# Patient Record
Sex: MALE | Race: WHITE | Employment: OTHER | URBAN - METROPOLITAN AREA
[De-identification: names, ages, dates, MRNs, and addresses within clinical notes are randomized per-mention and may not be internally consistent; named-entity substitution may affect disease eponyms.]

---

## 2017-01-12 DIAGNOSIS — G40.209 COMPLEX PARTIAL SEIZURES WITH CONSCIOUSNESS IMPAIRED (HCC): ICD-10-CM

## 2017-01-13 RX ORDER — LEVETIRACETAM 750 MG/1
TABLET ORAL
Qty: 360 TAB | Refills: 3 | Status: SHIPPED | OUTPATIENT
Start: 2017-01-13 | End: 2017-04-10 | Stop reason: SDUPTHER

## 2017-04-10 ENCOUNTER — OFFICE VISIT (OUTPATIENT)
Dept: NEUROLOGY | Age: 82
End: 2017-04-10

## 2017-04-10 VITALS
BODY MASS INDEX: 20.31 KG/M2 | DIASTOLIC BLOOD PRESSURE: 66 MMHG | WEIGHT: 134 LBS | OXYGEN SATURATION: 99 % | RESPIRATION RATE: 20 BRPM | HEIGHT: 68 IN | HEART RATE: 60 BPM | SYSTOLIC BLOOD PRESSURE: 140 MMHG

## 2017-04-10 DIAGNOSIS — G30.1 ALZHEIMER'S TYPE DEMENTIA WITH LATE ONSET WITHOUT BEHAVIORAL DISTURBANCE (HCC): ICD-10-CM

## 2017-04-10 DIAGNOSIS — G40.209 COMPLEX PARTIAL SEIZURES WITH CONSCIOUSNESS IMPAIRED (HCC): Primary | ICD-10-CM

## 2017-04-10 DIAGNOSIS — F02.80 ALZHEIMER'S TYPE DEMENTIA WITH LATE ONSET WITHOUT BEHAVIORAL DISTURBANCE (HCC): ICD-10-CM

## 2017-04-10 RX ORDER — MEMANTINE HYDROCHLORIDE 7 MG/1
CAPSULE, EXTENDED RELEASE ORAL
Qty: 21 CAP | Refills: 0 | Status: SHIPPED | OUTPATIENT
Start: 2017-04-10 | End: 2017-06-12 | Stop reason: DRUGHIGH

## 2017-04-10 RX ORDER — LEVETIRACETAM 750 MG/1
TABLET ORAL
Qty: 360 TAB | Refills: 3 | Status: SHIPPED | OUTPATIENT
Start: 2017-04-10 | End: 2017-12-22 | Stop reason: SDUPTHER

## 2017-04-10 RX ORDER — MEMANTINE HYDROCHLORIDE 21 MG/1
21 CAPSULE, EXTENDED RELEASE ORAL DAILY
Qty: 30 CAP | Refills: 5 | Status: SHIPPED | OUTPATIENT
Start: 2017-04-10 | End: 2017-12-22 | Stop reason: SDUPTHER

## 2017-04-10 NOTE — MR AVS SNAPSHOT
Visit Information Date & Time Provider Department Dept. Phone Encounter #  
 4/10/2017 11:00 AM David Escudero NP Neurology [facility] [phone] Follow-up Instructions Return in about 6 months (around 10/10/2017). Upcoming Health Maintenance Date Due DTaP/Tdap/Td series (1 - Tdap) 2/27/1948 ZOSTER VACCINE AGE 60> 2/27/1987 GLAUCOMA SCREENING Q2Y 2/27/1992 MEDICARE YEARLY EXAM 2/27/1992 INFLUENZA AGE 9 TO ADULT 8/1/2016 Pneumococcal 65+ Low/Medium Risk (2 of 2 - PPSV23) 10/2/2016 Allergies as of 4/10/2017  Review Complete On: 4/10/2017 By: David Escudero NP No Known Allergies Current Immunizations  Never Reviewed Name Date Influenza Vaccine Split 10/2/2011 12:29 PM  
 Pneumococcal Vaccine (Unspecified Type) 10/2/2011 12:27 PM  
  
 Not reviewed this visit You Were Diagnosed With   
  
 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  
 140/66 60 20 5' 8\" (1.727 m) 134 lb (60.8 kg) 99% BMI Smoking Status 20.37 kg/m2 Former Smoker Vitals History BMI and BSA Data Body Mass Index Body Surface Area  
 20.37 kg/m 2 1.71 m 2 Preferred Pharmacy Pharmacy Name Phone [id number] [address] [phone] Your Updated Medication List  
  
   
This list is accurate as of: 4/10/17 11:23 AM.  Always use your most recent med list.  
  
  
  
  
 aspirin 81 mg chewable tablet Take 81 mg by mouth nightly. Calcium-Cholecalciferol (D3) 600 mg(1,500mg) -400 unit Chew Take  by mouth. carvedilol 3.125 mg tablet Commonly known as:  Lexi Desai  
 Take 1 Tab by mouth two (2) times daily (with meals). CRESTOR 5 mg tablet Generic drug:  rosuvastatin Take 5 mg by mouth daily. levETIRAcetam 750 mg tablet Commonly known as:  KEPPRA  
take 2 tablets by mouth twice a day * memantine ER 7 mg capsule Commonly known as:  NAMENDA XR Take one po daily for one week then increase to 2 po daily for one week, then increase to 21mg thereafter * memantine ER 21 mg capsule Commonly known as:  NAMENDA XR Take 1 Cap by mouth daily. Take one po daily for one week then increase to 2 po daily for one week, then increase to 21mg thereafter NORVASC 5 mg tablet Generic drug:  amLODIPine Take 5 mg by mouth daily. VITAMIN B-12 2,500 mcg sublingual tablet Generic drug:  cyanocobalamin Take 2,500 mcg by mouth daily. VITAMIN C PO Take  by mouth. * Notice: This list has 2 medication(s) that are the same as other medications prescribed for you. Read the directions carefully, and ask your doctor or other care provider to review them with you. Prescriptions Sent to Pharmacy Refills  
 memantine ER (NAMENDA XR) 7 mg capsule 0 Sig: Take one po daily for one week then increase to 2 po daily for one week, then increase to 21mg thereafter Class: Normal  
 [facility]acy: [id number] [address] [place] [address] Ph #: [phone]  
 memantine ER (NAMENDA XR) 21 mg capsule 5 Sig: Take 1 Cap by mouth daily. Take one po daily for one week then increase to 2 po daily for one week, then increase to 21mg thereafter Class: Normal  
 Pharmacy: [address] [place] [address] Ph #: [phone] Route: Oral  
 levETIRAcetam (KEPPRA) 750 mg tablet 3 Sig: take 2 tablets by mouth twice a day Class: Normal  
 Pharmacy: [address] [place] [address] Ph #: [phone] Follow-up Instructions Return in about 6 months (around 10/10/2017). Patient Instructions A Healthy Lifestyle: Care Instructions Your Care Instructions A healthy lifestyle can help you feel good, stay at a healthy weight, and have plenty of energy for both work and play. A healthy lifestyle is something you can share with your whole family. A healthy lifestyle also can lower your risk for serious health problems, such as high blood pressure, heart disease, and diabetes. You can follow a few steps listed below to improve your health and the health of your family. Follow-up care is a key part of your treatment and safety. Be sure to make and go to all appointments, and call your doctor if you are having problems. Its also a good idea to know your test results and keep a list of the medicines you take. How can you care for yourself at home? · Do not eat too much sugar, fat, or fast foods. You can still have dessert and treats now and then. The goal is moderation. · Start small to improve your eating habits. Pay attention to portion sizes, drink less juice and soda pop, and eat more fruits and vegetables. ¨ Eat a healthy amount of food. A 3-ounce serving of meat, for example, is about the size of a deck of cards. Fill the rest of your plate with vegetables and whole grains. ¨ Limit the amount of soda and sports drinks you have every day. Drink more water when you are thirsty. ¨ Eat at least 5 servings of fruits and vegetables every day. It may seem like a lot, but it is not hard to reach this goal. A serving or helping is 1 piece of fruit, 1 cup of vegetables, or 2 cups of leafy, raw vegetables. Have an apple or some carrot sticks as an afternoon snack instead of a candy bar. Try to have fruits and/or vegetables at every meal. 
· Make exercise part of your daily routine. You may want to start with simple activities, such as walking, bicycling, or slow swimming.  Try to be active 30 to 60 minutes every day. You do not need to do all 30 to 60 minutes all at once. For example, you can exercise 3 times a day for 10 or 20 minutes. Moderate exercise is safe for most people, but it is always a good idea to talk to your doctor before starting an exercise program. 
· Keep moving. Mayo Bran the lawn, work in the garden, or [employer]. Take the stairs instead of the elevator at work. · If you smoke, quit. People who smoke have an increased risk for heart attack, stroke, cancer, and other lung illnesses. Quitting is hard, but there are ways to boost your chance of quitting tobacco for good. ¨ Use nicotine gum, patches, or lozenges. ¨ Ask your doctor about stop-smoking programs and medicines. ¨ Keep trying. In addition to reducing your risk of diseases in the future, you will notice some benefits soon after you stop using tobacco. If you have shortness of breath or asthma symptoms, they will likely get better within a few weeks after you quit. · Limit how much alcohol you drink. Moderate amounts of alcohol (up to 2 drinks a day for men, 1 drink a day for women) are okay. But drinking too much can lead to liver problems, high blood pressure, and other health problems. Family health If you have a family, there are many things you can do together to improve your health. · Eat meals together as a family as often as possible. · Eat healthy foods. This includes fruits, vegetables, lean meats and dairy, and whole grains. · Include your family in your fitness plan. Most people think of activities such as jogging or tennis as the way to fitness, but there are many ways you and your family can be more active. Anything that makes you breathe hard and gets your heart pumping is exercise. Here are some tips: 
¨ Walk to do errands or to take your child to school or the bus. ¨ Go for a family bike ride after dinner instead of watching TV. Where can you learn more? Go to http://svetlana-brooke.info/. Enter W391 in the search box to learn more about \"A Healthy Lifestyle: Care Instructions. \" Current as of: July 26, 2016 Content Version: 11.2 © 4032-8042 [employer], [employer]. Care instructions adapted under license by [employer] (which disclaims liability or warranty for this information). If you have questions about a medical condition or this instruction, always ask your healthcare professional. [address] any warranty or liability for your use of this information. Introducing [facility]! [facility] introduces [employer] patient portal. Now you can access parts of your medical record, email your doctor's office, and request medication refills online. 1. In your internet browser, go to [email or web address] 2. Click on the First Time User? Click Here link in the Sign In box. You will see the New Member Sign Up page. 3. Enter your [employer] Access Code exactly as it appears below. You will not need to use this code after youve completed the sign-up process. If you do not sign up before the expiration date, you must request a new code. · [employer] Access Code: [email or web address] Expires: 7/9/2017 11:23 AM 
 
4. Enter the last four digits of your Social Security Number (xxxx) and Date of Birth (mm/dd/yyyy) as indicated and click Submit. You will be taken to the next sign-up page. 5. Create a [employer] ID. This will be your [employer] login ID and cannot be changed, so think of one that is secure and easy to remember. 6. Create a [employer] password. You can change your password at any time. 7. Enter your Password Reset Question and Answer. This can be used at a later time if you forget your password. 8. Enter your e-mail address. You will receive e-mail notification when new information is available in [address] 9. Click Sign Up. You can now view and download portions of your medical record. 10. Click the Download Summary menu link to download a portable copy of your medical information. If you have questions, please visit the Frequently Asked Questions section of the [employer] website. Remember, [employer] is NOT to be used for urgent needs. For medical emergencies, dial 911. Now available from your iPhone and Android! Please provide this summary of care documentation to your next provider. Your primary care clinician is listed as [address] [place] [address]. If you have any questions after today's visit, please call [phone].

## 2017-04-10 NOTE — PATIENT INSTRUCTIONS

## 2017-04-10 NOTE — PROGRESS NOTES
Date:  04/10/17     Name:  Dean Llanes.  :  1927  MRN:  [id number]     PCP:  Brandy Reyes NP    Chief Complaint   Patient presents with    Seizure     and memory loss      HISTORY OF PRESENT ILLNESS: Follow up epilepsy and late onset dementia. Pt had a mild seizure in January, a staring spell. At this time they decided to stop his Aricept as they believed this was the cause of his seizure. Pt and family have not noticed any difference since stopping the Aricept, state that everything is about the same. He states that he is doing very well and that nothing has changed with his health. He states he tries to stay active, does word searches, plays on the computer and goes outside. Repeating or asking the same thing over and over? No.  Forgetting appointments, family occasions, holidays? No, daughter writes them on the calendar. Needs help managing finances? No.  Needs help shopping? No.  Needs help taking medications correctly? No.  Getting lost in familiar places? No.  Need help with ADLs? No.    Unsafe behaviors:   Aggression- No  Wandering- No  Kitchen- No  Driving (obeying signs, etc)- does not drive    Answers provided by: pt and daughter    Last MMSE:      Current Outpatient Prescriptions   Medication Sig    levETIRAcetam (KEPPRA) 750 mg tablet take 2 tablets by mouth twice a day    cyanocobalamin (VITAMIN B-12) 2,500 mcg sublingual tablet Take 2,500 mcg by mouth daily.  ASCORBATE CALCIUM (VITAMIN C PO) Take  by mouth.  Calcium-Cholecalciferol, D3, 600 mg(1,500mg) -400 unit chew Take  by mouth.  carvedilol (COREG) 3.125 mg tablet Take 1 Tab by mouth two (2) times daily (with meals).  aspirin 81 mg chewable tablet Take 81 mg by mouth nightly.  rosuvastatin (CRESTOR) 5 mg tablet Take 5 mg by mouth daily.  amlodipine (NORVASC) 5 mg tablet Take 5 mg by mouth daily. No current facility-administered medications for this visit. No Known Allergies  Past Medical History:   Diagnosis Date    CAD (coronary artery disease)     Hypercholesteremia     Hypertension     Seizures ([address]     last seizure in 2011 per pt     Past Surgical History:   Procedure Laterality Date    CARDIAC SURG PROCEDURE UNLIST      cardiac stents    HX APPENDECTOMY  1945    HX CHOLECYSTECTOMY  2007    VA EGD BALLOON DILATION ESOPHAGUS <30 MM DIAM  11/21/2012          Social History     Social History    Marital status:      Spouse name: N/A    Number of children: N/A    Years of education: N/A     Occupational History    Not on file. Social History Main Topics    Smoking status: Former Smoker    Smokeless tobacco: Never Used    Alcohol use No    Drug use: No    Sexual activity: Not on file     Other Topics Concern    Not on file     Social History Narrative     Family History   Problem Relation Age of Onset    Alcohol abuse Mother        PHYSICAL EXAMINATION:    Visit Vitals    /66    Pulse 60    Resp 20    Ht 5' 8\" (1.727 m)    Wt 60.8 kg (134 lb)    SpO2 99%    BMI 20.37 kg/m2     General: Well defined, nourished, and groomed individual in no acute distress. Neck: Supple, nontender, no bruits, no pain with resistance to active range of motion. Heart: Regular rate and rhythm, no murmurs, rub, or gallop. Normal S1S2. Lungs: Clear to auscultation bilaterally with equal chest expansion, no cough, no wheeze   Musculoskeletal: Extremities revealed no edema and had full range of motion of joints. Psych: Good mood and bright affect   NEUROLOGICAL EXAMINATION:   Mental Status: Alert and oriented to person, place, and time   Cranial Nerves:   II, III, IV, VI: Visual acuity grossly intact. Visual fields are normal.   Pupils are equal, round, and reactive to light and accommodation. Extra-ocular movements are full and fluid. Fundoscopic exam was benign, no ptosis or nystagmus. V-XII: Hearing is grossly intact.  Facial features are symmetric, with normal sensation and strength. The palate rises symmetrically and the tongue protrudes midline. Sternocleidomastoids 5/5. Motor Examination: Normal tone, bulk, and strength, 5/5 muscle strength throughout. Coordination: No resting or intention tremor   Gait and Station: Steady while walking. No muscle wasting or fasiculations noted. Reflexes: DTRs 2+ throughout.      ASSESSMENT AND PLAN    ICD-10-CM ICD-9-CM    1. Complex partial seizures with consciousness impaired (HCC) G40.209 345.40 levETIRAcetam (KEPPRA) 750 mg tablet   2. 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 Discussed medication alternatives to Aricept including Namenda. Discussed that the goal of memory management therapy is to maintain cognition and delay progression. We will prescribe Namenda titration today. Pt and daughter educated on potential side effects. Pt will continue his Keppra twice daily. He will follow up in six months or sooner if needed. If they have any issues with the Namenda, they were encouraged to call. Pt and daughter informed that they can call with issues or concerns at any time.

## 2017-04-12 ENCOUNTER — TELEPHONE (OUTPATIENT)
Dept: NEUROLOGY | Age: 82
End: 2017-04-12

## 2017-04-12 NOTE — TELEPHONE ENCOUNTER
----- Message from Sraahi La sent at 4/12/2017 10:43 AM EDT -----  Regarding: NP., Renato/Telephone  Pt called concerning the medication which he was given was expensive and they wanted to see if he could take the medication Donetezil.  Pt number [phone]

## 2017-04-13 NOTE — TELEPHONE ENCOUNTER
He can but this is the one they thought might have caused his breakthrough seizure which is why he isn't taking this now. He would start off at 5mg a day, so if they have the 10mg dose they will need to cut that in half for one month then go to the whole pill per NP.     returned call to pt's daughter and she was not aware that it was the same medication. She was asking if you had any other suggestions at this time?

## 2017-04-13 NOTE — TELEPHONE ENCOUNTER
returned call to pt's daughter and she was not aware that it was the same medication. She was asking if you had any other suggestions at this time? Please advise     We can try Razadyne ER 8mg daily. Let her know that this is different but in the same class of medication. Per NP. Left Message - on home number listed letting her know of the previous message sent from NP. She can call us back and let us know if she wants him to start this medication or not.

## 2017-06-07 ENCOUNTER — TELEPHONE (OUTPATIENT)
Dept: NEUROLOGY | Age: 82
End: 2017-06-07

## 2017-06-07 NOTE — TELEPHONE ENCOUNTER
Pt's daughter would like to have call back from you please regarding to pt's new med she does not know the name of med. She said \"the one that NP wants to try on him\".  Thank you

## 2017-06-12 PROBLEM — G30.1 ALZHEIMER'S TYPE DEMENTIA WITH LATE ONSET WITHOUT BEHAVIORAL DISTURBANCE (HCC): Status: ACTIVE | Noted: 2017-06-12

## 2017-06-12 PROBLEM — F02.80 ALZHEIMER'S TYPE DEMENTIA WITH LATE ONSET WITHOUT BEHAVIORAL DISTURBANCE (HCC): Status: ACTIVE | Noted: 2017-06-12

## 2017-09-14 DIAGNOSIS — F02.80 ALZHEIMER'S TYPE DEMENTIA WITH LATE ONSET WITHOUT BEHAVIORAL DISTURBANCE (HCC): ICD-10-CM

## 2017-09-14 DIAGNOSIS — G30.1 ALZHEIMER'S TYPE DEMENTIA WITH LATE ONSET WITHOUT BEHAVIORAL DISTURBANCE (HCC): ICD-10-CM

## 2017-09-16 DIAGNOSIS — G30.1 ALZHEIMER'S TYPE DEMENTIA WITH LATE ONSET WITHOUT BEHAVIORAL DISTURBANCE (HCC): ICD-10-CM

## 2017-09-16 DIAGNOSIS — F02.80 ALZHEIMER'S TYPE DEMENTIA WITH LATE ONSET WITHOUT BEHAVIORAL DISTURBANCE (HCC): ICD-10-CM

## 2017-09-18 RX ORDER — GALANTAMINE HYDROBROMIDE 8 MG/1
CAPSULE, EXTENDED RELEASE ORAL
Qty: 30 CAP | Refills: 2 | Status: SHIPPED | OUTPATIENT
Start: 2017-09-18 | End: 2017-12-14 | Stop reason: SDUPTHER

## 2017-09-18 RX ORDER — GALANTAMINE HYDROBROMIDE 8 MG/1
CAPSULE, EXTENDED RELEASE ORAL
Qty: 30 CAP | Refills: 2 | Status: SHIPPED | OUTPATIENT
Start: 2017-09-18 | End: 2017-12-22 | Stop reason: SDUPTHER

## 2017-12-14 DIAGNOSIS — G30.1 ALZHEIMER'S TYPE DEMENTIA WITH LATE ONSET WITHOUT BEHAVIORAL DISTURBANCE (HCC): ICD-10-CM

## 2017-12-14 DIAGNOSIS — F02.80 ALZHEIMER'S TYPE DEMENTIA WITH LATE ONSET WITHOUT BEHAVIORAL DISTURBANCE (HCC): ICD-10-CM

## 2017-12-14 RX ORDER — GALANTAMINE HYDROBROMIDE 8 MG/1
CAPSULE, EXTENDED RELEASE ORAL
Qty: 30 CAP | Refills: 2 | Status: SHIPPED | OUTPATIENT
Start: 2017-12-14 | End: 2017-12-22

## 2017-12-22 ENCOUNTER — OFFICE VISIT (OUTPATIENT)
Dept: NEUROLOGY | Age: 82
End: 2017-12-22

## 2017-12-22 VITALS
HEIGHT: 68 IN | BODY MASS INDEX: 19.1 KG/M2 | SYSTOLIC BLOOD PRESSURE: 130 MMHG | WEIGHT: 126 LBS | RESPIRATION RATE: 20 BRPM | DIASTOLIC BLOOD PRESSURE: 64 MMHG

## 2017-12-22 DIAGNOSIS — G30.1 ALZHEIMER'S TYPE DEMENTIA WITH LATE ONSET WITHOUT BEHAVIORAL DISTURBANCE (HCC): ICD-10-CM

## 2017-12-22 DIAGNOSIS — F02.80 ALZHEIMER'S TYPE DEMENTIA WITH LATE ONSET WITHOUT BEHAVIORAL DISTURBANCE (HCC): ICD-10-CM

## 2017-12-22 DIAGNOSIS — G40.209 COMPLEX PARTIAL SEIZURES WITH CONSCIOUSNESS IMPAIRED (HCC): ICD-10-CM

## 2017-12-22 RX ORDER — MEMANTINE HYDROCHLORIDE 21 MG/1
21 CAPSULE, EXTENDED RELEASE ORAL DAILY
Qty: 30 CAP | Refills: 5 | Status: SHIPPED | OUTPATIENT
Start: 2017-12-22 | End: 2018-06-18 | Stop reason: SDUPTHER

## 2017-12-22 RX ORDER — LEVETIRACETAM 750 MG/1
TABLET ORAL
Qty: 360 TAB | Refills: 5 | Status: SHIPPED | OUTPATIENT
Start: 2017-12-22 | End: 2018-06-18 | Stop reason: SDUPTHER

## 2017-12-22 RX ORDER — GALANTAMINE HYDROBROMIDE 8 MG/1
CAPSULE, EXTENDED RELEASE ORAL
Qty: 30 CAP | Refills: 5 | Status: SHIPPED | OUTPATIENT
Start: 2017-12-22 | End: 2018-06-18 | Stop reason: SDUPTHER

## 2017-12-22 RX ORDER — OXYCODONE HYDROCHLORIDE 5 MG/1
TABLET ORAL
Refills: 0 | COMMUNITY
Start: 2017-12-11 | End: 2018-09-29 | Stop reason: CLARIF

## 2017-12-22 NOTE — PATIENT INSTRUCTIONS
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
## 2017-12-22 NOTE — MR AVS SNAPSHOT
Visit Information Date & Time Provider Department Dept. Phone Encounter #  
 12/22/2017 11:30 AM JUAN LUIS Perezhe [facility]legodel[facility] [phone] [id number] Follow-up Instructions Return in about 6 months (around 6/22/2018). Upcoming Health Maintenance Date Due DTaP/Tdap/Td series (1 - Tdap) 2/27/1948 ZOSTER VACCINE AGE 60> 12/27/1986 GLAUCOMA SCREENING Q2Y 2/27/1992 MEDICARE YEARLY EXAM 2/27/1992 Pneumococcal 65+ Low/Medium Risk (2 of 2 - PPSV23) 10/2/2016 Influenza Age 5 to Adult 8/1/2017 Allergies as of 12/22/2017  Review Complete On: 12/22/2017 By: Santos Alvarado NP No Known Allergies Current Immunizations  Never Reviewed Name Date Influenza Vaccine Split 10/2/2011 12:29 PM  
 ZZZ-RETIRED (DO NOT USE) Pneumococcal Vaccine (Unspecified Type) 10/2/2011 12:27 PM  
  
 Not reviewed this visit You Were Diagnosed With   
  
 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  
 19.16 kg/m 2 1.66 m 2 Preferred Pharmacy Pharmacy Name Phone [id number] [address] [place] [address] [phone] Your Updated Medication List  
  
   
This list is accurate as of: 12/22/17 12:25 PM.  Always use your most recent med list.  
  
  
  
  
 aspirin 81 mg chewable tablet Take 81 mg by mouth nightly. Calcium-Cholecalciferol (D3) 600 mg(1,500mg) -400 unit Chew Take  by mouth. carvedilol 3.125 mg tablet Commonly known as:  Svetlana Carnes  
 Take 1 Tab by mouth two (2) times daily (with meals). CRESTOR 5 mg tablet Generic drug:  rosuvastatin Take 5 mg by mouth daily. galantamine 8 mg SR capsule Commonly known as:  RAZADYNE  
take 1 capsule by mouth daily before BREAKFAST  
  
 levETIRAcetam 750 mg tablet Commonly known as:  KEPPRA  
take 2 tablets by mouth twice a day  
  
 memantine ER 21 mg capsule Commonly known as:  NAMENDA XR Take 1 Cap by mouth daily. NORVASC 5 mg tablet Generic drug:  amLODIPine Take 5 mg by mouth daily. oxyCODONE IR 5 mg immediate release tablet Commonly known as:  ROXICODONE  
TAKE ONE-HALF TO 1 TABLET EVERY 6 HOURS FOR 5 DAYS AS NEEDED FOR PAIN. VITAMIN B-12 2,500 mcg sublingual tablet Generic drug:  cyanocobalamin Take 2,500 mcg by mouth daily. VITAMIN C PO Take  by mouth. Prescriptions Sent to Pharmacy Refills  
 galantamine (RAZADYNE) 8 mg SR capsule 5 Sig: take 1 capsule by mouth daily before BREAKFAST Class: Normal  
 Pharmacy: [address] Ph #: [phone]  
 memantine ER (NAMENDA XR) 21 mg capsule 5 Sig: Take 1 Cap by mouth daily. Class: Normal  
 Pharmacy: [address] [place] [address] Ph #: [phone] Route: Oral  
 levETIRAcetam (KEPPRA) 750 mg tablet 5 Sig: take 2 tablets by mouth twice a day Class: Normal  
 Pharmacy: [address] [place] [address] Ph #: [phone] Follow-up Instructions Return in about 6 months (around 6/22/2018). Patient Instructions A Healthy Lifestyle: Care Instructions Your Care Instructions A healthy lifestyle can help you feel good, stay at a healthy weight, and have plenty of energy for both work and play. A healthy lifestyle is something you can share with your whole family. A healthy lifestyle also can lower your risk for serious health problems, such as high blood pressure, heart disease, and diabetes. You can follow a few steps listed below to improve your health and the health of your family. Follow-up care is a key part of your treatment and safety. Be sure to make and go to all appointments, and call your doctor if you are having problems. It's also a good idea to know your test results and keep a list of the medicines you take. How can you care for yourself at home? · Do not eat too much sugar, fat, or fast foods. You can still have dessert and treats now and then. The goal is moderation. · Start small to improve your eating habits. Pay attention to portion sizes, drink less juice and soda pop, and eat more fruits and vegetables. ¨ Eat a healthy amount of food. A 3-ounce serving of meat, for example, is about the size of a deck of cards. Fill the rest of your plate with vegetables and whole grains. ¨ Limit the amount of soda and sports drinks you have every day. Drink more water when you are thirsty. ¨ Eat at least 5 servings of fruits and vegetables every day. It may seem like a lot, but it is not hard to reach this goal. A serving or helping is 1 piece of fruit, 1 cup of vegetables, or 2 cups of leafy, raw vegetables. Have an apple or some carrot sticks as an afternoon snack instead of a candy bar. Try to have fruits and/or vegetables at every meal. 
· Make exercise part of your daily routine. You may want to start with simple activities, such as walking, bicycling, or slow swimming. Try to be active 30 to 60 minutes every day. You do not need to do all 30 to 60 minutes all at once. For example, you can exercise 3 times a day for 10 or 20 minutes. Moderate exercise is safe for most people, but it is always a good idea to talk to your doctor before starting an exercise program. 
· Keep moving. Roanne Pages the lawn, work in the garden, or [employer].  Take the stairs instead of the elevator at work. · If you smoke, quit. People who smoke have an increased risk for heart attack, stroke, cancer, and other lung illnesses. Quitting is hard, but there are ways to boost your chance of quitting tobacco for good. ¨ Use nicotine gum, patches, or lozenges. ¨ Ask your doctor about stop-smoking programs and medicines. ¨ Keep trying. In addition to reducing your risk of diseases in the future, you will notice some benefits soon after you stop using tobacco. If you have shortness of breath or asthma symptoms, they will likely get better within a few weeks after you quit. · Limit how much alcohol you drink. Moderate amounts of alcohol (up to 2 drinks a day for men, 1 drink a day for women) are okay. But drinking too much can lead to liver problems, high blood pressure, and other health problems. Family health If you have a family, there are many things you can do together to improve your health. · Eat meals together as a family as often as possible. · Eat healthy foods. This includes fruits, vegetables, lean meats and dairy, and whole grains. · Include your family in your fitness plan. Most people think of activities such as jogging or tennis as the way to fitness, but there are many ways you and your family can be more active. Anything that makes you breathe hard and gets your heart pumping is exercise. Here are some tips: 
¨ Walk to do errands or to take your child to school or the bus. ¨ Go for a family bike ride after dinner instead of watching TV. Where can you learn more? Go to http://svetlana-brooke.info/. Enter Y685 in the search box to learn more about \"A Healthy Lifestyle: Care Instructions. \" Current as of: May 12, 2017 Content Version: 11.4 © 5118-3425 Healthwise, [employer].  Care instructions adapted under license by [employer] (which disclaims liability or warranty for this information). If you have questions about a medical condition or this instruction, always ask your healthcare professional. [address] any warranty or liability for your use of this information. Introducing [facility]! [facility] introduces Medbox patient portal. Now you can access parts of your medical record, email your doctor's office, and request medication refills online. 1. In your internet browser, go to [email or web address] 2. Click on the First Time User? Click Here link in the Sign In box. You will see the New Member Sign Up page. 3. Enter your Medbox Access Code exactly as it appears below. You will not need to use this code after youve completed the sign-up process. If you do not sign up before the expiration date, you must request a new code. · Medbox Access Code: [email or web address] Expires: 3/22/2018 12:25 PM 
 
4. Enter the last four digits of your Social Security Number (xxxx) and Date of Birth (mm/dd/yyyy) as indicated and click Submit. You will be taken to the next sign-up page. 5. Create a Medbox ID. This will be your Medbox login ID and cannot be changed, so think of one that is secure and easy to remember. 6. Create a Medbox password. You can change your password at any time. 7. Enter your Password Reset Question and Answer. This can be used at a later time if you forget your password. 8. Enter your e-mail address. You will receive e-mail notification when new information is available in [address] 9. Click Sign Up. You can now view and download portions of your medical record. 10. Click the Download Summary menu link to download a portable copy of your medical information. If you have questions, please visit the Frequently Asked Questions section of the Medbox website. Remember, Medbox is NOT to be used for urgent needs. For medical emergencies, dial 911. Now available from your iPhone and Android! Please provide this summary of care documentation to your next provider. Your primary care clinician is listed as Girish Rodriguez. If you have any questions after today's visit, please call [phone].
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
## 2018-01-01 ENCOUNTER — OFFICE VISIT (OUTPATIENT)
Dept: NEUROLOGY | Age: 83
End: 2018-01-01

## 2018-01-01 VITALS
WEIGHT: 121 LBS | RESPIRATION RATE: 16 BRPM | SYSTOLIC BLOOD PRESSURE: 130 MMHG | OXYGEN SATURATION: 97 % | DIASTOLIC BLOOD PRESSURE: 60 MMHG | HEART RATE: 62 BPM | HEIGHT: 68 IN | BODY MASS INDEX: 18.34 KG/M2

## 2018-01-01 DIAGNOSIS — G30.1 ALZHEIMER'S TYPE DEMENTIA WITH LATE ONSET WITHOUT BEHAVIORAL DISTURBANCE (HCC): ICD-10-CM

## 2018-01-01 DIAGNOSIS — F02.80 ALZHEIMER'S TYPE DEMENTIA WITH LATE ONSET WITHOUT BEHAVIORAL DISTURBANCE (HCC): ICD-10-CM

## 2018-01-01 DIAGNOSIS — G40.209 COMPLEX PARTIAL SEIZURES WITH CONSCIOUSNESS IMPAIRED (HCC): ICD-10-CM

## 2018-01-01 RX ORDER — LEVETIRACETAM 750 MG/1
TABLET ORAL
Qty: 360 TAB | Refills: 5 | Status: SHIPPED | OUTPATIENT
Start: 2018-01-01 | End: 2019-01-01 | Stop reason: SDUPTHER

## 2018-01-01 RX ORDER — GALANTAMINE HYDROBROMIDE 8 MG/1
CAPSULE, EXTENDED RELEASE ORAL
Qty: 30 CAP | Refills: 5 | Status: SHIPPED | OUTPATIENT
Start: 2018-01-01 | End: 2018-01-01 | Stop reason: SDUPTHER

## 2018-01-01 RX ORDER — GALANTAMINE HYDROBROMIDE 8 MG/1
CAPSULE, EXTENDED RELEASE ORAL
Qty: 90 CAP | Refills: 1 | Status: SHIPPED | OUTPATIENT
Start: 2018-01-01 | End: 2019-01-01 | Stop reason: SDUPTHER

## 2018-06-18 ENCOUNTER — OFFICE VISIT (OUTPATIENT)
Dept: NEUROLOGY | Age: 83
End: 2018-06-18

## 2018-06-18 VITALS
HEART RATE: 56 BPM | RESPIRATION RATE: 20 BRPM | HEIGHT: 68 IN | DIASTOLIC BLOOD PRESSURE: 62 MMHG | OXYGEN SATURATION: 96 % | WEIGHT: 128 LBS | SYSTOLIC BLOOD PRESSURE: 132 MMHG | BODY MASS INDEX: 19.4 KG/M2

## 2018-06-18 DIAGNOSIS — F02.80 ALZHEIMER'S TYPE DEMENTIA WITH LATE ONSET WITHOUT BEHAVIORAL DISTURBANCE (HCC): ICD-10-CM

## 2018-06-18 DIAGNOSIS — G40.209 COMPLEX PARTIAL SEIZURES WITH CONSCIOUSNESS IMPAIRED (HCC): ICD-10-CM

## 2018-06-18 DIAGNOSIS — G30.1 ALZHEIMER'S TYPE DEMENTIA WITH LATE ONSET WITHOUT BEHAVIORAL DISTURBANCE (HCC): ICD-10-CM

## 2018-06-18 RX ORDER — GALANTAMINE HYDROBROMIDE 8 MG/1
CAPSULE, EXTENDED RELEASE ORAL
Qty: 30 CAP | Refills: 5 | Status: SHIPPED | OUTPATIENT
Start: 2018-06-18 | End: 2018-08-15 | Stop reason: SDUPTHER

## 2018-06-18 RX ORDER — MEMANTINE HYDROCHLORIDE 21 MG/1
21 CAPSULE, EXTENDED RELEASE ORAL DAILY
Qty: 30 CAP | Refills: 5 | Status: SHIPPED | OUTPATIENT
Start: 2018-06-18 | End: 2018-06-25 | Stop reason: SINTOL

## 2018-06-18 RX ORDER — LEVETIRACETAM 750 MG/1
TABLET ORAL
Qty: 360 TAB | Refills: 5 | Status: SHIPPED | OUTPATIENT
Start: 2018-06-18 | End: 2018-01-01 | Stop reason: SDUPTHER

## 2018-06-18 NOTE — PATIENT INSTRUCTIONS

## 2018-06-18 NOTE — PROGRESS NOTES
No seizures since he was last here   No changes with his memory since he was here last per his daughter

## 2018-06-18 NOTE — MR AVS SNAPSHOT
[facility]  [facility] [address] [facility] [place] [id number] [phone] Patient: Ronny Shirley Sr. MRN: [id number] Visit Information Date & Time Provider Department Dept. Phone Encounter #  
 2018 11:30 AM Froylan Fox NP [facility] [phone] [id number] Upcoming Health Maintenance Date Due DTaP/Tdap/Td series (1 - Tdap) 1948 ZOSTER VACCINE AGE 60> 1986 GLAUCOMA SCREENING Q2Y 1992 Pneumococcal 65+ Low/Medium Risk (2 of 2 - PPSV23) 10/2/2016 Influenza Age 5 to Adult 2018 Allergies as of 2018  Review Complete On: 2018 By: Froylan Fox NP No Known Allergies Current Immunizations  Never Reviewed Name Date Influenza Vaccine Split 10/2/2011 12:29 PM  
 ZZZ-RETIRED (DO NOT USE) Pneumococcal Vaccine (Unspecified Type) 10/2/2011 12:27 PM  
  
 Not reviewed this visit You Were Diagnosed With   
  
 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  
 132/62 (!) 56 20 5' 8\" (1.727 m) 128 lb (58.1 kg) 96% BMI Smoking Status 19.46 kg/m2 Former Smoker Vitals History BMI and BSA Data Body Mass Index Body Surface Area  
 19.46 kg/m 2 1.67 m 2 Preferred Pharmacy Pharmacy Name Phone [id number] [address] [place] [address], [address] [place] [address] [phone] Your Updated Medication List  
  
   
This list is accurate as of 18 12:32 PM.  Always use your most recent med list.  
  
  
  
  
 aspirin 81 mg chewable tablet Take 81 mg by mouth nightly. Calcium-Cholecalciferol (D3) 600 mg(1,500mg) -400 unit Chew Take  by mouth. carvedilol 3.125 mg tablet Commonly known as:  Lyssa Finder Take 1 Tab by mouth two (2) times daily (with meals). CRESTOR 5 mg tablet Generic drug:  rosuvastatin Take 5 mg by mouth daily. galantamine 8 mg SR capsule Commonly known as:  RAZADYNE  
take 1 capsule by mouth daily before BREAKFAST  
  
 levETIRAcetam 750 mg tablet Commonly known as:  KEPPRA  
take 2 tablets by mouth twice a day  
  
 memantine ER 21 mg capsule Commonly known as:  NAMENDA XR Take 1 Cap by mouth daily. NORVASC 5 mg tablet Generic drug:  amLODIPine Take 5 mg by mouth daily. oxyCODONE IR 5 mg immediate release tablet Commonly known as:  ROXICODONE  
TAKE ONE-HALF TO 1 TABLET EVERY 6 HOURS FOR 5 DAYS AS NEEDED FOR PAIN. VITAMIN B-12 2,500 mcg sublingual tablet Generic drug:  cyanocobalamin Take 2,500 mcg by mouth daily. VITAMIN C PO Take  by mouth. Prescriptions Sent to Pharmacy Refills  
 galantamine (RAZADYNE) 8 mg SR capsule 5 Sig: take 1 capsule by mouth daily before BREAKFAST Class: Normal  
 Pharmacy: [address] Ph #: [phone]  
 memantine ER (NAMENDA XR) 21 mg capsule 5 Sig: Take 1 Cap by mouth daily. Class: Normal  
 Pharmacy: [address] [place] [address] Ph #: [phone] Route: Oral  
 levETIRAcetam (KEPPRA) 750 mg tablet 5 Sig: take 2 tablets by mouth twice a day Class: Normal  
 Pharmacy: [address] [place] [address] Ph #: [phone] Patient Instructions A Healthy Lifestyle: Care Instructions Your Care Instructions A healthy lifestyle can help you feel good, stay at a healthy weight, and have plenty of energy for both work and play.  A healthy lifestyle is something you can share with your whole family. A healthy lifestyle also can lower your risk for serious health problems, such as high blood pressure, heart disease, and diabetes. You can follow a few steps listed below to improve your health and the health of your family. Follow-up care is a key part of your treatment and safety. Be sure to make and go to all appointments, and call your doctor if you are having problems. It's also a good idea to know your test results and keep a list of the medicines you take. How can you care for yourself at home? · Do not eat too much sugar, fat, or fast foods. You can still have dessert and treats now and then. The goal is moderation. · Start small to improve your eating habits. Pay attention to portion sizes, drink less juice and soda pop, and eat more fruits and vegetables. ¨ Eat a healthy amount of food. A 3-ounce serving of meat, for example, is about the size of a deck of cards. Fill the rest of your plate with vegetables and whole grains. ¨ Limit the amount of soda and sports drinks you have every day. Drink more water when you are thirsty. ¨ Eat at least 5 servings of fruits and vegetables every day. It may seem like a lot, but it is not hard to reach this goal. A serving or helping is 1 piece of fruit, 1 cup of vegetables, or 2 cups of leafy, raw vegetables. Have an apple or some carrot sticks as an afternoon snack instead of a candy bar. Try to have fruits and/or vegetables at every meal. 
· Make exercise part of your daily routine. You may want to start with simple activities, such as walking, bicycling, or slow swimming. Try to be active 30 to 60 minutes every day. You do not need to do all 30 to 60 minutes all at once. For example, you can exercise 3 times a day for 10 or 20 minutes.  Moderate exercise is safe for most people, but it is always a good idea to talk to your doctor before starting an exercise program. 
 · Keep moving. Antonino Aguirre the lawn, work in the garden, or ScanÃ¢â‚¬Â¢Jour. Take the stairs instead of the elevator at work. · If you smoke, quit. People who smoke have an increased risk for heart attack, stroke, cancer, and other lung illnesses. Quitting is hard, but there are ways to boost your chance of quitting tobacco for good. ¨ Use nicotine gum, patches, or lozenges. ¨ Ask your doctor about stop-smoking programs and medicines. ¨ Keep trying. In addition to reducing your risk of diseases in the future, you will notice some benefits soon after you stop using tobacco. If you have shortness of breath or asthma symptoms, they will likely get better within a few weeks after you quit. · Limit how much alcohol you drink. Moderate amounts of alcohol (up to 2 drinks a day for men, 1 drink a day for women) are okay. But drinking too much can lead to liver problems, high blood pressure, and other health problems. Family health If you have a family, there are many things you can do together to improve your health. · Eat meals together as a family as often as possible. · Eat healthy foods. This includes fruits, vegetables, lean meats and dairy, and whole grains. · Include your family in your fitness plan. Most people think of activities such as jogging or tennis as the way to fitness, but there are many ways you and your family can be more active. Anything that makes you breathe hard and gets your heart pumping is exercise. Here are some tips: 
¨ Walk to do errands or to take your child to school or the bus. ¨ Go for a family bike ride after dinner instead of watching TV. Where can you learn more? Go to http://svetlana-brooke.info/. Enter X340 in the search box to learn more about \"A Healthy Lifestyle: Care Instructions. \" Current as of: May 12, 2017 Content Version: 11.4 © 0802-0753 Healthwise[employer] Incorporated.  Care instructions adapted under license by [address] (which disclaims liability or warranty for this information). If you have questions about a medical condition or this instruction, always ask your healthcare professional. [address] any warranty or liability for your use of this information. Introducing [facility]! [facility] introduces [employer] patient portal. Now you can access parts of your medical record, email your doctor's office, and request medication refills online. 1. In your internet browser, go to [email or web address] 2. Click on the First Time User? Click Here link in the Sign In box. You will see the New Member Sign Up page. 3. Enter your [employer] Access Code exactly as it appears below. You will not need to use this code after youve completed the sign-up process. If you do not sign up before the expiration date, you must request a new code. · [employer] Access Code: [email or web address] Expires: 9/16/2018 12:32 PM 
 
4. Enter the last four digits of your Social Security Number (xxxx) and Date of Birth (mm/dd/yyyy) as indicated and click Submit. You will be taken to the next sign-up page. 5. Create a [employer] ID. This will be your [employer] login ID and cannot be changed, so think of one that is secure and easy to remember. 6. Create a [employer] password. You can change your password at any time. 7. Enter your Password Reset Question and Answer. This can be used at a later time if you forget your password. 8. Enter your e-mail address. You will receive e-mail notification when new information is available in [address] 9. Click Sign Up. You can now view and download portions of your medical record. 10. Click the Download Summary menu link to download a portable copy of your medical information. If you have questions, please visit the Frequently Asked Questions section of the [employer] website.  Remember, [employer] is NOT to be used for urgent needs. For medical emergencies, dial 911. Now available from your iPhone and Android! Please provide this summary of care documentation to your next provider. Your primary care clinician is listed as Chris Renee. If you have any questions after today's visit, please call [phone].

## 2018-06-18 NOTE — PROGRESS NOTES
Date:  18     Name:  Juma Colmenares.  :  1927  MRN:  [id number]     PCP:  Dariusz Merrill NP    Chief Complaint   Patient presents with    Epilepsy     HISTORY OF PRESENT ILLNESS: Follow up for evaluation of epilepsy and dementia. He continues with Razadyne ER for memory management without difficulty. His daughter indicates that he continues to be active physically and mentally. He is independent with all ADLs. He does not drive. No dangerous behaviors, confusion, or wandering. He is not getting lost in familiar places. He continues with Keppra for seizure management without sign or symptom of toxicity or side effect. No seizures. No new issues or problems. Except as noted above, denies  fever, chills, cough. No CP or SOB. No dysuria, loss of bowel or bladder control. No Weight loss. Appetite good. Sleeping well. No sweats. No edema. No bruising or bleeding. No nausea or vomit. No diarrhea. No frequency, urgency, No depressive sxs. No anxiety. Denies sore throat, nasal congestion, nasal discharge, epistaxis, tinnitus, hearing loss, back pain, muscle pain, or joint pain. Current Outpatient Prescriptions   Medication Sig    oxyCODONE IR (ROXICODONE) 5 mg immediate release tablet TAKE ONE-HALF TO 1 TABLET EVERY 6 HOURS FOR 5 DAYS AS NEEDED FOR PAIN.  galantamine (RAZADYNE) 8 mg SR capsule take 1 capsule by mouth daily before BREAKFAST    memantine ER (NAMENDA XR) 21 mg capsule Take 1 Cap by mouth daily.  levETIRAcetam (KEPPRA) 750 mg tablet take 2 tablets by mouth twice a day    cyanocobalamin (VITAMIN B-12) 2,500 mcg sublingual tablet Take 2,500 mcg by mouth daily.  ASCORBATE CALCIUM (VITAMIN C PO) Take  by mouth.  Calcium-Cholecalciferol, D3, 600 mg(1,500mg) -400 unit chew Take  by mouth.  carvedilol (COREG) 3.125 mg tablet Take 1 Tab by mouth two (2) times daily (with meals).  aspirin 81 mg chewable tablet Take 81 mg by mouth nightly.     rosuvastatin (CRESTOR) 5 mg tablet Take 5 mg by mouth daily.  amlodipine (NORVASC) 5 mg tablet Take 5 mg by mouth daily. No current facility-administered medications for this visit. No Known Allergies  Past Medical History:   Diagnosis Date    CAD (coronary artery disease)     Hypercholesteremia     Hypertension     Seizures ([address]     last seizure in 2011 per pt     Past Surgical History:   Procedure Laterality Date    CARDIAC SURG PROCEDURE UNLIST      cardiac stents    HX APPENDECTOMY  1945    HX CHOLECYSTECTOMY  2007    NH EGD BALLOON DILATION ESOPHAGUS <30 MM DIAM  11/21/2012          Social History     Social History    Marital status:      Spouse name: N/A    Number of children: N/A    Years of education: N/A     Occupational History    Not on file. Social History Main Topics    Smoking status: Former Smoker    Smokeless tobacco: Never Used    Alcohol use No    Drug use: No    Sexual activity: Not on file     Other Topics Concern    Not on file     Social History Narrative     Family History   Problem Relation Age of Onset    Alcohol abuse Mother        PHYSICAL EXAMINATION:    Visit Vitals    /62    Pulse (!) 56    Resp 20    Ht 5' 8\" (1.727 m)    Wt 58.1 kg (128 lb)    SpO2 96%    BMI 19.46 kg/m2     General: Well defined, nourished, and groomed individual in no acute distress. Neck: Supple, nontender, no bruits, no pain with resistance to active range of motion. Heart: Regular rate and rhythm, no murmurs, rub, or gallop. Normal S1S2. Lungs: Clear to auscultation bilaterally with equal chest expansion, no cough, no wheeze   Musculoskeletal: Extremities revealed no edema and had full range of motion of joints. Psych: Good mood and bright affect   NEUROLOGICAL EXAMINATION:   Mental Status: Alert and oriented to person, place, and time   Cranial Nerves:   II, III, IV, VI: Visual acuity grossly intact.  Visual fields are normal.   Pupils are equal, round, and reactive to light and accommodation. Extra-ocular movements are full and fluid. Fundoscopic exam was benign, no ptosis or nystagmus. V-XII: Hearing is grossly intact. Facial features are symmetric, with normal sensation and strength. The palate rises symmetrically and the tongue protrudes midline. Sternocleidomastoids 5/5. Motor Examination: Normal tone, bulk, and strength, 5/5 muscle strength throughout. Coordination: No resting or intention tremor   Gait and Station: Steady while walking. No muscle wasting or fasiculations noted. Reflexes: DTRs 2+ throughout. ASSESSMENT AND PLAN    ICD-10-CM ICD-9-CM    1. 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 Complex partial seizures with consciousness impaired (HCC) G40.209 345.40 levETIRAcetam (KEPPRA) 750 mg tablet     Late onset AD without behavioral disturbance continues to be stable on present memory management regimen. He should continue to be as active both physically and mentally as possible. Epilepsy is stable since being on Keppra without sign of toxicity or side effect. No changes to his current plan of care. Follow up in six months or sooner if needed. Nevaeh Goff Gene

## 2018-06-22 ENCOUNTER — TELEPHONE (OUTPATIENT)
Dept: NEUROLOGY | Age: 83
End: 2018-06-22

## 2018-06-22 NOTE — TELEPHONE ENCOUNTER
----- Message from Melo Guaman sent at 6/22/2018  1:38 PM EDT -----  Regarding: CHANELLE Gross/ Telephone  Contact: [phone]  Ms. Honey Arias, patient daughter, requesting to speak with CHANELLE Jimenez Brightnnamdi in regards to a prescription.

## 2018-06-25 NOTE — TELEPHONE ENCOUNTER
Pt's daughter called and wanted to let us know that he is not taking the Namenda. She stated she was not sure why it was sent in the other day. This medication gave him a seizure when he tried taking it last time. She was just letting us knwo that he was not on this anymore. HE was taking the galantamine now .

## 2018-08-15 DIAGNOSIS — F02.80 ALZHEIMER'S TYPE DEMENTIA WITH LATE ONSET WITHOUT BEHAVIORAL DISTURBANCE (HCC): ICD-10-CM

## 2018-08-15 DIAGNOSIS — G30.1 ALZHEIMER'S TYPE DEMENTIA WITH LATE ONSET WITHOUT BEHAVIORAL DISTURBANCE (HCC): ICD-10-CM

## 2018-08-16 RX ORDER — GALANTAMINE HYDROBROMIDE 8 MG/1
CAPSULE, EXTENDED RELEASE ORAL
Qty: 90 CAP | Refills: 1 | Status: SHIPPED | OUTPATIENT
Start: 2018-08-16 | End: 2018-01-01 | Stop reason: SDUPTHER

## 2018-09-29 ENCOUNTER — HOSPITAL ENCOUNTER (INPATIENT)
Age: 83
LOS: 2 days | Discharge: HOME | DRG: 101 | End: 2018-10-01
Attending: EMERGENCY MEDICINE | Admitting: INTERNAL MEDICINE
Payer: MEDICARE

## 2018-09-29 ENCOUNTER — APPOINTMENT (OUTPATIENT)
Dept: CT IMAGING | Age: 83
DRG: 101 | End: 2018-09-29
Attending: EMERGENCY MEDICINE
Payer: MEDICARE

## 2018-09-29 DIAGNOSIS — N30.00 ACUTE CYSTITIS WITHOUT HEMATURIA: Primary | ICD-10-CM

## 2018-09-29 DIAGNOSIS — F02.80 ALZHEIMER'S TYPE DEMENTIA WITH LATE ONSET WITHOUT BEHAVIORAL DISTURBANCE (HCC): ICD-10-CM

## 2018-09-29 DIAGNOSIS — G30.1 ALZHEIMER'S TYPE DEMENTIA WITH LATE ONSET WITHOUT BEHAVIORAL DISTURBANCE (HCC): ICD-10-CM

## 2018-09-29 DIAGNOSIS — R56.9 SEIZURE (HCC): ICD-10-CM

## 2018-09-29 DIAGNOSIS — G93.40 ACUTE ENCEPHALOPATHY: ICD-10-CM

## 2018-09-29 PROBLEM — I48.91 ATRIAL FIBRILLATION WITH RVR (HCC): Status: ACTIVE | Noted: 2018-09-29

## 2018-09-29 LAB
ALBUMIN SERPL-MCNC: 4 G/DL (ref 3.5–5)
ALBUMIN/GLOB SERPL: 1 {RATIO} (ref 1.1–2.2)
ALP SERPL-CCNC: 98 U/L (ref 45–117)
ALT SERPL-CCNC: 20 U/L (ref 12–78)
ANION GAP SERPL CALC-SCNC: 5 MMOL/L (ref 5–15)
APPEARANCE UR: CLEAR
AST SERPL-CCNC: 14 U/L (ref 15–37)
ATRIAL RATE: 85 BPM
BACTERIA URNS QL MICRO: ABNORMAL /HPF
BASOPHILS # BLD: 0 K/UL (ref 0–0.1)
BASOPHILS NFR BLD: 0 % (ref 0–1)
BILIRUB SERPL-MCNC: 0.4 MG/DL (ref 0.2–1)
BILIRUB UR QL: NEGATIVE
BUN SERPL-MCNC: 17 MG/DL (ref 6–20)
BUN/CREAT SERPL: 16 (ref 12–20)
CALCIUM SERPL-MCNC: 9.3 MG/DL (ref 8.5–10.1)
CALCULATED P AXIS, ECG09: 40 DEGREES
CALCULATED R AXIS, ECG10: -17 DEGREES
CALCULATED T AXIS, ECG11: 43 DEGREES
CHLORIDE SERPL-SCNC: 101 MMOL/L (ref 97–108)
CO2 SERPL-SCNC: 30 MMOL/L (ref 21–32)
COLOR UR: ABNORMAL
COMMENT, HOLDF: NORMAL
CREAT SERPL-MCNC: 1.04 MG/DL (ref 0.7–1.3)
DIAGNOSIS, 93000: NORMAL
DIFFERENTIAL METHOD BLD: ABNORMAL
EOSINOPHIL # BLD: 0 K/UL (ref 0–0.4)
EOSINOPHIL NFR BLD: 0 % (ref 0–7)
EPITH CASTS URNS QL MICRO: ABNORMAL /LPF
ERYTHROCYTE [DISTWIDTH] IN BLOOD BY AUTOMATED COUNT: 12.5 % (ref 11.5–14.5)
GLOBULIN SER CALC-MCNC: 4.2 G/DL (ref 2–4)
GLUCOSE BLD STRIP.AUTO-MCNC: 149 MG/DL (ref 65–100)
GLUCOSE SERPL-MCNC: 118 MG/DL (ref 65–100)
GLUCOSE UR STRIP.AUTO-MCNC: NEGATIVE MG/DL
HCT VFR BLD AUTO: 37.4 % (ref 36.6–50.3)
HGB BLD-MCNC: 12.7 G/DL (ref 12.1–17)
HGB UR QL STRIP: NEGATIVE
IMM GRANULOCYTES # BLD: 0 K/UL (ref 0–0.04)
IMM GRANULOCYTES NFR BLD AUTO: 0 % (ref 0–0.5)
KETONES UR QL STRIP.AUTO: NEGATIVE MG/DL
LEUKOCYTE ESTERASE UR QL STRIP.AUTO: ABNORMAL
LYMPHOCYTES # BLD: 0.9 K/UL (ref 0.8–3.5)
LYMPHOCYTES NFR BLD: 10 % (ref 12–49)
MCH RBC QN AUTO: 33.5 PG (ref 26–34)
MCHC RBC AUTO-ENTMCNC: 34 G/DL (ref 30–36.5)
MCV RBC AUTO: 98.7 FL (ref 80–99)
MONOCYTES # BLD: 0.5 K/UL (ref 0–1)
MONOCYTES NFR BLD: 5 % (ref 5–13)
NEUTS SEG # BLD: 7.8 K/UL (ref 1.8–8)
NEUTS SEG NFR BLD: 85 % (ref 32–75)
NITRITE UR QL STRIP.AUTO: NEGATIVE
NRBC # BLD: 0 K/UL (ref 0–0.01)
NRBC BLD-RTO: 0 PER 100 WBC
P-R INTERVAL, ECG05: 248 MS
PH UR STRIP: 8 [PH] (ref 5–8)
PLATELET # BLD AUTO: 189 K/UL (ref 150–400)
PMV BLD AUTO: 10.6 FL (ref 8.9–12.9)
POTASSIUM SERPL-SCNC: 4.4 MMOL/L (ref 3.5–5.1)
PROT SERPL-MCNC: 8.2 G/DL (ref 6.4–8.2)
PROT UR STRIP-MCNC: 30 MG/DL
Q-T INTERVAL, ECG07: 358 MS
QRS DURATION, ECG06: 84 MS
QTC CALCULATION (BEZET), ECG08: 426 MS
RBC # BLD AUTO: 3.79 M/UL (ref 4.1–5.7)
RBC #/AREA URNS HPF: ABNORMAL /HPF (ref 0–5)
SAMPLES BEING HELD,HOLD: NORMAL
SERVICE CMNT-IMP: ABNORMAL
SODIUM SERPL-SCNC: 136 MMOL/L (ref 136–145)
SP GR UR REFRACTOMETRY: 1.01 (ref 1–1.03)
UROBILINOGEN UR QL STRIP.AUTO: 0.2 EU/DL (ref 0.2–1)
VENTRICULAR RATE, ECG03: 85 BPM
WBC # BLD AUTO: 9.3 K/UL (ref 4.1–11.1)
WBC URNS QL MICRO: ABNORMAL /HPF (ref 0–4)

## 2018-09-29 PROCEDURE — 96365 THER/PROPH/DIAG IV INF INIT: CPT

## 2018-09-29 PROCEDURE — 81001 URINALYSIS AUTO W/SCOPE: CPT | Performed by: EMERGENCY MEDICINE

## 2018-09-29 PROCEDURE — 36415 COLL VENOUS BLD VENIPUNCTURE: CPT | Performed by: EMERGENCY MEDICINE

## 2018-09-29 PROCEDURE — 85025 COMPLETE CBC W/AUTO DIFF WBC: CPT | Performed by: EMERGENCY MEDICINE

## 2018-09-29 PROCEDURE — 80053 COMPREHEN METABOLIC PANEL: CPT | Performed by: EMERGENCY MEDICINE

## 2018-09-29 PROCEDURE — 99285 EMERGENCY DEPT VISIT HI MDM: CPT

## 2018-09-29 PROCEDURE — 74011250637 HC RX REV CODE- 250/637: Performed by: INTERNAL MEDICINE

## 2018-09-29 PROCEDURE — 74011250636 HC RX REV CODE- 250/636: Performed by: EMERGENCY MEDICINE

## 2018-09-29 PROCEDURE — 74011000258 HC RX REV CODE- 258: Performed by: EMERGENCY MEDICINE

## 2018-09-29 PROCEDURE — 87086 URINE CULTURE/COLONY COUNT: CPT | Performed by: INTERNAL MEDICINE

## 2018-09-29 PROCEDURE — 87186 SC STD MICRODIL/AGAR DIL: CPT | Performed by: INTERNAL MEDICINE

## 2018-09-29 PROCEDURE — 93005 ELECTROCARDIOGRAM TRACING: CPT

## 2018-09-29 PROCEDURE — 87077 CULTURE AEROBIC IDENTIFY: CPT | Performed by: INTERNAL MEDICINE

## 2018-09-29 PROCEDURE — 74011250636 HC RX REV CODE- 250/636: Performed by: INTERNAL MEDICINE

## 2018-09-29 PROCEDURE — 82962 GLUCOSE BLOOD TEST: CPT

## 2018-09-29 PROCEDURE — 94760 N-INVAS EAR/PLS OXIMETRY 1: CPT

## 2018-09-29 PROCEDURE — 77010033678 HC OXYGEN DAILY

## 2018-09-29 PROCEDURE — 96375 TX/PRO/DX INJ NEW DRUG ADDON: CPT

## 2018-09-29 PROCEDURE — 70450 CT HEAD/BRAIN W/O DYE: CPT

## 2018-09-29 PROCEDURE — 65660000000 HC RM CCU STEPDOWN

## 2018-09-29 RX ORDER — ENOXAPARIN SODIUM 100 MG/ML
40 INJECTION SUBCUTANEOUS EVERY 24 HOURS
Status: DISCONTINUED | OUTPATIENT
Start: 2018-09-29 | End: 2018-09-29

## 2018-09-29 RX ORDER — ATORVASTATIN CALCIUM 10 MG/1
10 TABLET, FILM COATED ORAL
Status: DISCONTINUED | OUTPATIENT
Start: 2018-09-29 | End: 2018-10-01 | Stop reason: HOSPADM

## 2018-09-29 RX ORDER — LORAZEPAM 2 MG/ML
0.5 INJECTION INTRAMUSCULAR
Status: COMPLETED | OUTPATIENT
Start: 2018-09-29 | End: 2018-09-29

## 2018-09-29 RX ORDER — ONDANSETRON 2 MG/ML
4 INJECTION INTRAMUSCULAR; INTRAVENOUS
Status: DISCONTINUED | OUTPATIENT
Start: 2018-09-29 | End: 2018-10-01 | Stop reason: HOSPADM

## 2018-09-29 RX ORDER — HEPARIN SODIUM 5000 [USP'U]/ML
5000 INJECTION, SOLUTION INTRAVENOUS; SUBCUTANEOUS EVERY 12 HOURS
Status: DISCONTINUED | OUTPATIENT
Start: 2018-09-29 | End: 2018-10-01 | Stop reason: HOSPADM

## 2018-09-29 RX ORDER — DILTIAZEM HYDROCHLORIDE 30 MG/1
30 TABLET, FILM COATED ORAL
Status: DISCONTINUED | OUTPATIENT
Start: 2018-09-29 | End: 2018-09-30

## 2018-09-29 RX ORDER — SODIUM CHLORIDE 0.9 % (FLUSH) 0.9 %
5-10 SYRINGE (ML) INJECTION AS NEEDED
Status: DISCONTINUED | OUTPATIENT
Start: 2018-09-29 | End: 2018-10-01 | Stop reason: HOSPADM

## 2018-09-29 RX ORDER — GALANTAMINE 4 MG/1
8 TABLET, FILM COATED ORAL 2 TIMES DAILY WITH MEALS
Status: DISCONTINUED | OUTPATIENT
Start: 2018-09-29 | End: 2018-10-01 | Stop reason: HOSPADM

## 2018-09-29 RX ORDER — CEPHALEXIN 250 MG/1
500 CAPSULE ORAL
Status: DISCONTINUED | OUTPATIENT
Start: 2018-09-29 | End: 2018-09-29

## 2018-09-29 RX ORDER — NALOXONE HYDROCHLORIDE 0.4 MG/ML
0.4 INJECTION, SOLUTION INTRAMUSCULAR; INTRAVENOUS; SUBCUTANEOUS AS NEEDED
Status: DISCONTINUED | OUTPATIENT
Start: 2018-09-29 | End: 2018-10-01 | Stop reason: HOSPADM

## 2018-09-29 RX ORDER — DILTIAZEM HYDROCHLORIDE 30 MG/1
30 TABLET, FILM COATED ORAL
Status: DISCONTINUED | OUTPATIENT
Start: 2018-09-29 | End: 2018-09-29

## 2018-09-29 RX ORDER — SODIUM CHLORIDE 9 MG/ML
75 INJECTION, SOLUTION INTRAVENOUS CONTINUOUS
Status: DISCONTINUED | OUTPATIENT
Start: 2018-09-29 | End: 2018-10-01

## 2018-09-29 RX ORDER — FACIAL-BODY WIPES
10 EACH TOPICAL DAILY PRN
Status: DISCONTINUED | OUTPATIENT
Start: 2018-09-29 | End: 2018-10-01 | Stop reason: HOSPADM

## 2018-09-29 RX ORDER — ACETAMINOPHEN 325 MG/1
650 TABLET ORAL
Status: DISCONTINUED | OUTPATIENT
Start: 2018-09-29 | End: 2018-10-01 | Stop reason: HOSPADM

## 2018-09-29 RX ORDER — LEVETIRACETAM 500 MG/1
1500 TABLET ORAL 2 TIMES DAILY
Status: DISCONTINUED | OUTPATIENT
Start: 2018-09-29 | End: 2018-10-01 | Stop reason: HOSPADM

## 2018-09-29 RX ORDER — CARVEDILOL 3.12 MG/1
3.12 TABLET ORAL 2 TIMES DAILY WITH MEALS
Status: DISCONTINUED | OUTPATIENT
Start: 2018-09-29 | End: 2018-10-01 | Stop reason: HOSPADM

## 2018-09-29 RX ORDER — AMLODIPINE BESYLATE 5 MG/1
5 TABLET ORAL DAILY
Status: DISCONTINUED | OUTPATIENT
Start: 2018-09-29 | End: 2018-09-29

## 2018-09-29 RX ORDER — GUAIFENESIN 100 MG/5ML
81 LIQUID (ML) ORAL
Status: DISCONTINUED | OUTPATIENT
Start: 2018-09-29 | End: 2018-10-01 | Stop reason: HOSPADM

## 2018-09-29 RX ORDER — SODIUM CHLORIDE 0.9 % (FLUSH) 0.9 %
5-10 SYRINGE (ML) INJECTION EVERY 8 HOURS
Status: DISCONTINUED | OUTPATIENT
Start: 2018-09-29 | End: 2018-10-01 | Stop reason: HOSPADM

## 2018-09-29 RX ADMIN — HEPARIN SODIUM 5000 UNITS: 5000 INJECTION INTRAVENOUS; SUBCUTANEOUS at 21:13

## 2018-09-29 RX ADMIN — CEFTRIAXONE 1 G: 1 INJECTION, POWDER, FOR SOLUTION INTRAMUSCULAR; INTRAVENOUS at 09:08

## 2018-09-29 RX ADMIN — HEPARIN SODIUM 5000 UNITS: 5000 INJECTION INTRAVENOUS; SUBCUTANEOUS at 12:51

## 2018-09-29 RX ADMIN — LEVETIRACETAM 500 MG: 100 INJECTION, SOLUTION, CONCENTRATE INTRAVENOUS at 07:40

## 2018-09-29 RX ADMIN — LORAZEPAM 0.5 MG: 2 INJECTION INTRAMUSCULAR; INTRAVENOUS at 07:49

## 2018-09-29 RX ADMIN — SODIUM CHLORIDE 75 ML/HR: 900 INJECTION, SOLUTION INTRAVENOUS at 12:41

## 2018-09-29 RX ADMIN — LEVETIRACETAM 1500 MG: 500 TABLET, FILM COATED ORAL at 12:51

## 2018-09-29 RX ADMIN — DILTIAZEM HYDROCHLORIDE 30 MG: 30 TABLET, FILM COATED ORAL at 21:13

## 2018-09-29 RX ADMIN — DILTIAZEM HYDROCHLORIDE 30 MG: 30 TABLET, FILM COATED ORAL at 11:10

## 2018-09-29 RX ADMIN — ATORVASTATIN CALCIUM 10 MG: 10 TABLET, FILM COATED ORAL at 21:13

## 2018-09-29 RX ADMIN — LEVETIRACETAM 1500 MG: 500 TABLET, FILM COATED ORAL at 17:25

## 2018-09-29 RX ADMIN — SODIUM CHLORIDE 500 ML: 900 INJECTION, SOLUTION INTRAVENOUS at 07:36

## 2018-09-29 RX ADMIN — DILTIAZEM HYDROCHLORIDE 30 MG: 30 TABLET, FILM COATED ORAL at 17:25

## 2018-09-29 RX ADMIN — Medication 10 ML: at 14:48

## 2018-09-29 RX ADMIN — GALANTAMINE 8 MG: 4 TABLET, FILM COATED ORAL at 17:25

## 2018-09-29 RX ADMIN — ASPIRIN 81 MG 81 MG: 81 TABLET ORAL at 21:13

## 2018-09-29 RX ADMIN — CARVEDILOL 3.12 MG: 3.12 TABLET, FILM COATED ORAL at 17:25

## 2018-09-29 NOTE — PROGRESS NOTES
Bedside shift change report given to Mandie Tamayo RN (oncoming nurse) by Shae Allen RN (offgoing nurse). Report included the following information SBAR, Intake/Output, MAR, Accordion and Recent Results. Zone Phone:   [phone] Significant changes during shift:  Admitted Patient Information Sushil Rizo Sr. 
80 y.o. 
9/29/2018  6:33 AM by Michail Goltz, MD. Sushil Rizo Sr. was admitted from Home 
 
Problem List 
 
Patient Active Problem List  
 Diagnosis Date Noted  Acute encephalopathy 09/29/2018  Atrial fibrillation with RVR ([facility] [address] 09/29/2018  Alzheimer's type dementia with late onset without behavioral disturbance 06/12/2017  Complex partial seizures with consciousness impaired ([facility] [address] 12/21/2015  Elevated troponin 07/29/2014  CAD (coronary artery disease) 07/28/2014  
 HTN (hypertension) 07/28/2014  Hyperlipidemia 07/28/2014 Past Medical History:  
Diagnosis Date  CAD (coronary artery disease)  Hypercholesteremia  Hypertension  Seizures ([facility] [address]   
 last seizure in 2011 per pt Core Measures: CVA: No No 
 
Activity Status: OOB to Chair No 
Ambulated this shift No  
Bed Rest No 
 
DVT prophylaxis: DVT prophylaxis Med- Yes DVT prophylaxis SCD or AMBER- No  
 
Patient Safety: 
 
Falls Score Total Score: 3 Safety Level_______ Bed Alarm On? Yes Sitter? No 
 
Plan for upcoming shift: Monitor, Echo Discharge Plan: No TBD Active Consults: 
IP CONSULT TO NEUROLOGY 
IP CONSULT TO CARDIOLOGY

## 2018-09-29 NOTE — DISCHARGE INSTRUCTIONS

## 2018-09-29 NOTE — PROGRESS NOTES
Patient is A&Ox 4 and passed dysphagia screening. Spoke with Dr. Ervin Guevara about changing diet. Agreed to Cardiac Regular.

## 2018-09-29 NOTE — CONSULTS
NEUROLOGY NOTE Chief Complaint Patient presents with  Seizure Reason for Consult I have been asked by Daniel Kingsley MD to see the patient in neurological consultation to render advice and opinion regarding seizure. HPI Elan Mcbride is a 80 y.o. male who presents to the hospital because of altered mental status. He does have hx of seizures which started around 12 yrs ago. Last sz was a number of yrs ago and is maintained on keppra 1500 mg po bid. He does have early stages of dementia as well and is on galantamine. He was seen by her daughter in the morning and he was in the bed and was not responding. He was just stareing which got better with time. He usually has staring spells with hand and mouth automatism. He was also found to have UTI. ROS A ten system review of constitutional, cardiovascular, respiratory, musculoskeletal, endocrine, skin, SHEENT, genitourinary, psychiatric and neurologic systems was obtained and is unremarkable except as stated in HPI  
 
PMH Past Medical History:  
Diagnosis Date  CAD (coronary artery disease)  Hypercholesteremia  Hypertension  Seizures ([address]   
 last seizure in 2011 per pt  
 
 
FH Family History Problem Relation Age of Onset  Alcohol abuse Mother 31 [facility] Social History Social History  Marital status:  Spouse name: N/A  
 Number of children: N/A  
 Years of education: N/A Social History Main Topics  Smoking status: Former Smoker  Smokeless tobacco: Never Used  Alcohol use No  
 Drug use: No  
 Sexual activity: Not on file Other Topics Concern  Not on file Social History Narrative ALLERGIES No Known Allergies PHYSICAL EXAMINATION:  
Patient Vitals for the past 24 hrs: 
 Temp Pulse Resp BP SpO2  
09/29/18 1015 - (!) 116 16 138/59 96 %  
09/29/18 1000 - (!) 107 16 145/70 96 %  
09/29/18 0945 - (!) 132 21 152/74 96 %  
09/29/18 0936 - (!) 120 20 - 97 % 09/29/18 0935 - - - 130/79 -  
09/29/18 0913 - (!) 113 16 150/64 95 % 09/29/18 0705 - 85 18 148/61 96 %  
09/29/18 0645 - 88 - 161/63 95 % 09/29/18 0640 97.9 °F (36.6 °C) 89 15 160/51 95 % General:  
General appearance: Pt is in no acute distress Distal pulses are preserved Fundoscopic exam: attempted Neurological Examination:  
Mental Status:  AAO x2. Speech is fluent. Follows commands, has abnormal fund of knowledge, attention, short term recall, comprehension and insight. Cranial Nerves: Visual fields are full. PERRL, Extraocular movements are full. Facial sensation intact. Facial movement intact. Hearing intact to conversation. Palate elevates symmetrically. Shoulder shrug symmetric. Tongue midline. Motor: Strength is 5/5 in UE and 4/5 in LE. Normal tone. No atrophy. Sensation: Normal to light touch Reflexes: DTRs 2+ in UE and knees. Absent ankles. Plantar responses downgoing. Coordination/Cerebellar: Unable to assess Gait: deferred Skin: No significant bruising or lacerations. LAB DATA REVIEWED:   
Recent Results (from the past 24 hour(s)) GLUCOSE, POC Collection Time: 09/29/18  6:39 AM  
Result Value Ref Range Glucose (POC) 149 (H) 65 - 100 mg/dL Performed by Milly Orellana   
CBC WITH AUTOMATED DIFF Collection Time: 09/29/18  7:09 AM  
Result Value Ref Range WBC 9.3 4.1 - 11.1 K/uL  
 RBC 3.79 (L) 4.10 - 5.70 M/uL  
 HGB 12.7 12.1 - 17.0 g/dL HCT 37.4 36.6 - 50.3 % MCV 98.7 80.0 - 99.0 FL  
 MCH 33.5 26.0 - 34.0 PG  
 MCHC 34.0 30.0 - 36.5 g/dL  
 RDW 12.5 11.5 - 14.5 % PLATELET 528 238 - 422 K/uL MPV 10.6 8.9 - 12.9 FL  
 NRBC 0.0 0  WBC ABSOLUTE NRBC 0.00 0.00 - 0.01 K/uL NEUTROPHILS 85 (H) 32 - 75 % LYMPHOCYTES 10 (L) 12 - 49 % MONOCYTES 5 5 - 13 % EOSINOPHILS 0 0 - 7 % BASOPHILS 0 0 - 1 % IMMATURE GRANULOCYTES 0 0.0 - 0.5 % ABS. NEUTROPHILS 7.8 1.8 - 8.0 K/UL  
 ABS. LYMPHOCYTES 0.9 0.8 - 3.5 K/UL ABS. MONOCYTES 0.5 0.0 - 1.0 K/UL  
 ABS. EOSINOPHILS 0.0 0.0 - 0.4 K/UL  
 ABS. BASOPHILS 0.0 0.0 - 0.1 K/UL  
 ABS. IMM. GRANS. 0.0 0.00 - 0.04 K/UL  
 DF AUTOMATED METABOLIC PANEL, COMPREHENSIVE Collection Time: 09/29/18  7:09 AM  
Result Value Ref Range Sodium 136 136 - 145 mmol/L Potassium 4.4 3.5 - 5.1 mmol/L Chloride 101 97 - 108 mmol/L  
 CO2 30 21 - 32 mmol/L Anion gap 5 5 - 15 mmol/L Glucose 118 (H) 65 - 100 mg/dL BUN 17 6 - 20 MG/DL Creatinine 1.04 0.70 - 1.30 MG/DL  
 BUN/Creatinine ratio 16 12 - 20 GFR est AA >60 >60 ml/min/1.73m2 GFR est non-AA >60 >60 ml/min/1.73m2 Calcium 9.3 8.5 - 10.1 MG/DL Bilirubin, total 0.4 0.2 - 1.0 MG/DL  
 ALT (SGPT) 20 12 - 78 U/L  
 AST (SGOT) 14 (L) 15 - 37 U/L Alk. phosphatase 98 45 - 117 U/L Protein, total 8.2 6.4 - 8.2 g/dL Albumin 4.0 3.5 - 5.0 g/dL Globulin 4.2 (H) 2.0 - 4.0 g/dL A-G Ratio 1.0 (L) 1.1 - 2.2 SAMPLES BEING HELD Collection Time: 09/29/18  7:09 AM  
Result Value Ref Range SAMPLES BEING HELD 1 RED 1 LT BLUE 1 GREY   
 COMMENT Add-on orders for these samples will be processed based on acceptable specimen integrity and analyte stability, which may vary by analyte. URINALYSIS W/MICROSCOPIC Collection Time: 09/29/18  8:14 AM  
Result Value Ref Range Color YELLOW/STRAW Appearance CLEAR CLEAR Specific gravity 1.014 1.003 - 1.030    
 pH (UA) 8.0 5.0 - 8.0 Protein 30 (A) NEG mg/dL Glucose NEGATIVE  NEG mg/dL Ketone NEGATIVE  NEG mg/dL Bilirubin NEGATIVE  NEG Blood NEGATIVE  NEG Urobilinogen 0.2 0.2 - 1.0 EU/dL Nitrites NEGATIVE  NEG Leukocyte Esterase TRACE (A) NEG    
 WBC 10-20 0 - 4 /hpf  
 RBC 5-10 0 - 5 /hpf Epithelial cells FEW FEW /lpf Bacteria 1+ (A) NEG /hpf Imaging review: 
CT Head Normal 
 
2014 EEG This is a normal electroencephalogram with the patient awake and asleep, showing no clear focal abnormalities or epileptiform activity. A normal EEG doesn't not rule out seizures. Clinical  
correlation recommended. HOME MEDS Prior to Admission Medications Prescriptions Last Dose Informant Patient Reported? Taking? ASCORBATE CALCIUM (VITAMIN C PO)   Yes No  
Sig: Take  by mouth. Calcium-Cholecalciferol, D3, 600 mg(1,500mg) -400 unit chew   Yes No  
Sig: Take  by mouth. amlodipine (NORVASC) 5 mg tablet   Yes No  
Sig: Take 5 mg by mouth daily. aspirin 81 mg chewable tablet   Yes No  
Sig: Take 81 mg by mouth nightly. carvedilol (COREG) 3.125 mg tablet   No No  
Sig: Take 1 Tab by mouth two (2) times daily (with meals). cyanocobalamin (VITAMIN B-12) 2,500 mcg sublingual tablet   Yes No  
Sig: Take 2,500 mcg by mouth daily. galantamine (RAZADYNE) 8 mg SR capsule   No No  
Sig: take 1 capsule by mouth daily before BREAKFAST  
levETIRAcetam (KEPPRA) 750 mg tablet   No No  
Sig: take 2 tablets by mouth twice a day  
rosuvastatin (CRESTOR) 5 mg tablet   Yes No  
Sig: Take 5 mg by mouth daily. Facility-Administered Medications: None CURRENT MEDS Current Facility-Administered Medications Medication Dose Route Frequency  sodium chloride (NS) flush 5-10 mL  5-10 mL IntraVENous Q8H  
 0.9% sodium chloride infusion  75 mL/hr IntraVENous CONTINUOUS  
 [START ON 9/30/2018] cefTRIAXone (ROCEPHIN) 1 g in 0.9% sodium chloride (MBP/ADV) 50 mL  1 g IntraVENous ONCE  
 galantamine (RAZADYNE) tablet 8 mg  8 mg Oral BID WITH MEALS  
 aspirin chewable tablet 81 mg  81 mg Oral QHS  carvedilol (COREG) tablet 3.125 mg  3.125 mg Oral BID WITH MEALS  
 atorvastatin (LIPITOR) tablet 10 mg  10 mg Oral QHS  heparin (porcine) injection 5,000 Units  5,000 Units SubCUTAneous Q12H  levETIRAcetam (KEPPRA) tablet 1,500 mg  1,500 mg Oral BID  dilTIAZem (CARDIZEM) IR tablet 30 mg  30 mg Oral AC&HS IMPRESSION: 
 Eddie Mendes is a 80 y.o. male who presents with breakthrough seizure in the setting of UTI. Seizures have been well controlled on keppra 1500 mg po bid and this most probably was triggered secondary to UTI. Pt does also have dementia. He does also have new onset A fib. RECOMMENDATIONS: 
1. Cont Keppra 1500 mg po bid 2. Seizure precautions 3. EEG 4. Galantamine home dosage for dementia 5. Cardiology consult for new onset A fib 6. Treatment of UTI as per primary team. 
 
Thank you very much for this consultation.   
 
Jony Gutierrez MD 
Neurologist

## 2018-09-29 NOTE — H&P
Hospitalist Admission Note NAME:  Milo Perez Sr.  
:   1927 MRN:   [id number] Date of admit: 2018 PCP: Jyoti Mclaughlin NP Assessment/Plan:  
 
 
Acute encephalopathy POA Typical seizure this morning, first seizure in many years per daughter.   
    Typically postictal following his seizures, still not back to baseline after observation in the emergency room. Other concern is possible UTI causing encephalopathy. Continue Keppra for seizures. Consult neurology. Treat under underlying urinary tract infection We will follow mental status hopefully should improve with treatment of the UTI and time from the seizure. PT and OT consults Seizure POA Typically gets mild shaking and staring followed by weakness and confusion that gradually improves. First seizure many years per daughter. Compliant with the Keppra. Continue Keppra 1500 mg p.o. twice daily, received IV dose in  mg Ask neurology to evaluate Head CT scan in the emergency room was unremarkable Possible UTI as trigger Daughter also notes he was anxious about a visit to an  regarding an accident, she wonders whether this was a stress trigger Seizure precautions UTI POA in setting of encephalopathy and seizure Asked emergency room to send urine culture IV ceftriaxone until culture back, will adjust based on culture results No SIRS criteria New onset atrial fibrillation in the ED with RVR POA Initial EKG with normal sinus rhythm with first-degree AV block While in the emergency room developed atrial fibrillation with RVR heart rates generally less than 120 Hold Norvasc to allow addition of Po cardizem P.o. Cardizem for now Continue Coreg Telemetry Discussed the case with Dr. Ami Garsia, he will see in consultation(prior saw Dr Joshua Chavis) Check TSH Coronary artery disease status post stent POA Hypercholesterolemia POA Hypertension POA Continue aspirin and Crestor Continue Coreg we will transition from Norvasc to Cardizem for rate control No ischemic changes on EKG Cardiology to see patient Systolic murmur POA suspect aortic sclerosis(seen on echo in 2014) Will defer to cardiology if repeat echo needed Baseline dementia continue home meds Body mass index is 19.46 kg/(m^2). Given the patient's current clinical presentation, I have a high level of concern for decompensation if discharged from the emergency department. My assessment of this patient's clinical condition and my plan of care is as noted above. DVT prophylaxis with lovenox Code status: DNR/DNI, d/w mPOA Tati at bedside NOK: Daughter Justin Davis History Chief complaint:  brought in by family after seizure this morning,  developed atrial fibrillation with RVR in the emergency room History of present illness: 
55-year-old white male who currently lives with his daughter Justin Davis, who is mPOA.   
Known seizure disorder on keppra x years, has not a seizure they are aware of in many years Doing well recently until this morning.   
Daughter found the patient having a seizure.   
       Arms were shaking mildly and he was staring off into space and not responsive, typical for his prior seizures Afterwards he was very lethargic and not at his baseline mental status.   
The family brought him to the emergency room. No recent headaches or fevers or cough or chest pain or shortness of breath or nausea vomiting or diarrhea In the ED, he was very lethargic initially He then gradually began to wake up and talk, but was still very confused and not at baseline even when I saw .   
No further seizures in the emergency room. Pt denied pain, headache or SOB No SIRS citeria   
UA showed possible evidence of UTI.   
Head CT scan was unremarkable.   
Received IV keppra 500 mg in ED Plan was for discharge home if MS improved, but it did not Developed atrial fibrillation with RVR while in the emergency room.   
Because of the prolonged confusion and concern for encephalopathy either from postictal state or UTI, and the new onset atrial fibrillation, we were called to admit the patient. Past Medical History:  
Diagnosis Date  CAD (coronary artery disease)  Hypercholesteremia  Hypertension  Seizures ([address]   
 last seizure in 2011 per pt Past Surgical History:  
Procedure Laterality Date  CARDIAC SURG PROCEDURE UNLIST    
 cardiac stents [address]  HX CHOLECYSTECTOMY  2007  MI EGD BALLOON DILATION ESOPHAGUS <30 MM DIAM  11/21/2012 Social History Substance Use Topics  Smoking status: Former Smoker  Smokeless tobacco: Never Used  Alcohol use No  
 Lives with daughter who is mPOA 
DNR per daughter Family History Problem Relation Age of Onset  Alcohol abuse Mother No Known Allergies Prior to Admission medications Medication Sig Start Date End Date Taking? Authorizing Provider  
galantamine (RAZADYNE) 8 mg SR capsule take 1 capsule by mouth daily before BREAKFAST 8/16/18   Shimon Rincon NP  
levETIRAcetam (KEPPRA) 750 mg tablet take 2 tablets by mouth twice a day 6/18/18   Shane Vargas NP  
cyanocobalamin (VITAMIN B-12) 2,500 mcg sublingual tablet Take 2,500 mcg by mouth daily. Historical Provider ASCORBATE CALCIUM (VITAMIN C PO) Take  by mouth. Historical Provider Calcium-Cholecalciferol, D3, 600 mg(1,500mg) -400 unit chew Take  by mouth. Historical Provider  
carvedilol (COREG) 3.125 mg tablet Take 1 Tab by mouth two (2) times daily (with meals). 7/30/14   Anjana Thrasher MD  
aspirin 81 mg chewable tablet Take 81 mg by mouth nightly. Historical Provider  
rosuvastatin (CRESTOR) 5 mg tablet Take 5 mg by mouth daily. Historical Provider  
amlodipine (NORVASC) 5 mg tablet Take 5 mg by mouth daily. Carlie Arriaga MD  
 
 
Review of symptoms: POSITIVE= Bold  Negative = not bold General:  fever, chills, sweats Eyes:    blurred vision, eye pain, double vision ENT:    Coryza, sore throat, trouble swallowing Respiratory:   cough, sputum, SOB Cardiology:   chest pain, orthopnea, PND, edema Gastrointestinal:  abdominal pain , N/V, diarrhea, constipation, melena or BRBPR Genitourinary:  Urgency, dysuria, hematuria Muskuloskeletal :  Joint redness, swelling or acute joint pain, myalgias Hematology:  easy bruising, nose or gum bleeding Dermatological: rash, ulceration Endocrine:   Polyuria or polydipsia, heat or hold intolerance Neurological:  Headache, focal motor or sensory changes Speech difficulties, memory loss Seizure with prolonged confusion Psychological: depression, agitation Objective: VITALS:   
Patient Vitals for the past 24 hrs: 
 Temp Pulse Resp BP SpO2  
18 0913 - (!) 113 16 150/64 95 % 18 0705 - 85 18 148/61 96 %  
18 0645 - 88 - 161/63 95 % 18 0640 97.9 °F (36.6 °C) 89 15 160/51 95 % Temp (24hrs), Av.9 °F (36.6 °C), Min:97.9 °F (36.6 °C), Max:97.9 °F (36.6 °C) O2 Device: Room air Wt Readings from Last 12 Encounters:  
18 58.1 kg (128 lb)  
18 58.1 kg (128 lb) 17 57.2 kg (126 lb) 04/10/17 60.8 kg (134 lb) 16 61.2 kg (135 lb) 12/21/15 63.5 kg (140 lb 1.6 oz)  
06/24/15 66.8 kg (147 lb 3.2 oz)  
14 67.6 kg (149 lb) 14 66.2 kg (146 lb)  
14 65.7 kg (144 lb 13.5 oz) 14 69.4 kg (153 lb) 14 68.3 kg (150 lb 8 oz) PHYSICAL EXAM:  
General:    Sleepy but arousable cooperative in no distress HEENT: Normocephalic, atraumatic    PERRL, Sclera no icterus Nasal mucosa without masses or discharge  Hearing intact to voice Oropharynx without erythema or exudate   Pink MM Neck:  No meningismus, trachea midline, no carotid bruits Thyroid not enlarged, no nodules or tenderness Lungs:   Clear to auscultation bilaterally. No wheezing or rales No accessory muscle use or retractions. Heart:   Irregular rate and rhythm,  grade murmur or gallop. No LE edema Abdomen:   Soft, non-tender. Not distended. Bowel sounds normal.  
  No masses, No Hepatosplenomegaly, No Rebound or guarding Lymph nodes: No cervical or inguinal MARCOS Musculoskeletal:  No Joint swelling, erythema, warmth. No Cyanosis or clubbing Skin:      No rashes Not Jaundiced   No nodules or thickening Capillary refill normal 
Neurologic: Sleepy but arousable, oriented X 1, follows commands, family says still not at baseline Cranial nerves 2 to 12 intact Moves all limbs symmetrically LAB DATA REVIEWED:   
Recent Results (from the past 12 hour(s)) GLUCOSE, POC Collection Time: 09/29/18  6:39 AM  
Result Value Ref Range Glucose (POC) 149 (H) 65 - 100 mg/dL Performed by Tammy Castro   
CBC WITH AUTOMATED DIFF Collection Time: 09/29/18  7:09 AM  
Result Value Ref Range WBC 9.3 4.1 - 11.1 K/uL  
 RBC 3.79 (L) 4.10 - 5.70 M/uL  
 HGB 12.7 12.1 - 17.0 g/dL HCT 37.4 36.6 - 50.3 % MCV 98.7 80.0 - 99.0 FL  
 MCH 33.5 26.0 - 34.0 PG  
 MCHC 34.0 30.0 - 36.5 g/dL  
 RDW 12.5 11.5 - 14.5 % PLATELET 561 994 - 709 K/uL MPV 10.6 8.9 - 12.9 FL  
 NRBC 0.0 0  WBC ABSOLUTE NRBC 0.00 0.00 - 0.01 K/uL NEUTROPHILS 85 (H) 32 - 75 % LYMPHOCYTES 10 (L) 12 - 49 % MONOCYTES 5 5 - 13 % EOSINOPHILS 0 0 - 7 % BASOPHILS 0 0 - 1 % IMMATURE GRANULOCYTES 0 0.0 - 0.5 % ABS. NEUTROPHILS 7.8 1.8 - 8.0 K/UL  
 ABS. LYMPHOCYTES 0.9 0.8 - 3.5 K/UL  
 ABS. MONOCYTES 0.5 0.0 - 1.0 K/UL  
 ABS. EOSINOPHILS 0.0 0.0 - 0.4 K/UL  
 ABS. BASOPHILS 0.0 0.0 - 0.1 K/UL  
 ABS. IMM. GRANS. 0.0 0.00 - 0.04 K/UL  
 DF AUTOMATED METABOLIC PANEL, COMPREHENSIVE Collection Time: 09/29/18  7:09 AM  
Result Value Ref Range Sodium 136 136 - 145 mmol/L  Potassium 4.4 3.5 - 5.1 mmol/L  
 Chloride 101 97 - 108 mmol/L  
 CO2 30 21 - 32 mmol/L Anion gap 5 5 - 15 mmol/L Glucose 118 (H) 65 - 100 mg/dL BUN 17 6 - 20 MG/DL Creatinine 1.04 0.70 - 1.30 MG/DL  
 BUN/Creatinine ratio 16 12 - 20 GFR est AA >60 >60 ml/min/1.73m2 GFR est non-AA >60 >60 ml/min/1.73m2 Calcium 9.3 8.5 - 10.1 MG/DL Bilirubin, total 0.4 0.2 - 1.0 MG/DL  
 ALT (SGPT) 20 12 - 78 U/L  
 AST (SGOT) 14 (L) 15 - 37 U/L Alk. phosphatase 98 45 - 117 U/L Protein, total 8.2 6.4 - 8.2 g/dL Albumin 4.0 3.5 - 5.0 g/dL Globulin 4.2 (H) 2.0 - 4.0 g/dL A-G Ratio 1.0 (L) 1.1 - 2.2 SAMPLES BEING HELD Collection Time: 09/29/18  7:09 AM  
Result Value Ref Range SAMPLES BEING HELD 1 RED 1 LT BLUE 1 GREY   
 COMMENT Add-on orders for these samples will be processed based on acceptable specimen integrity and analyte stability, which may vary by analyte. URINALYSIS W/MICROSCOPIC Collection Time: 09/29/18  8:14 AM  
Result Value Ref Range Color YELLOW/STRAW Appearance CLEAR CLEAR Specific gravity 1.014 1.003 - 1.030    
 pH (UA) 8.0 5.0 - 8.0 Protein 30 (A) NEG mg/dL Glucose NEGATIVE  NEG mg/dL Ketone NEGATIVE  NEG mg/dL Bilirubin NEGATIVE  NEG Blood NEGATIVE  NEG Urobilinogen 0.2 0.2 - 1.0 EU/dL Nitrites NEGATIVE  NEG Leukocyte Esterase TRACE (A) NEG    
 WBC 10-20 0 - 4 /hpf  
 RBC 5-10 0 - 5 /hpf Epithelial cells FEW FEW /lpf Bacteria 1+ (A) NEG /hpf Initial EKG read by myself NSR with 1st degree AVB, rate 85, LAD, no LVH Other intervals normal 
Septal Q-waves Repeat EKG read by myself in ED atrial fib with RVR rate 119, no ischemic changes CT scan head FINDINGS: 
The ventricles are normal in size and position. Basilar cisterns are patent. No 
midline shift. There is no evidence of acute infarct, hemorrhage, or extraaxial 
fluid collection. The paranasal sinuses, mastoid air cells, and middle ears are clear.  The orbital 
 contents are within normal limits. There are no significant osseous or 
extracranial soft tissue lesions. IMPRESSION: 
1. No evidence of acute intracranial abnormality. I saw the patient personally, took a history and did a complete physical exam at the bedside. I performed complex decision making in coming up with a diagnostic and treatment plan for the patient. I reviewed the patient's past medical records, current laboratory and radiology results, and actual Xray films/EKG. I have also discussed this case with the involved ED physician. Care Plan discussed with: 
  Patient, daughter, ED Doc, Dr Hedy Ricks Risk of deterioration:  High Total Time Coordinating Admission: 65    minutes   Total Critical Care Time:    
 
 
Paula Weldon MD

## 2018-09-29 NOTE — CONSULTS
[address] [address]  [phone] [address] [facility] Date of  Admission: 9/29/2018  6:33 AM  
 
Admission type:Emergency Consult for: 
Consult by: Subjective:  
 
Suzanne Vazquez . is a 80 y.o. male admitted for Acute encephalopathy. Patient had grand mal SZ and came to ER postictal.  Developed AF RVR in ER. Rate now ~ 100 after po dilt. Normal echo and neg myoview in 2014. NSR on old ekg's back to 2011 Patient Active Problem List  
 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  
 HTN (hypertension) 07/28/2014  Hyperlipidemia 07/28/2014 Sera Heart NP Past Medical History:  
Diagnosis Date  CAD (coronary artery disease)  Hypercholesteremia  Hypertension  Seizures ([address]   
 last seizure in 2011 per pt Social History Social History  Marital status:  Spouse name: N/A  
 Number of children: N/A  
 Years of education: N/A Social History Main Topics  Smoking status: Former Smoker  Smokeless tobacco: Never Used  Alcohol use No  
 Drug use: No  
 Sexual activity: Not on file Other Topics Concern  Not on file Social History Narrative No Known Allergies Family History Problem Relation Age of Onset  Alcohol abuse Mother Current Facility-Administered Medications Medication Dose Route Frequency  sodium chloride (NS) flush 5-10 mL  5-10 mL IntraVENous Q8H  
 sodium chloride (NS) flush 5-10 mL  5-10 mL IntraVENous PRN  
 acetaminophen (TYLENOL) tablet 650 mg  650 mg Oral Q6H PRN  
  naloxone (NARCAN) injection 0.4 mg  0.4 mg IntraVENous PRN  
 ondansetron (ZOFRAN) injection 4 mg  4 mg IntraVENous Q4H PRN  
 bisacodyl (DULCOLAX) suppository 10 mg  10 mg Rectal DAILY PRN  
 0.9% sodium chloride infusion  75 mL/hr IntraVENous CONTINUOUS  
 [START ON 9/30/2018] cefTRIAXone (ROCEPHIN) 1 g in 0.9% sodium chloride (MBP/ADV) 50 mL  1 g IntraVENous ONCE  
 galantamine (RAZADYNE) tablet 8 mg  8 mg Oral BID WITH MEALS  
 aspirin chewable tablet 81 mg  81 mg Oral QHS  carvedilol (COREG) tablet 3.125 mg  3.125 mg Oral BID WITH MEALS  
 atorvastatin (LIPITOR) tablet 10 mg  10 mg Oral QHS  heparin (porcine) injection 5,000 Units  5,000 Units SubCUTAneous Q12H  levETIRAcetam (KEPPRA) tablet 1,500 mg  1,500 mg Oral BID  dilTIAZem (CARDIZEM) IR tablet 30 mg  30 mg Oral AC&HS Review of Symptoms:  
NA; postictal 
 
  
 Objective:  
  
Visit Vitals  /73 (BP 1 Location: Left arm, BP Patient Position: At rest)  Pulse (!) 52  Temp 98.6 °F (37 °C)  Resp 20  
 Ht 5' 8\" (1.727 m)  Wt 128 lb (58.1 kg)  SpO2 95%  BMI 19.46 kg/m2 Physical:  
General:  
Heart: RRR, no m/S3/JVD, no carotid bruits Lungs: clear Abdomen: Soft, +BS, NTND Extremities: LE giorgi +DP/PT, no edema Neurologic: obtunded. Data Review:  
Recent Labs  
   09/29/18 
 6694 WBC  9.3 HGB  12.7 HCT  37.4 PLT  189 Recent Labs  
   09/29/18 
 4200 NA  136  
K  4.4  
CL  101 CO2  30 GLU  118* BUN  17 CREA  1.04  
CA  9.3 ALB  4.0 TBILI  0.4 SGOT  14* ALT  20 No results for input(s): TROIQ, CPK, CKMB in the last 72 hours. Intake/Output Summary (Last 24 hours) at 09/29/18 1421 Last data filed at 09/29/18 1322 Gross per 24 hour Intake                0 ml Output              125 ml Net             -125 ml  
  
 
Cardiographics Telemetry:  
ECG:  
Echocardiogram:  
CXRAY: 
 
 
 Assessment:  
  
 Active Problems: Acute encephalopathy (9/29/2018) Atrial fibrillation with RVR ([address] (9/29/2018) Plan:  
 
Agree with po dilt to control rate. Grand mal SZ strong relative contraindication to [facility]. Will repeat echo.  
 
Gi Nava MD

## 2018-09-29 NOTE — ED PROVIDER NOTES
EMERGENCY DEPARTMENT HISTORY AND PHYSICAL EXAM 
 
 
Date: 9/29/2018 Patient Name: Rod Finley Sr. 
 
History of Presenting Illness Chief Complaint Patient presents with  Seizure History Provided By: Patient's Wife, Patient's Daughter and EMS 
 
HPI: Rod Finley Sr., 80 y.o. male with PMHx significant for seizures CAD, hypercholesteremia, and HTN, presents by EMS to the ED for evaluation after a seizure which occurred earlier this morning. Per the pt's wife, she witnessed the pt seizing in his home for approximately 5 minutes earlier this morning. She notes that the pt has a Hx of seizures for which he currently takes Keppra. HPI and ROS limited secondary to the pt's mental status. PCP: Kade Vasques NP Current Outpatient Prescriptions Medication Sig Dispense Refill  galantamine (RAZADYNE) 8 mg SR capsule take 1 capsule by mouth daily before BREAKFAST 90 Cap 1  
 levETIRAcetam (KEPPRA) 750 mg tablet take 2 tablets by mouth twice a day 360 Tab 5  cyanocobalamin (VITAMIN B-12) 2,500 mcg sublingual tablet Take 2,500 mcg by mouth daily.  ASCORBATE CALCIUM (VITAMIN C PO) Take  by mouth.  Calcium-Cholecalciferol, D3, 600 mg(1,500mg) -400 unit chew Take  by mouth.  carvedilol (COREG) 3.125 mg tablet Take 1 Tab by mouth two (2) times daily (with meals). 60 Tab 0  
 aspirin 81 mg chewable tablet Take 81 mg by mouth nightly.  rosuvastatin (CRESTOR) 5 mg tablet Take 5 mg by mouth daily.  amlodipine (NORVASC) 5 mg tablet Take 5 mg by mouth daily. Past History Past Medical History: 
Past Medical History:  
Diagnosis Date  CAD (coronary artery disease)  Hypercholesteremia  Hypertension  Seizures ([address]   
 last seizure in 2011 per pt Past Surgical History: 
Past Surgical History:  
Procedure Laterality Date  CARDIAC SURG PROCEDURE UNLIST    
 cardiac stents [address]  HX CHOLECYSTECTOMY  2007  OH EGD BALLOON DILATION ESOPHAGUS <30 MM DIAM  11/21/2012 Family History: 
Family History Problem Relation Age of Onset  Alcohol abuse Mother Social History: 
Social History Substance Use Topics  Smoking status: Former Smoker  Smokeless tobacco: Never Used  Alcohol use No  
 
 
Allergies: 
No Known Allergies Review of Systems Review of Systems Unable to perform ROS: Mental status change Physical Exam  
Physical Exam  
Constitutional: He appears well-developed and well-nourished. No distress. HENT:  
Head: Normocephalic and atraumatic. Mouth/Throat: Oropharynx is clear and moist. No oropharyngeal exudate. Eyes: Conjunctivae and EOM are normal. Pupils are equal, round, and reactive to light. Right eye exhibits no discharge. Left eye exhibits no discharge. Neck: Normal range of motion. Neck supple. Cardiovascular: Regular rhythm and intact distal pulses. Tachycardia present. Exam reveals no gallop and no friction rub. Murmur heard. Systolic (Ejection) murmur is present with a grade of 3/6 Pulmonary/Chest: Effort normal and breath sounds normal. No respiratory distress. He has no wheezes. He has no rales. He exhibits no tenderness. Abdominal: Soft. Bowel sounds are normal. He exhibits no distension and no mass. There is no tenderness. There is no rebound and no guarding. Musculoskeletal: Normal range of motion. He exhibits no edema. Lymphadenopathy:  
  He has no cervical adenopathy. Neurological: He is alert. No cranial nerve deficit. Coordination normal.  
A & O x 1 Skin: Skin is warm and dry. No rash noted. No erythema. Psychiatric: He has a normal mood and affect. Nursing note and vitals reviewed. Diagnostic Study Results Labs - Recent Results (from the past 12 hour(s)) GLUCOSE, POC Collection Time: 09/29/18  6:39 AM  
Result Value Ref Range Glucose (POC) 149 (H) 65 - 100 mg/dL  Performed by Luz Flannery   
 CBC WITH AUTOMATED DIFF Collection Time: 09/29/18  7:09 AM  
Result Value Ref Range WBC 9.3 4.1 - 11.1 K/uL  
 RBC 3.79 (L) 4.10 - 5.70 M/uL  
 HGB 12.7 12.1 - 17.0 g/dL HCT 37.4 36.6 - 50.3 % MCV 98.7 80.0 - 99.0 FL  
 MCH 33.5 26.0 - 34.0 PG  
 MCHC 34.0 30.0 - 36.5 g/dL  
 RDW 12.5 11.5 - 14.5 % PLATELET 608 264 - 830 K/uL MPV 10.6 8.9 - 12.9 FL  
 NRBC 0.0 0  WBC ABSOLUTE NRBC 0.00 0.00 - 0.01 K/uL NEUTROPHILS 85 (H) 32 - 75 % LYMPHOCYTES 10 (L) 12 - 49 % MONOCYTES 5 5 - 13 % EOSINOPHILS 0 0 - 7 % BASOPHILS 0 0 - 1 % IMMATURE GRANULOCYTES 0 0.0 - 0.5 % ABS. NEUTROPHILS 7.8 1.8 - 8.0 K/UL  
 ABS. LYMPHOCYTES 0.9 0.8 - 3.5 K/UL  
 ABS. MONOCYTES 0.5 0.0 - 1.0 K/UL  
 ABS. EOSINOPHILS 0.0 0.0 - 0.4 K/UL  
 ABS. BASOPHILS 0.0 0.0 - 0.1 K/UL  
 ABS. IMM. GRANS. 0.0 0.00 - 0.04 K/UL  
 DF AUTOMATED METABOLIC PANEL, COMPREHENSIVE Collection Time: 09/29/18  7:09 AM  
Result Value Ref Range Sodium 136 136 - 145 mmol/L Potassium 4.4 3.5 - 5.1 mmol/L Chloride 101 97 - 108 mmol/L  
 CO2 30 21 - 32 mmol/L Anion gap 5 5 - 15 mmol/L Glucose 118 (H) 65 - 100 mg/dL BUN 17 6 - 20 MG/DL Creatinine 1.04 0.70 - 1.30 MG/DL  
 BUN/Creatinine ratio 16 12 - 20 GFR est AA >60 >60 ml/min/1.73m2 GFR est non-AA >60 >60 ml/min/1.73m2 Calcium 9.3 8.5 - 10.1 MG/DL Bilirubin, total 0.4 0.2 - 1.0 MG/DL  
 ALT (SGPT) 20 12 - 78 U/L  
 AST (SGOT) 14 (L) 15 - 37 U/L Alk. phosphatase 98 45 - 117 U/L Protein, total 8.2 6.4 - 8.2 g/dL Albumin 4.0 3.5 - 5.0 g/dL Globulin 4.2 (H) 2.0 - 4.0 g/dL A-G Ratio 1.0 (L) 1.1 - 2.2 SAMPLES BEING HELD Collection Time: 09/29/18  7:09 AM  
Result Value Ref Range SAMPLES BEING HELD 1 RED 1 LT BLUE 1 GREY   
 COMMENT Add-on orders for these samples will be processed based on acceptable specimen integrity and analyte stability, which may vary by analyte.   
URINALYSIS W/MICROSCOPIC  
 Collection Time: 09/29/18  8:14 AM  
Result Value Ref Range Color YELLOW/STRAW Appearance CLEAR CLEAR Specific gravity 1.014 1.003 - 1.030    
 pH (UA) 8.0 5.0 - 8.0 Protein 30 (A) NEG mg/dL Glucose NEGATIVE  NEG mg/dL Ketone NEGATIVE  NEG mg/dL Bilirubin NEGATIVE  NEG Blood NEGATIVE  NEG Urobilinogen 0.2 0.2 - 1.0 EU/dL Nitrites NEGATIVE  NEG Leukocyte Esterase TRACE (A) NEG    
 WBC 10-20 0 - 4 /hpf  
 RBC 5-10 0 - 5 /hpf Epithelial cells FEW FEW /lpf Bacteria 1+ (A) NEG /hpf Medical Decision Making I am the first provider for this patient. I reviewed the vital signs, available nursing notes, past medical history, past surgical history, family history and social history. Vital Signs-Reviewed the patient's vital signs. Patient Vitals for the past 12 hrs: 
 Temp Pulse Resp BP SpO2  
09/29/18 0913 - (!) 113 16 150/64 95 % 09/29/18 0705 - 85 18 148/61 96 %  
09/29/18 0645 - 88 - 161/63 95 % 09/29/18 0640 97.9 °F (36.6 °C) 89 15 160/51 95 % Pulse Oximetry Analysis - 95% on RA Cardiac Monitor:  
Rate: 101 bpm 
Rhythm: Sinus tachycardia EKG interpretation: (Preliminary) 6:48 Rhythm: sinus rhythm with sinus arrhythmia with 1st degree AV block; and irregular. Rate (approx.): 85 bpm; Axis: normal; VA interval: normal; QRS interval: normal ; ST/T wave: normal. 
Written by TRAN Ivanibmelnida, as dictated by Gap IncCharis Benjamin MD. Records Reviewed: Nursing Notes, Old Medical Records, Previous electrocardiograms, Ambulance Run Sheet and Previous Laboratory Studies Provider Notes (Medical Decision Making): DDx: UTI, dehydration, electrolyte abnormality, threshold lowering medication side effect, subtherapeutic AED 
 
ED Course:  
Initial assessment performed.  The patients presenting problems have been discussed, and they are in agreement with the care plan formulated and outlined with them. I have encouraged them to ask questions as they arise throughout their visit. 7:36 AM 
I have just reevaluated the patient. I have reviewed His vital signs and determined there is currently no worsening in their condition or physical exam.  Results have been reviewed with them and their questions have been answered. We will continue to review further results as they come available. 8:27 AM 
I have just reevaluated the patient. I have reviewed His vital signs and determined there is currently no worsening in their condition or physical exam.  Results have been reviewed with them and their questions have been answered. We will continue to review further results as they come available. PROGRESS NOTE: 
8:57 AM 
Pt is having some transient elevations in his heart rate, and he is also becoming increasingly altered. Will admit to Hospitalist. 
Written by Alfredo Real, TRAN Scribe, as dictated by Liz Wallis. Kameron Hamilton MD.  
 
CONSULT NOTE:  
10:11 AM 
Liz Wallis. Kameron Hamilton MD spoke with Minoo Saunders MD  
Specialty: Hospitalist 
Discussed pt's hx, disposition, and available diagnostic and imaging results. Reviewed care plans. Consultant will evaluate pt for admission. Written by Alfredo Real ED Scribe, as dictated by Shaun Hamilton MD. Critical Care Time:  
0 Disposition: PLAN: 
1. Admit to Hospitalist 
 
ADMIT NOTE: 
10:11 AM 
Patient is being admitted to the hospital by Dr. Ap Camacho. The results of their tests and reasons for their admission have been discussed with them and/or available family. They convey agreement and understanding for the need to be admitted and for their admission diagnosis. Consultation has been made with the inpatient physician specialist for hospitalization. Diagnosis Clinical Impression: 1. Acute cystitis without hematuria Attestations: This note is prepared by Felicia Arevalo, acting as Scribe for Liz Wallis.  Kameron Hamilton MD. 
 
 George Beach MD: The scribe's documentation has been prepared under my direction and personally reviewed by me in its entirety. I confirm that the note above accurately reflects all work, treatment, procedures, and medical decision making performed by me.

## 2018-09-29 NOTE — ED TRIAGE NOTES
Assumed care of patient. Call bell within reach  Family bedside. Pt alert and oriented x2, vs stable

## 2018-09-29 NOTE — ED TRIAGE NOTES
Pt presents to ED post ictal. Family found pt at home seizing per EMS report. Duration reported to EMS by family was 5 mins. Family en route to ED. Pt was not seizing when EMS arrived. Pt AOx2. Pt calm and cooperative. Report states that pt takes keppra for seizures. RN will f/u with pts family. Pt placed on monitors x 3.  mg/dL. Bed locked and in low position, side rails up x 2. Seizure precautions in place. Call bell within reach.

## 2018-09-29 NOTE — IP AVS SNAPSHOT
[address] [facility]. 
[phone] Patient: Carlos Sharma Sr. MRN: [id number] :[id number] About your hospitalization You were admitted on:  2018 You last received care in the:  hospitals 3 NEUROSCIENCE TELEMETRY You were discharged on:  2018 Why you were hospitalized Your primary diagnosis was:  Not on File Your diagnoses also included:  Acute Encephalopathy, Atrial Fibrillation With Rvr (Hcc) Follow-up Information Follow up With Details Comments Contact Info Lawana Brittle, NP Schedule an appointment as soon as possible for a visit in 1 week Practice asks for patient to call to schedule follow up appointment [address] 
[phone] Discharge Orders None A check martha indicates which time of day the medication should be taken. My Medications START taking these medications Instructions Each Dose to Equal  
 Morning Noon Evening Bedtime  
 amoxicillin-clavulanate 875-125 mg per tablet Commonly known as:  AUGMENTIN Your last dose was: Your next dose is: Take 1 Tab by mouth every twelve (12) hours for 5 days. 1 Tab  
    
   
   
   
  
 dilTIAZem  mg ER capsule Commonly known as:  CARDIZEM CD Start taking on:  10/2/2018 Your last dose was: Your next dose is: Take 1 Cap by mouth daily for 30 days. 180 mg CONTINUE taking these medications Instructions Each Dose to Equal  
 Morning Noon Evening Bedtime  
 aspirin 81 mg chewable tablet Your last dose was: Your next dose is: Take 81 mg by mouth nightly. 81 mg Calcium-Cholecalciferol (D3) 600 mg(1,500mg) -400 unit Chew Your last dose was: Your next dose is: Take  by mouth. carvedilol 3.125 mg tablet Commonly known as:  Reji Parekh Your last dose was: Your next dose is: Take 1 Tab by mouth two (2) times daily (with meals). 3.125 mg  
    
   
   
   
  
 CRESTOR 5 mg tablet Generic drug:  rosuvastatin Your last dose was: Your next dose is: Take 5 mg by mouth daily. 5 mg  
    
   
   
   
  
 galantamine 8 mg SR capsule Commonly known as:  Margurite Hind Your last dose was: Your next dose is:    
   
   
 take 1 capsule by mouth daily before BREAKFAST  
     
   
   
   
  
 levETIRAcetam 750 mg tablet Commonly known as:  KEPPRA Your last dose was: Your next dose is:    
   
   
 take 2 tablets by mouth twice a day VITAMIN B-12 2,500 mcg sublingual tablet Generic drug:  cyanocobalamin Your last dose was: Your next dose is: Take 2,500 mcg by mouth daily. 2500 mcg VITAMIN C PO Your last dose was: Your next dose is: Take  by mouth. STOP taking these medications NORVASC 5 mg tablet Generic drug:  amLODIPine Where to Get Your Medications Information on where to get these meds will be given to you by the nurse or doctor. ! Ask your nurse or doctor about these medications  
  amoxicillin-clavulanate 875-125 mg per tablet  
 dilTIAZem  mg ER capsule Discharge Instructions Urinary Tract Infections in Men: Care Instructions Your Care Instructions A urinary tract infection, or UTI, is a general term for an infection anywhere between the kidneys and the tip of the penis. UTIs can also be a result of a prostate problem. Most cause pain or burning when you urinate. Most UTIs are caused by bacteria and can be cured with antibiotics.  It is important to complete your treatment so that the infection does not get worse. 
Follow-up care is a key part of your treatment and safety. Be sure to make and go to all appointments, and call your doctor if you are having problems. It's also a good idea to know your test results and keep a list of the medicines you take. How can you care for yourself at home? · Take your antibiotics as prescribed. Do not stop taking them just because you feel better. You need to take the full course of antibiotics. · Take your medicines exactly as prescribed. Your doctor may have prescribed a medicine, such as phenazopyridine (Pyridium), to help relieve pain when you urinate. This turns your urine orange. You may stop taking it when your symptoms get better. But be sure to take all of your antibiotics, which treat the infection. · Drink extra water for the next day or two. This will help make the urine less concentrated and help wash out the bacteria causing the infection. (If you have kidney, heart, or liver disease and have to limit your fluids, talk with your doctor before you increase your fluid intake.) · Avoid drinks that are carbonated or have caffeine. They can irritate the bladder. · Urinate often. Try to empty your bladder each time. · To relieve pain, take a hot bath or lay a heating pad (set on low) over your lower belly or genital area. Never go to sleep with a heating pad in place. To help prevent UTIs · Drink plenty of fluids, enough so that your urine is light yellow or clear like water. If you have kidney, heart, or liver disease and have to limit fluids, talk with your doctor before you increase the amount of fluids you drink. · Urinate when you have the urge. Do not hold your urine for a long time. Urinate before you go to sleep. · Keep your penis clean. Catheter care If you have a drainage tube (catheter) in place, the following steps will help you care for it. · Always wash your hands before and after touching your catheter. · Check the area around the urethra for inflammation or signs of infection. Signs of infection include irritated, swollen, red, or tender skin, or pus around the catheter. · Clean the area around the catheter with soap and water two times a day. Dry with a clean towel afterward. · Do not apply powder or lotion to the skin around the catheter. To empty the urine collection bag · Wash your hands with soap and water. · Without touching the drain spout, remove the spout from its sleeve at the bottom of the collection bag. Open the valve on the spout. · Let the urine flow out of the bag and into the toilet or a container. Do not let the tubing or drain spout touch anything. · After you empty the bag, clean the end of the drain spout with tissue and water. Close the valve and put the drain spout back into its sleeve at the bottom of the collection bag. · Wash your hands with soap and water. When should you call for help? Call your doctor now or seek immediate medical care if: 
  · Symptoms such as a fever, chills, nausea, or vomiting get worse or happen for the first time.  
  · You have new pain in your back just below your rib cage. This is called flank pain.  
  · There is new blood or pus in your urine.  
  · You are not able to take or keep down your antibiotics.  
 Watch closely for changes in your health, and be sure to contact your doctor if: 
  · You are not getting better after taking an antibiotic for 2 days.  
  · Your symptoms go away but then come back. Where can you learn more? Go to http://svetlana-brooke.info/. Enter L748 in the search box to learn more about \"Urinary Tract Infections in Men: Care Instructions. \" Current as of: May 12, 2017 Content Version: 11.7 © 3631-4788 [employer].  Care instructions adapted under license by [employer] (which disclaims liability or warranty for this information). If you have questions about a medical condition or this instruction, always ask your healthcare professional. [place] [address] any warranty or liability for your use of this information. Introducing \[facility]\"" HEALTH SERVICES! [facility] introduces PEPperPRINT patient portal. Now you can access parts of your medical record, email your doctor's office, and request medication refills online. 1. In your internet browser, go to [email or web address]/[email or web address] 2. Click on the First Time User? Click Here link in the Sign In box. You will see the New Member Sign Up page. 3. Enter your PEPperPRINT Access Code exactly as it appears below. You will not need to use this code after youve completed the sign-up process. If you do not sign up before the expiration date, you must request a new code. · PEPperPRINT Access Code: [email or web address] Expires: 12/28/2018  6:41 AM 
 
4. Enter the last four digits of your Social Security Number (xxxx) and Date of Birth (mm/dd/yyyy) as indicated and click Submit. You will be taken to the next sign-up page. 5. Create a PEPperPRINT ID. This will be your PEPperPRINT login ID and cannot be changed, so think of one that is secure and easy to remember. 6. Create a PEPperPRINT password. You can change your password at any time. 7. Enter your Password Reset Question and Answer. This can be used at a later time if you forget your password. 8. Enter your e-mail address. You will receive e-mail notification when new information is available in [address] 9. Click Sign Up. You can now view and download portions of your medical record. 10. Click the Download Summary menu link to download a portable copy of your medical information. If you have questions, please visit the Frequently Asked Questions section of the PEPperPRINT website. Remember, PEPperPRINT is NOT to be used for urgent needs. For medical emergencies, dial 911. Now available from your iPhone and Android! Introducing Lexa Mora As a [address] [place] [address] patient, I wanted to make you aware of our electronic visit tool called Lexa Mora. [facility] [address] 24/7 allows you to connect within minutes with a medical provider 24 hours a day, seven days a week via a mobile device or tablet or logging into a secure website from your computer. You can access Lexa Mora from anywhere in the [place]. A virtual visit might be right for you when you have a simple condition and feel like you just dont want to get out of bed, or cant get away from work for an appointment, when your regular [facility] provider is not available (evenings, weekends or holidays), or when youre out of town and need minor care. Electronic visits cost only $49 and if the "[employer]" [facility] 24/7 provider determines a prescription is needed to treat your condition, one can be electronically transmitted to a nearby pharmacy*. Please take a moment to enroll today if you have not already done so. The enrollment process is free and takes just a few minutes. To enroll, please download the [employer] 24/7 harsha to your tablet or phone, or visit [email or web address] to enroll on your computer. And, as an [address] [place] [address] patient with a Buy buy tea account, the results of your visits will be scanned into your electronic medical record and your primary care provider will be able to view the scanned results. We urge you to continue to see your regular [facility] provider for your ongoing medical care. And while your primary care provider may not be the one available when you seek a Lexa Mora virtual visit, the peace of mind you get from getting a real diagnosis real time can be priceless. For more information on Lexa Mora, view our Frequently Asked Questions (FAQs) at [email or web address]. Sincerely, 
 
Jo Metzger MD 
Chief Medical Officer AntwanBrie Dior *:  certain medications cannot be prescribed via Lexa Mora Providers Seen During Your Hospitalization Provider Specialty Primary office phone Afshin Sanon MD Emergency Medicine [phone] Sandra Goodman MD Internal Medicine [phone] Immunizations Administered for This Admission Name Date Influenza Vaccine (Quad) PF 10/1/2018 Your Primary Care Physician (PCP) Primary Care Physician Office Phone Office Fax Abhilash Acharya [phone] You are allergic to the following No active allergies Recent Documentation Height Weight BMI Smoking Status 1.727 m 58.1 kg 19.46 kg/m2 Former Smoker Emergency Contacts Name Discharge Info Relation Home Work Mobile Lona Clipper CAREGIVER [3] Child [2] [phone] Patient Belongings The following personal items are in your possession at time of discharge: 
  Dental Appliances: At home  Visual Aid: Glasses, With patient      Home Medications: None   Jewelry: None  Clothing: Undergarments, Pants, Shirt, Socks, Hat, Jacket/Coat, Footwear    Other Valuables: Eyeglasses Please provide this summary of care documentation to your next provider. Signatures-by signing, you are acknowledging that this After Visit Summary has been reviewed with you and you have received a copy. Patient Signature:  ____________________________________________________________ Date:  ____________________________________________________________  
  
Sánchez Stewart Provider Signature:  ____________________________________________________________ Date:  ____________________________________________________________

## 2018-09-29 NOTE — ED NOTES
Bedside and Verbal shift change report given to [facility] (oncoming nurse) by Agnes Farris RN (offgoing nurse). Report included the following information SBAR, Kardex, ED Summary, [facility] and Recent Results.
- patient was using a CPAP machine prior to surgery in 2/2017 but has not used it since.  Patient is afraid to use it given his persistent nausea/vomiting.  Has been doing well without one as per patient  - hold off CPAP for now to lessen patient's anxiety

## 2018-09-29 NOTE — Clinical Note
Thank you for allowing us to provide you with excellent care today. We hope we addressed all of your concerns and needs. We strive to provide excellent quality care in the Emergency Department. Please rate us as excellent, as anything less than exce llent does not meet our expectations. If you feel that you have not received excellent quality care or timely care, please ask to speak to the nurse manager. Please choose us in the future for your continued health care needs. The exam and treatm ent you received in the Emergency Department were for an urgent problem and are not intended as complete care. It is important that you follow-up with a doctor, nurse practitioner, or physician assistant to:  (1) confirm your diagnosis,  (2) re-evaluatio n of changes in your illness and treatment, and  (3) for ongoing care. If your symptoms become worse or you do not improve as expected and you are unable to reach your usual health care provider, you should return to the Emergency Department. We are zoie ilable 24 hours a day. Take this sheet with you when you go to your follow-up visit. If you have any problem arranging the follow-up visit, contact [address] [place] [address] 21 [phone]) Make an appointment with your Primary Care doctor for follow up of this visit. Re turn to the ER if you are unable to be seen in the time recommended on your discharge instructions.

## 2018-09-29 NOTE — PROGRESS NOTES
TRANSFER - IN REPORT: 
 
Verbal report received from Pasha(name) on [facility]  being received from ED(unit) for routine progression of care Report consisted of patients Situation, Background, Assessment and  
Recommendations(SBAR). Information from the following report(s) SBAR, Intake/Output, MAR, Accordion and Recent Results was reviewed with the receiving nurse. Opportunity for questions and clarification was provided. Assessment completed upon patients arrival to unit and care assumed.

## 2018-09-30 LAB
ALBUMIN SERPL-MCNC: 3.2 G/DL (ref 3.5–5)
ALBUMIN/GLOB SERPL: 0.9 {RATIO} (ref 1.1–2.2)
ALP SERPL-CCNC: 78 U/L (ref 45–117)
ALT SERPL-CCNC: 14 U/L (ref 12–78)
ANION GAP SERPL CALC-SCNC: 7 MMOL/L (ref 5–15)
AST SERPL-CCNC: 12 U/L (ref 15–37)
ATRIAL RATE: 147 BPM
ATRIAL RATE: 79 BPM
BASOPHILS # BLD: 0 K/UL (ref 0–0.1)
BASOPHILS NFR BLD: 1 % (ref 0–1)
BILIRUB SERPL-MCNC: 0.5 MG/DL (ref 0.2–1)
BUN SERPL-MCNC: 18 MG/DL (ref 6–20)
BUN/CREAT SERPL: 20 (ref 12–20)
CALCIUM SERPL-MCNC: 8.2 MG/DL (ref 8.5–10.1)
CALCULATED P AXIS, ECG09: 39 DEGREES
CALCULATED R AXIS, ECG10: -11 DEGREES
CALCULATED R AXIS, ECG10: -12 DEGREES
CALCULATED T AXIS, ECG11: 104 DEGREES
CALCULATED T AXIS, ECG11: 56 DEGREES
CHLORIDE SERPL-SCNC: 106 MMOL/L (ref 97–108)
CO2 SERPL-SCNC: 26 MMOL/L (ref 21–32)
CREAT SERPL-MCNC: 0.89 MG/DL (ref 0.7–1.3)
DIAGNOSIS, 93000: NORMAL
DIAGNOSIS, 93000: NORMAL
DIFFERENTIAL METHOD BLD: ABNORMAL
EOSINOPHIL # BLD: 0 K/UL (ref 0–0.4)
EOSINOPHIL NFR BLD: 0 % (ref 0–7)
ERYTHROCYTE [DISTWIDTH] IN BLOOD BY AUTOMATED COUNT: 12.5 % (ref 11.5–14.5)
GLOBULIN SER CALC-MCNC: 3.5 G/DL (ref 2–4)
GLUCOSE SERPL-MCNC: 103 MG/DL (ref 65–100)
HCT VFR BLD AUTO: 30.7 % (ref 36.6–50.3)
HGB BLD-MCNC: 10.5 G/DL (ref 12.1–17)
IMM GRANULOCYTES # BLD: 0 K/UL (ref 0–0.04)
IMM GRANULOCYTES NFR BLD AUTO: 1 % (ref 0–0.5)
LYMPHOCYTES # BLD: 1.6 K/UL (ref 0.8–3.5)
LYMPHOCYTES NFR BLD: 18 % (ref 12–49)
MCH RBC QN AUTO: 33.3 PG (ref 26–34)
MCHC RBC AUTO-ENTMCNC: 34.2 G/DL (ref 30–36.5)
MCV RBC AUTO: 97.5 FL (ref 80–99)
MONOCYTES # BLD: 1 K/UL (ref 0–1)
MONOCYTES NFR BLD: 11 % (ref 5–13)
NEUTS SEG # BLD: 6 K/UL (ref 1.8–8)
NEUTS SEG NFR BLD: 69 % (ref 32–75)
NRBC # BLD: 0 K/UL (ref 0–0.01)
NRBC BLD-RTO: 0 PER 100 WBC
P-R INTERVAL, ECG05: 262 MS
PLATELET # BLD AUTO: 164 K/UL (ref 150–400)
PMV BLD AUTO: 10.7 FL (ref 8.9–12.9)
POTASSIUM SERPL-SCNC: 3.7 MMOL/L (ref 3.5–5.1)
PROT SERPL-MCNC: 6.7 G/DL (ref 6.4–8.2)
Q-T INTERVAL, ECG07: 280 MS
Q-T INTERVAL, ECG07: 398 MS
QRS DURATION, ECG06: 84 MS
QRS DURATION, ECG06: 90 MS
QTC CALCULATION (BEZET), ECG08: 393 MS
QTC CALCULATION (BEZET), ECG08: 456 MS
RBC # BLD AUTO: 3.15 M/UL (ref 4.1–5.7)
SODIUM SERPL-SCNC: 139 MMOL/L (ref 136–145)
TSH SERPL DL<=0.05 MIU/L-ACNC: 2.65 UIU/ML (ref 0.36–3.74)
VENTRICULAR RATE, ECG03: 119 BPM
VENTRICULAR RATE, ECG03: 79 BPM
WBC # BLD AUTO: 8.6 K/UL (ref 4.1–11.1)

## 2018-09-30 PROCEDURE — 74011250636 HC RX REV CODE- 250/636: Performed by: EMERGENCY MEDICINE

## 2018-09-30 PROCEDURE — 65660000000 HC RM CCU STEPDOWN

## 2018-09-30 PROCEDURE — 84443 ASSAY THYROID STIM HORMONE: CPT | Performed by: INTERNAL MEDICINE

## 2018-09-30 PROCEDURE — 36415 COLL VENOUS BLD VENIPUNCTURE: CPT | Performed by: INTERNAL MEDICINE

## 2018-09-30 PROCEDURE — 93005 ELECTROCARDIOGRAM TRACING: CPT

## 2018-09-30 PROCEDURE — 74011250637 HC RX REV CODE- 250/637: Performed by: INTERNAL MEDICINE

## 2018-09-30 PROCEDURE — 97165 OT EVAL LOW COMPLEX 30 MIN: CPT

## 2018-09-30 PROCEDURE — 74011000258 HC RX REV CODE- 258: Performed by: INTERNAL MEDICINE

## 2018-09-30 PROCEDURE — 80053 COMPREHEN METABOLIC PANEL: CPT | Performed by: INTERNAL MEDICINE

## 2018-09-30 PROCEDURE — G8980 MOBILITY D/C STATUS: HCPCS

## 2018-09-30 PROCEDURE — G8978 MOBILITY CURRENT STATUS: HCPCS

## 2018-09-30 PROCEDURE — 74011250636 HC RX REV CODE- 250/636: Performed by: INTERNAL MEDICINE

## 2018-09-30 PROCEDURE — 97530 THERAPEUTIC ACTIVITIES: CPT

## 2018-09-30 PROCEDURE — 85025 COMPLETE CBC W/AUTO DIFF WBC: CPT | Performed by: INTERNAL MEDICINE

## 2018-09-30 PROCEDURE — 97161 PT EVAL LOW COMPLEX 20 MIN: CPT

## 2018-09-30 PROCEDURE — G8979 MOBILITY GOAL STATUS: HCPCS

## 2018-09-30 RX ORDER — DILTIAZEM HYDROCHLORIDE 120 MG/1
120 CAPSULE, COATED, EXTENDED RELEASE ORAL DAILY
Status: DISCONTINUED | OUTPATIENT
Start: 2018-09-30 | End: 2018-10-01

## 2018-09-30 RX ADMIN — GALANTAMINE 8 MG: 4 TABLET, FILM COATED ORAL at 17:02

## 2018-09-30 RX ADMIN — HEPARIN SODIUM 5000 UNITS: 5000 INJECTION INTRAVENOUS; SUBCUTANEOUS at 21:17

## 2018-09-30 RX ADMIN — DILTIAZEM HYDROCHLORIDE 30 MG: 30 TABLET, FILM COATED ORAL at 08:36

## 2018-09-30 RX ADMIN — DILTIAZEM HYDROCHLORIDE 120 MG: 120 CAPSULE, COATED, EXTENDED RELEASE ORAL at 17:03

## 2018-09-30 RX ADMIN — SODIUM CHLORIDE 75 ML/HR: 900 INJECTION, SOLUTION INTRAVENOUS at 13:30

## 2018-09-30 RX ADMIN — LEVETIRACETAM 1500 MG: 500 TABLET, FILM COATED ORAL at 17:02

## 2018-09-30 RX ADMIN — CARVEDILOL 3.12 MG: 3.12 TABLET, FILM COATED ORAL at 08:36

## 2018-09-30 RX ADMIN — DILTIAZEM HYDROCHLORIDE 30 MG: 30 TABLET, FILM COATED ORAL at 10:58

## 2018-09-30 RX ADMIN — GALANTAMINE 8 MG: 4 TABLET, FILM COATED ORAL at 08:35

## 2018-09-30 RX ADMIN — ATORVASTATIN CALCIUM 10 MG: 10 TABLET, FILM COATED ORAL at 21:17

## 2018-09-30 RX ADMIN — ASPIRIN 81 MG 81 MG: 81 TABLET ORAL at 21:17

## 2018-09-30 RX ADMIN — CARVEDILOL 3.12 MG: 3.12 TABLET, FILM COATED ORAL at 17:03

## 2018-09-30 RX ADMIN — CEFTRIAXONE 1 G: 1 INJECTION, POWDER, FOR SOLUTION INTRAMUSCULAR; INTRAVENOUS at 08:35

## 2018-09-30 RX ADMIN — HEPARIN SODIUM 5000 UNITS: 5000 INJECTION INTRAVENOUS; SUBCUTANEOUS at 10:57

## 2018-09-30 RX ADMIN — SODIUM CHLORIDE 75 ML/HR: 900 INJECTION, SOLUTION INTRAVENOUS at 00:00

## 2018-09-30 RX ADMIN — LEVETIRACETAM 1500 MG: 500 TABLET, FILM COATED ORAL at 08:36

## 2018-09-30 NOTE — PROGRESS NOTES
[address]  [phone] Cardiology Progress Note 9/30/2018 2:46 PM 
 
Admit Date: 9/29/2018 Admit Diagnosis:  
Acute encephalopathy Subjective:  
 
Lavinia Grace Sr. No complaints Visit Vitals  /65 (BP 1 Location: Right arm, BP Patient Position: At rest)  Pulse 62  Temp 98 °F (36.7 °C)  Resp 18  Ht 5' 8\" (1.727 m)  Wt 128 lb (58.1 kg)  SpO2 97%  BMI 19.46 kg/m2 Current Facility-Administered Medications Medication Dose Route Frequency  sodium chloride (NS) flush 5-10 mL  5-10 mL IntraVENous Q8H  
 sodium chloride (NS) flush 5-10 mL  5-10 mL IntraVENous PRN  
 acetaminophen (TYLENOL) tablet 650 mg  650 mg Oral Q6H PRN  
 naloxone (NARCAN) injection 0.4 mg  0.4 mg IntraVENous PRN  
 ondansetron (ZOFRAN) injection 4 mg  4 mg IntraVENous Q4H PRN  
 bisacodyl (DULCOLAX) suppository 10 mg  10 mg Rectal DAILY PRN  
 0.9% sodium chloride infusion  75 mL/hr IntraVENous CONTINUOUS  
 galantamine (RAZADYNE) tablet 8 mg  8 mg Oral BID WITH MEALS  
 aspirin chewable tablet 81 mg  81 mg Oral QHS  carvedilol (COREG) tablet 3.125 mg  3.125 mg Oral BID WITH MEALS  
 atorvastatin (LIPITOR) tablet 10 mg  10 mg Oral QHS  heparin (porcine) injection 5,000 Units  5,000 Units SubCUTAneous Q12H  levETIRAcetam (KEPPRA) tablet 1,500 mg  1,500 mg Oral BID  dilTIAZem (CARDIZEM) IR tablet 30 mg  30 mg Oral AC&HS  influenza vaccine 2018-19 (6 mos+)(PF) (FLUARIX QUAD/FLULAVAL QUAD) injection 0.5 mL  0.5 mL IntraMUSCular PRIOR TO DISCHARGE Objective:  
  
Physical Exam: 
General Appearance:   
Chest:   Clear Cardiovascular:  Regular rate and rhythm, no murmur.  
Extremities: no edema Skin:  Warm and dry.  
 
Data Review:  
Recent Labs  
   09/30/18 
 0412  09/29/18 
 7513 WBC  8.6  9.3 HGB  10.5*  12.7 HCT  30.7*  37.4 PLT  164  189 Recent Labs  
   09/30/18 
 0412  09/29/18 
 1105 NA  139  136 K  3.7  4.4 CL  106  101 CO2  26  30 GLU  103*  118* BUN  18  17 CREA  0.89  1.04  
CA  8.2*  9.3 ALB  3.2*  4.0 TBILI  0.5  0.4 SGOT  12*  14* ALT  14  20 No results for input(s): TROIQ, CPK, CKMB in the last 72 hours. Intake/Output Summary (Last 24 hours) at 09/30/18 1446 Last data filed at 09/30/18 1335 Gross per 24 hour Intake           2227.5 ml Output              850 ml Net           1377.5 ml Telemetry:  
EKG: 
Cxray: 
 
Assessment:  
 
Active Problems: 
  Acute encephalopathy (9/29/2018) Atrial fibrillation with RVR ([address] (9/29/2018) Plan:  
 
Back in NSR. Change dilt to slow release Bowen Lay M.D., [facility]

## 2018-09-30 NOTE — PROGRESS NOTES
Occupational Therapy EVALUATION/discharge Patient: Sheldon Parekh Sr. (80 y.o. male) Date: 9/30/2018 Primary Diagnosis: Acute encephalopathy Precautions:   Fall ASSESSMENT:  
Based on the objective data described below, the patient presents at baseline for ADL tasks with admission for possible seizure and UTI. Nursing cleared for therapy. He lives with daughter and son in law and reports supervision-mod indep without AD and likes to stay active. He rides stationary bike and goes to grocery store with dtr. He is [place] with deaf in L ear and about 5% vision in R eye. Demo ability to complete sock mgmt doff/don with supervision EOB, toileting tasks supervision and grooming standing at sink supervision. No LOB and denies dizziness. Recommend dc home with support from family. Further skilled acute occupational therapy is not indicated at this time. Discharge Recommendations: None Further Equipment Recommendations for Discharge: no needs for DME for ADL SUBJECTIVE:  
Patient stated I am pretty active at home.  OBJECTIVE DATA SUMMARY:  
HISTORY:  
Past Medical History:  
Diagnosis Date  CAD (coronary artery disease)  Hypercholesteremia  Hypertension  Seizures ([facility] [address]   
 last seizure in 2011 per pt Past Surgical History:  
Procedure Laterality Date  CARDIAC SURG PROCEDURE UNLIST    
 cardiac stents [address]  HX CHOLECYSTECTOMY  2007  [place] EGD BALLOON DILATION ESOPHAGUS <30 MM DIAM  11/21/2012 Prior Level of Function/Environment/Context: Home with dtr and son in law. Supervision-indep with ADL tasks. Does not use AD but owns cane and RW. Active with stationary bike and grocery shopping. Expanded or extensive additional review of patient history:  
 
Home Situation Home Environment: Private residence (double wide) # Steps to Enter: 4 Rails to Enter: Yes Hand Rails : Bilateral 
One/Two Story Residence: One story Living Alone: No 
 Support Systems: Child(eunice) Patient Expects to be Discharged to[de-identified] Private residence Current DME Used/Available at Home: Walker, rolling Tub or Shower Type: Tub/Shower combination Hand dominance: Right EXAMINATION OF PERFORMANCE DEFICITS: 
Cognitive/Behavioral Status: 
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   
    
    
   
Acuity:  (R eye \"5%\", L eye WDL) Corrective Lenses: Glasses Range of Motion: 
AROM: Within functional limits PROM: Within functional limits Strength: 
Strength: Within functional limits Coordination: 
Coordination: Within functional limits Fine Motor Skills-Upper: Left Intact; Right Intact Gross Motor Skills-Upper: Left Intact; Right Intact Tone & Sensation: 
Tone: Normal 
Sensation: Intact Balance: 
Sitting: Intact Standing: Impaired Standing - Static: Constant support;Good Standing - Dynamic : Good Functional Mobility and Transfers for ADLs: 
Bed Mobility: 
Rolling: Supervision Supine to Sit: Supervision Scooting: Supervision Transfers: 
Sit to Stand: Supervision Stand to Sit: Supervision Bathroom Mobility: Supervision/set up (RW) Toilet Transfer : Supervision (RW) ADL Assessment: 
Feeding: Independent Oral Facial Hygiene/Grooming: Supervision Bathing: Supervision Upper Body Dressing: Independent Lower Body Dressing: Supervision Toileting: Supervision ADL Intervention and task modifications: Toileting Bladder Hygiene: Supervision/set-up Functional Measure: 
Barthel Index: 
 
Bathin Bladder: 10 Bowels: 10 
Groomin Dressing: 10 Feeding: 10 Mobility: 10 Stairs: 5 Toilet Use: 10 Transfer (Bed to Chair and Back): 10 Total: 80 Barthel and G-code impairment scale: 
Percentage of impairment CH 
0% CI 
 1-19% CJ 
20-39% CK 
40-59% CL 
60-79% CM 
80-99% CN 
100% Barthel Score 0-100 100 99-80 79-60 59-40 20-39 1-19 
 0 Barthel Score 0-20 20 17-19 13-16 9-12 5-8 1-4 0 The Barthel ADL Index: Guidelines 1. The index should be used as a record of what a patient does, not as a record of what a patient could do. 2. The main aim is to establish degree of independence from any help, physical or verbal, however minor and for whatever reason. 3. The need for supervision renders the patient not independent. 4. A patient's performance should be established using the best available evidence. Asking the patient, friends/relatives and nurses are the usual sources, but direct observation and common sense are also important. However direct testing is not needed. 5. Usually the patient's performance over the preceding 24-48 hours is important, but occasionally longer periods will be relevant. 6. Middle categories imply that the patient supplies over 50 per cent of the effort. 7. Use of aids to be independent is allowed. Hakeem Gould., Barthel, D.W. (5735). Functional evaluation: the Barthel Index. [facility] (14)2. Smaanta Charles job Jerzy, BRANMCARMINA, Leilani Crawford., Liana Rosenberg., [place], [address] [place] [address] (1999). Measuring the change indisability after inpatient rehabilitation; comparison of the responsiveness of the Barthel Index and Functional McClain Measure. Journal of Neurology, Neurosurgery, and Psychiatry, 66(4), 347-846. ISABEL Norman.TEO.NORM, EVERARDO Amor, & Nhi Rose M.A. (2004.) Assessment of post-stroke quality of life in cost-effectiveness studies: The usefulness of the Barthel Index and the EuroQoL-5D. [facility], 13, 700-89 G codes: In compliance with CMSs Claims Based Outcome Reporting, the following G-code set was chosen for this patient based on their primary functional limitation being treated:  
 
The outcome measure chosen to determine the severity of the functional limitation was the Barthel Index with a score of 80/100 which was correlated with the impairment scale. ? Self Care:  
  - CURRENT STATUS: CI - 1%-19% impaired, limited or restricted  - GOAL STATUS: CI - 1%-19% impaired, limited or restricted  - D/C STATUS:  CI - 1%-19% impaired, limited or restricted Occupational Therapy Evaluation Charge Determination History Examination Decision-Making LOW Complexity : Brief history review  LOW Complexity : 1-3 performance deficits relating to physical, cognitive , or psychosocial skils that result in activity limitations and / or participation restrictions  LOW Complexity : No comorbidities that affect functional and no verbal or physical assistance needed to complete eval tasks Based on the above components, the patient evaluation is determined to be of the following complexity level: LOW Pain: 
Pain Scale 1: Numeric (0 - 10) Pain Intensity 1: 0 Activity Tolerance:  
Good for basic ADLs. No PEARL, no LOB, denies dizziness After treatment:  
[x]  Patient left in no apparent distress sitting up in chair 
[]  Patient left in no apparent distress in bed 
[x]  Call bell left within reach [x]  Nursing notified 
[]  Caregiver present 
[]  Bed alarm activated COMMUNICATION/EDUCATION:  
Communication/Collaboration: 
[x]      Home safety education was provided and the patient/caregiver indicated understanding. [x]      Patient/family have participated as able and agree with findings and recommendations. []      Patient is unable to participate in plan of care at this time. Findings and recommendations were discussed with: Physical Therapist and Registered Nurse Stephanie Lynn OT Time Calculation: 13 mins

## 2018-09-30 NOTE — PROGRESS NOTES
physical Therapy EVALUATION/DISCHARGE Patient: Kelly Lim Sr. (80 y.o. male) Date: 9/30/2018 Primary Diagnosis: Acute encephalopathy Precautions:   DNR 
ASSESSMENT : 
Based on the objective data described below, the patient presents with baseline functional mobility skills. Despite apparent seizure, and UTI, as well as reported dementia, patient without concern throughout all activity. He was easily able to ambulate both with and without AD, and complete extended stair training as his request as he is typically active exercising daily. No further PT needs or concerns; encouraged patient to mobilize at least 3 times daily throughout remainder of admission in order to maintain current strength and functioning with RN notified of such. Skilled physical therapy is not indicated at this time. PLAN : 
Discharge Recommendations: None Further Equipment Recommendations for Discharge: none SUBJECTIVE:  
Patient stated I ride 17-18 miles a day on the bike.  OBJECTIVE DATA SUMMARY:  
HISTORY:   
Past Medical History:  
Diagnosis Date  CAD (coronary artery disease)  Hypercholesteremia  Hypertension  Seizures ([address]   
 last seizure in 2011 per pt Past Surgical History:  
Procedure Laterality Date  CARDIAC SURG PROCEDURE UNLIST    
 cardiac stents [address]  HX CHOLECYSTECTOMY  2007  CT EGD BALLOON DILATION ESOPHAGUS <30 MM DIAM  11/21/2012 Prior Level of Function/Home Situation: Independent community ambulator, receiving S when needed from family with whom he lives with. Runs errands, etc, and works out daily on his exercise bike. Enjoys grocery shopping 'with the buggy'. Denies falls. Personal factors and/or comorbidities impacting plan of care:  
 
Home Situation Home Environment: Private residence (double wide) # Steps to Enter: 4 Rails to Enter: Yes Hand Rails : Bilateral 
One/Two Story Residence: One story Living Alone: No 
 Support Systems: Child(eunice) Patient Expects to be Discharged to[de-identified] Private residence Current DME Used/Available at Home: Walker, rolling Tub or Shower Type: Tub/Shower combination EXAMINATION/PRESENTATION/DECISION MAKING:  
Critical Behavior: 
Neurologic State: Alert, Appropriate for age Orientation Level: Oriented X4 Cognition: Follows commands Hearing: Auditory Auditory Impairment: Hard of hearing, bilateral 
 
Range Of Motion: 
AROM: Within functional limits PROM: Within functional limits Strength:   
Strength: Within functional limits Tone & Sensation:  
Tone: Normal 
  
  
  
  
Sensation: Intact Coordination: 
Coordination: Within functional limits Vision:  
Acuity:  (R eye \"5%\", L eye WDL) Corrective Lenses: Glasses Functional Mobility: 
Bed Mobility: 
Rolling: Supervision Supine to Sit: Supervision Scooting: Supervision Transfers: 
Sit to Stand: Supervision Stand to Sit: Supervision Balance:  
Sitting: Intact Standing: Impaired Standing - Static: Constant support;Good Standing - Dynamic : Good Ambulation/Gait Training: 
Distance (ft): 330 Feet (ft) (3/4 with RW; remainder without) Assistive Device: Gait belt;Walker, rolling (no device) Gait Description (WDL): Exceptions to [facility] Gait Speed: 
 
Utilizing distance of 22 feet for test, as well as feet to meters calculation, patient ambulated at 1.3 m/s with self-selected gait speed. Vikash SHANKAR. \"Comfortable and maximum walking speed of adults aged 20-79 years: reference values and determinants. \" Age and Agin Volume 26(1):15-9. Bubba Rush. \"Age- and gender-related test performance in community-dwelling elderly people: Six-Minute Walk Test, Bishop Balance Scale, Timed Up & Go Test, and gait speeds. \" Physical Therapy: 2002 Volume 82(2):128-37. Syeda Martines DM, Nirmala PW, Bobby BRUCED, Jaspreet DICK. \"Assessing stability and change of four performance measures: a longitudinal study evaluating outcome following total hip and knee arthroplasty. \" [facility] Musculoskeletal Disorders: 2005 Volume 6(3). Yuan Devries, PhD; Quentin Beard, PhD. Maxime Miller Paper: \"Walking Speed: the Sixth Vital Sign\" Journal of Geriatric Physical Therapy: 2009 - Volume 32 - Issue 2 - p 25 . Miroslava Navas DPT; Modesto Miller PhD; Jeannine Mccarty PT PHD. Walking Speed: The Functional Vital Sign. Journal of Aging Phys Act 2015 April; 23(2):314-322. Stairs: 
Number of Stairs Trained: 26 
Stairs - Level of Assistance: Modified independent Rail Use: Right  (step through) Functional Measure: 
Timed up and go: 
 
 10 seconds without device Timed Up and Go and G-code impairment scale: 
Percentage of Impairment CH 
 
0% 
 CI 
 
1-19% CJ 
 
20-39% CK 
 
40-59% CL 
 
60-79% CM 
 
80-99% CN  
 
100% Timed Score 0-56 10 11-12 13-14 15-16 17-18 19 20  
 
 
< than 10 seconds=Normal  Greater then 13.5 seconds (in elderly)=Increased fall risk Jamal COYNE. Predicting the probability for falls in community dwelling older adults using the Timed Up and Go Test. Phys Ther. 2000;80:896-903. G codes: In compliance with CMSs Claims Based Outcome Reporting, the following G-code set was chosen for this patient based on their primary functional limitation being treated: The outcome measure chosen to determine the severity of the functional limitation was the TUG with a score of 10 SECONDS which was correlated with the impairment scale. ? Mobility - Walking and Moving Around:  
  - CURRENT STATUS: CH - 0% impaired, limited or restricted  - GOAL STATUS: CH - 0% impaired, limited or restricted  - D/C STATUS:  CH - 0% impaired, limited or restricted Pain: 
Pain Scale 1: Numeric (0 - 10) Pain Intensity 1: 0 
  
 Activity Tolerance: WNL Please refer to the flowsheet for vital signs taken during this treatment. After treatment:  
[x]   Patient left in no apparent distress sitting up in chair 
[]   Patient left in no apparent distress in bed 
[x]   Call bell left within reach [x]   Nursing notified 
[]   Caregiver present 
[]   Bed alarm activated COMMUNICATION/EDUCATION:  
Communication/Collaboration: 
[x]   Fall prevention education was provided and the patient/caregiver indicated understanding. [x]   Patient/family have participated as able and agree with findings and recommendations. []   Patient is unable to participate in plan of care at this time. Findings and recommendations were discussed with: Occupational Therapist and Registered Nurse Thank you for this referral. 
Dorothea Lord, PT, DPT Board-Certified Geriatric Clinical Specialist  
Certified Exercise Expert for Aging Adults Time Calculation: 21 mins

## 2018-09-30 NOTE — PROGRESS NOTES
Hospitalist Progress Note NAME: Huey Byrd Sr.  
:  1927 MRN:  [id number] Assessment / Plan: 
Acute metabolic encephalopathy due to postictal state from seizures Seizure relapse due to urinary tract infection Hx of dementia CT head shows no acute process Seen by neurology and recommended to continue current dose of Keppra No driving, fall and seizure precautions No need of EEG per neuro Cont galantamine for dementia PT/OT consult Suspected gram-negative urinary tract infection Urine culture has been sent Rocephin New onset atrial fibrillation with rapid ventricular rate Currently rate is well controlled on p.o. Cardizem Cardiology recommends against anticoagulation TSH is within normal limits Cont home asa Coronary artery disease status post stent POA Hypercholesterolemia POA Hypertension POA Continue aspirin and Crestor Continue Coreg we will transition from Norvasc to Cardizem for rate control No ischemic changes on EKG Systolic murmur POA suspect aortic sclerosis(seen on echo in ) Echo ordered Baseline dementia continue home meds 
  
 Body mass index is 19.46 kg/(m^2). Dispo: Discharge in am tomorrow 
  
 
  
DVT prophylaxis with lovenox 
  
Code status: DNR/DNI, d/w mPOA Tati at bedside NOK: Daughter Meron Sandy Body mass index is 19.46 kg/(m^2). Recommended Disposition: Home w/Family Subjective: Chief Complaint / Reason for Physician Visit No further seizures. Severe hard of hearing. No headache. Review of Systems: 
Symptom Y/N Comments  Symptom Y/N Comments Fever/Chills n   Chest Pain n   
Poor Appetite    Edema Cough n   Abdominal Pain Sputum    Joint Pain SOB/PEARL n   Pruritis/Rash Nausea/vomit    Tolerating PT/OT Diarrhea    Tolerating Diet Constipation    Other Could NOT obtain due to:   
 
Objective: VITALS:  
Last 24hrs VS reviewed since prior progress note. Most recent are: Patient Vitals for the past 24 hrs: 
 Temp Pulse Resp BP SpO2  
09/30/18 1055 98 °F (36.7 °C) 62 18 170/65 97 % 09/30/18 0731 98.2 °F (36.8 °C) 94 18 171/83 96 % 09/30/18 0354 98.7 °F (37.1 °C) 78 18 155/79 96 % 09/30/18 0000 98.3 °F (36.8 °C) 72 18 124/58 96 %  
09/29/18 2105 98.8 °F (37.1 °C) 70 16 144/63 95 % 09/29/18 1601 98.7 °F (37.1 °C) 86 20 124/59 95 % 09/29/18 1309 98.6 °F (37 °C) (!) 52 20 118/73 95 % Intake/Output Summary (Last 24 hours) at 09/30/18 1134 Last data filed at 09/30/18 6423 Gross per 24 hour Intake           1372.5 ml Output              875 ml Net            497.5 ml PHYSICAL EXAM: 
General: cooperative, no acute distress   
EENT:  EOMI. Anicteric sclerae. MMM Resp:  CTA bilaterally, no wheezing or rales. No accessory muscle use CV:  Regular  rhythm,  No edema GI:  Soft, Non distended, Non tender.  +Bowel sounds Neurologic:  Alert and awake , normal speech, Psych:   Not anxious nor agitated Skin:  No rashes. No jaundice Reviewed most current lab test results and cultures  YES Reviewed most current radiology test results   YES Review and summation of old records today    NO Reviewed patient's current orders and MAR    YES 
PMH/ reviewed - no change compared to H&P Current Facility-Administered Medications:  
  sodium chloride (NS) flush 5-10 mL, 5-10 mL, IntraVENous, Q8H, Chanelle Mora MD, 10 mL at 09/29/18 1448   sodium chloride (NS) flush 5-10 mL, 5-10 mL, IntraVENous, PRN, Chanelle Mora MD 
  acetaminophen (TYLENOL) tablet 650 mg, 650 mg, Oral, Q6H PRN, Chanelle Mora MD 
  naloxone [facility]) injection 0.4 mg, 0.4 mg, IntraVENous, PRN, Chanelle Mora MD 
  ondansetron [facility]) injection 4 mg, 4 mg, IntraVENous, Q4H PRN, Chanelle Mora MD 
  bisacodyl (DULCOLAX) suppository 10 mg, 10 mg, Rectal, DAILY PRN, Chanelle Mora MD 
   0.9% sodium chloride infusion, 75 mL/hr, IntraVENous, CONTINUOUS, Sathish Tsai MD, Last Rate: 75 mL/hr at 09/30/18 0000, 75 mL/hr at 09/30/18 0000 
  galantamine (RAZADYNE) tablet 8 mg, 8 mg, Oral, BID WITH MEALS, Sathish Tsai MD, 8 mg at 09/30/18 6314   aspirin chewable tablet 81 mg, 81 mg, Oral, QHS, Sathish Tsai MD, 81 mg at 09/29/18 2113   carvedilol (COREG) tablet 3.125 mg, 3.125 mg, Oral, BID WITH MEALS, Sathish Tsai MD, 3.125 mg at 09/30/18 0836 
  atorvastatin (LIPITOR) tablet 10 mg, 10 mg, Oral, QHS, Sathish Tsai MD, 10 mg at 09/29/18 2113   heparin (porcine) injection 5,000 Units, 5,000 Units, SubCUTAneous, Q12H, Adry oPlanco MD, 5,000 Units at 09/30/18 1057   levETIRAcetam (KEPPRA) tablet 1,500 mg, 1,500 mg, Oral, BID, Sathish Tsai MD, 1,500 mg at 09/30/18 4383   dilTIAZem (CARDIZEM) IR tablet 30 mg, 30 mg, Oral, AC&HS, Sathish Tsai MD, 30 mg at 09/30/18 1058   influenza vaccine 2018-19 (6 mos+)(PF) (FLUARIX QUAD/FLULAVAL QUAD) injection 0.5 mL, 0.5 mL, IntraMUSCular, PRIOR TO DISCHARGE, Korin Sanchez MD 
________________________________________________________________________ Care Plan discussed with: 
  Comments Patient y Family RN y   
Care Manager Consultant Multidiciplinary team rounds were held today with , nursing, pharmacist and clinical coordinator. Patient's plan of care was discussed; medications were reviewed and discharge planning was addressed. ________________________________________________________________________ Total NON critical care TIME:  35    Minutes Total CRITICAL CARE TIME Spent:   Minutes non procedure based Comments >50% of visit spent in counseling and coordination of care    
________________________________________________________________________ Korin Sanchez MD  
 
 Procedures: see electronic medical records for all procedures/Xrays and details which were not copied into this note but were reviewed prior to creation of Plan. LABS: 
I reviewed today's most current labs and imaging studies. Pertinent labs include: 
Recent Labs  
   09/30/18 
 0412  09/29/18 
 0709 WBC  8.6  9.3 HGB  10.5*  12.7 HCT  30.7*  37.4 PLT  164  189 Recent Labs  
   09/30/18 
 0412  09/29/18 
 1064 NA  139  136  
K  3.7  4.4 CL  106  101 CO2  26  30 GLU  103*  118* BUN  18  17 CREA  0.89  1.04  
CA  8.2*  9.3 ALB  3.2*  4.0 TBILI  0.5  0.4 SGOT  12*  14* ALT  14  20 Signed: Chau Beyer MD

## 2018-09-30 NOTE — ROUTINE PROCESS
Bedside shift change report given to Ethan Cruz RN (oncoming nurse) by Ronald Jesus RN (offgoing nurse). Report included the following information SBAR, Intake/Output, MAR, Accordion and Recent Results. 
  
Zone Phone:   [phone] 
  
  
Significant changes during shift:  none 
  
  
  
Patient Information 
  
Naz Hess Sr. 
80 y.o. 
9/29/2018  6:33 AM by Rima Perez MD. Naz Hess Sr. was admitted from Home 
  
Problem List 
  
    
Patient Active Problem List  
  Diagnosis Date Noted  Acute encephalopathy 09/29/2018  Atrial fibrillation with RVR ([facility] [address] 09/29/2018  Alzheimer's type dementia with late onset without behavioral disturbance 06/12/2017  Complex partial seizures with consciousness impaired ([facility] [address] 12/21/2015  Elevated troponin 07/29/2014  CAD (coronary artery disease) 07/28/2014  
 HTN (hypertension) 07/28/2014  Hyperlipidemia 07/28/2014  
  
    
Past Medical History:  
Diagnosis Date  CAD (coronary artery disease)    
 Hypercholesteremia    
 Hypertension    
 Seizures (HCC)    
  last seizure in 2011 per pt  
  
  
  
Core Measures: 
  
CVA: No No 
  
Activity Status: 
  
OOB to Chair No 
Ambulated this shift No  
Bed Rest No 
  
DVT prophylaxis: 
  
DVT prophylaxis Med- Yes DVT prophylaxis SCD or AMBER- No  
  
Patient Safety: 
  
Falls Score Total Score: 3 Safety Level_______ Bed Alarm On? Yes Sitter? No 
  
Plan for upcoming shift: Monitor, Echo 
  
  
  
Discharge Plan:  TBD 
  
Active Consults: 
IP CONSULT TO NEUROLOGY 
IP CONSULT TO CARDIOLOGY

## 2018-09-30 NOTE — PROGRESS NOTES
NEUROLOGY NOTE Chief Complaint Patient presents with  Seizure SUBJECTIVE: 
Pt is back to baseline. HPI Dani Chandler is a 80 y.o. male who presents to the hospital because of altered mental status. He does have hx of seizures which started around 12 yrs ago. Last sz was a number of yrs ago and is maintained on keppra 1500 mg po bid. He does have early stages of dementia as well and is on galantamine. He was seen by her daughter in the morning and he was in the bed and was not responding. He was just stareing which got better with time. He usually has staring spells with hand and mouth automatism. He was also found to have UTI. ROS A ten system review of constitutional, cardiovascular, respiratory, musculoskeletal, endocrine, skin, SHEENT, genitourinary, psychiatric and neurologic systems was obtained and is unremarkable except as stated in HPI  
 
PMH Past Medical History:  
Diagnosis Date  CAD (coronary artery disease)  Hypercholesteremia  Hypertension  Seizures ([address]   
 last seizure in 2011 per pt  
 
 
FH Family History Problem Relation Age of Onset  Alcohol abuse Mother [address] [facility] Social History Social History  Marital status:  Spouse name: N/A  
 Number of children: N/A  
 Years of education: N/A Social History Main Topics  Smoking status: Former Smoker  Smokeless tobacco: Never Used  Alcohol use No  
 Drug use: No  
 Sexual activity: Not on file Other Topics Concern  Not on file Social History Narrative ALLERGIES No Known Allergies PHYSICAL EXAMINATION:  
Patient Vitals for the past 24 hrs: 
 Temp Pulse Resp BP SpO2  
09/30/18 0731 98.2 °F (36.8 °C) 94 18 171/83 96 % 09/30/18 0354 98.7 °F (37.1 °C) 78 18 155/79 96 % 09/30/18 0000 98.3 °F (36.8 °C) 72 18 124/58 96 %  
09/29/18 2105 98.8 °F (37.1 °C) 70 16 144/63 95 % 09/29/18 1601 98.7 °F (37.1 °C) 86 20 124/59 95 % 09/29/18 1309 98.6 °F (37 °C) (!) 52 20 118/73 95 % 09/29/18 1015 - (!) 116 16 138/59 96 % General:  
General appearance: Pt is in no acute distress Distal pulses are preserved Neurological Examination:  
Mental Status:  AAO x3. Speech is fluent. Follows commands, has abnormal fund of knowledge, attention, short term recall, comprehension and insight. Cranial Nerves: Visual fields are full. PERRL, Extraocular movements are full. Facial sensation intact. Facial movement intact. Hearing intact to conversation. Palate elevates symmetrically. Shoulder shrug symmetric. Tongue midline. Motor: Strength is 5/5 in UE and 4/5 in LE. Normal tone. No atrophy. Sensation: Normal to light touch Coordination/Cerebellar: Unable to assess Gait: deferred Skin: No significant bruising or lacerations. LAB DATA REVIEWED:   
Recent Results (from the past 24 hour(s)) EKG, 12 LEAD, INITIAL Collection Time: 09/30/18  4:01 AM  
Result Value Ref Range Ventricular Rate 79 BPM  
 Atrial Rate 79 BPM  
 P-R Interval 262 ms QRS Duration 90 ms Q-T Interval 398 ms QTC Calculation (Bezet) 456 ms Calculated P Axis 39 degrees Calculated R Axis -11 degrees Calculated T Axis 56 degrees Diagnosis Sinus rhythm with 1st degree AV block Cannot rule out Anterior infarct , age undetermined When compared with ECG of 29-SEP-2018 10:40, 
MANUAL COMPARISON REQUIRED, DATA IS UNCONFIRMED METABOLIC PANEL, COMPREHENSIVE Collection Time: 09/30/18  4:12 AM  
Result Value Ref Range Sodium 139 136 - 145 mmol/L Potassium 3.7 3.5 - 5.1 mmol/L Chloride 106 97 - 108 mmol/L  
 CO2 26 21 - 32 mmol/L Anion gap 7 5 - 15 mmol/L Glucose 103 (H) 65 - 100 mg/dL BUN 18 6 - 20 MG/DL Creatinine 0.89 0.70 - 1.30 MG/DL  
 BUN/Creatinine ratio 20 12 - 20 GFR est AA >60 >60 ml/min/1.73m2 GFR est non-AA >60 >60 ml/min/1.73m2  Calcium 8.2 (L) 8.5 - 10.1 MG/DL  
 Bilirubin, total 0.5 0.2 - 1.0 MG/DL  
 ALT (SGPT) 14 12 - 78 U/L  
 AST (SGOT) 12 (L) 15 - 37 U/L Alk. phosphatase 78 45 - 117 U/L Protein, total 6.7 6.4 - 8.2 g/dL Albumin 3.2 (L) 3.5 - 5.0 g/dL Globulin 3.5 2.0 - 4.0 g/dL A-G Ratio 0.9 (L) 1.1 - 2.2    
CBC WITH AUTOMATED DIFF Collection Time: 09/30/18  4:12 AM  
Result Value Ref Range WBC 8.6 4.1 - 11.1 K/uL  
 RBC 3.15 (L) 4.10 - 5.70 M/uL  
 HGB 10.5 (L) 12.1 - 17.0 g/dL HCT 30.7 (L) 36.6 - 50.3 % MCV 97.5 80.0 - 99.0 FL  
 MCH 33.3 26.0 - 34.0 PG  
 MCHC 34.2 30.0 - 36.5 g/dL  
 RDW 12.5 11.5 - 14.5 % PLATELET 439 598 - 104 K/uL MPV 10.7 8.9 - 12.9 FL  
 NRBC 0.0 0  WBC ABSOLUTE NRBC 0.00 0.00 - 0.01 K/uL NEUTROPHILS 69 32 - 75 % LYMPHOCYTES 18 12 - 49 % MONOCYTES 11 5 - 13 % EOSINOPHILS 0 0 - 7 % BASOPHILS 1 0 - 1 % IMMATURE GRANULOCYTES 1 (H) 0.0 - 0.5 % ABS. NEUTROPHILS 6.0 1.8 - 8.0 K/UL  
 ABS. LYMPHOCYTES 1.6 0.8 - 3.5 K/UL  
 ABS. MONOCYTES 1.0 0.0 - 1.0 K/UL  
 ABS. EOSINOPHILS 0.0 0.0 - 0.4 K/UL  
 ABS. BASOPHILS 0.0 0.0 - 0.1 K/UL  
 ABS. IMM. GRANS. 0.0 0.00 - 0.04 K/UL  
 DF AUTOMATED    
TSH 3RD GENERATION Collection Time: 09/30/18  4:12 AM  
Result Value Ref Range TSH 2.65 0.36 - 3.74 uIU/mL Imaging review: 
CT Head Normal 
 
2014 EEG This is a normal electroencephalogram with the patient awake and asleep, showing no clear focal abnormalities or epileptiform activity. A normal EEG doesn't not rule out seizures. Clinical  
correlation recommended. HOME MEDS Prior to Admission Medications Prescriptions Last Dose Informant Patient Reported? Taking? ASCORBATE CALCIUM (VITAMIN C PO)   Yes No  
Sig: Take  by mouth. Calcium-Cholecalciferol, D3, 600 mg(1,500mg) -400 unit chew   Yes No  
Sig: Take  by mouth. amlodipine (NORVASC) 5 mg tablet   Yes No  
Sig: Take 5 mg by mouth daily. aspirin 81 mg chewable tablet   Yes No  
Sig: Take 81 mg by mouth nightly. carvedilol (COREG) 3.125 mg tablet   No No  
Sig: Take 1 Tab by mouth two (2) times daily (with meals). cyanocobalamin (VITAMIN B-12) 2,500 mcg sublingual tablet   Yes No  
Sig: Take 2,500 mcg by mouth daily. galantamine (RAZADYNE) 8 mg SR capsule   No No  
Sig: take 1 capsule by mouth daily before BREAKFAST  
levETIRAcetam (KEPPRA) 750 mg tablet   No No  
Sig: take 2 tablets by mouth twice a day  
rosuvastatin (CRESTOR) 5 mg tablet   Yes No  
Sig: Take 5 mg by mouth daily. Facility-Administered Medications: None CURRENT MEDS Current Facility-Administered Medications Medication Dose Route Frequency  sodium chloride (NS) flush 5-10 mL  5-10 mL IntraVENous Q8H  
 0.9% sodium chloride infusion  75 mL/hr IntraVENous CONTINUOUS  
 galantamine (RAZADYNE) tablet 8 mg  8 mg Oral BID WITH MEALS  
 aspirin chewable tablet 81 mg  81 mg Oral QHS  carvedilol (COREG) tablet 3.125 mg  3.125 mg Oral BID WITH MEALS  
 atorvastatin (LIPITOR) tablet 10 mg  10 mg Oral QHS  heparin (porcine) injection 5,000 Units  5,000 Units SubCUTAneous Q12H  levETIRAcetam (KEPPRA) tablet 1,500 mg  1,500 mg Oral BID  dilTIAZem (CARDIZEM) IR tablet 30 mg  30 mg Oral AC&HS  influenza vaccine 2018-19 (6 mos+)(PF) (FLUARIX QUAD/FLULAVAL QUAD) injection 0.5 mL  0.5 mL IntraMUSCular PRIOR TO DISCHARGE IMPRESSION: 
Abhishek Goode . is a 80 y.o. male who presents with breakthrough seizure in the setting of UTI. Seizures have been well controlled on keppra 1500 mg po bid and this most probably was triggered secondary to UTI. Pt does also have dementia. He does also have new onset A fib. RECOMMENDATIONS: 
1. Cont Keppra 1500 mg po bid 2. Seizure precautions 3. EEG - no need at this time. Pt is back to baseline. 4. Galantamine home dosage for dementia No further neuro w/u. Call with questions. Thank you very much for this consultation.   
 
Chun Noonan MD 
Neurologist

## 2018-09-30 NOTE — PROGRESS NOTES
Bedside shift change report given to rBit Fuentes RN (oncoming nurse) by Titus Levy RN (offgoing nurse). Report included the following information SBAR, Intake/Output, MAR, Accordion and Recent Results. 
  
Zone Phone:   [phone] 
  
  
Significant changes during shift:  None 
  
  
  
Patient Information 
  
Gudelia Little Sr. 
80 y.o. 
9/29/2018  6:33 AM by Lang Flores MD. Gudelia Little Sr. was admitted from Home 
  
Problem List 
  
    
Patient Active Problem List  
  Diagnosis Date Noted  Acute encephalopathy 09/29/2018  Atrial fibrillation with RVR ([facility] [address] 09/29/2018  Alzheimer's type dementia with late onset without behavioral disturbance 06/12/2017  Complex partial seizures with consciousness impaired ([facility] [address] 12/21/2015  Elevated troponin 07/29/2014  CAD (coronary artery disease) 07/28/2014  
 HTN (hypertension) 07/28/2014  Hyperlipidemia 07/28/2014  
  
    
Past Medical History:  
Diagnosis Date  CAD (coronary artery disease)    
 Hypercholesteremia    
 Hypertension    
 Seizures (HCC)    
  last seizure in 2011 per pt  
  
  
  
Core Measures: 
  
CVA: No No 
  
Activity Status: 
  
OOB to Chair No 
Ambulated this shift No  
Bed Rest No 
  
DVT prophylaxis: 
  
DVT prophylaxis Med- Yes DVT prophylaxis SCD or AMBER- No  
  
Patient Safety: 
  
Falls Score Total Score: 3 Safety Level_______ Bed Alarm On? Yes Sitter? No 
  
Plan for upcoming shift: Monitor, Echo 
  
  
  
Discharge Plan: No TBD 
  
Active Consults: 
IP CONSULT TO NEUROLOGY 
IP CONSULT TO CARDIOLOGY

## 2018-10-01 ENCOUNTER — HOME HEALTH ADMISSION (OUTPATIENT)
Dept: HOME HEALTH SERVICES | Facility: HOME HEALTH | Age: 83
End: 2018-10-01

## 2018-10-01 VITALS
OXYGEN SATURATION: 96 % | BODY MASS INDEX: 19.4 KG/M2 | DIASTOLIC BLOOD PRESSURE: 73 MMHG | SYSTOLIC BLOOD PRESSURE: 155 MMHG | RESPIRATION RATE: 20 BRPM | WEIGHT: 128 LBS | HEIGHT: 68 IN | TEMPERATURE: 97.5 F | HEART RATE: 70 BPM

## 2018-10-01 LAB
BACTERIA SPEC CULT: ABNORMAL
BACTERIA SPEC CULT: ABNORMAL
CC UR VC: ABNORMAL
SERVICE CMNT-IMP: ABNORMAL

## 2018-10-01 PROCEDURE — 74011000258 HC RX REV CODE- 258: Performed by: INTERNAL MEDICINE

## 2018-10-01 PROCEDURE — 74011250636 HC RX REV CODE- 250/636: Performed by: EMERGENCY MEDICINE

## 2018-10-01 PROCEDURE — 94760 N-INVAS EAR/PLS OXIMETRY 1: CPT

## 2018-10-01 PROCEDURE — 74011250636 HC RX REV CODE- 250/636: Performed by: INTERNAL MEDICINE

## 2018-10-01 PROCEDURE — 74011250637 HC RX REV CODE- 250/637: Performed by: INTERNAL MEDICINE

## 2018-10-01 PROCEDURE — 90471 IMMUNIZATION ADMIN: CPT

## 2018-10-01 PROCEDURE — 90686 IIV4 VACC NO PRSV 0.5 ML IM: CPT | Performed by: INTERNAL MEDICINE

## 2018-10-01 RX ORDER — DILTIAZEM HYDROCHLORIDE 180 MG/1
180 CAPSULE, COATED, EXTENDED RELEASE ORAL DAILY
Qty: 30 CAP | Refills: 0 | Status: SHIPPED | OUTPATIENT
Start: 2018-10-02 | End: 2018-10-16 | Stop reason: SDUPTHER

## 2018-10-01 RX ORDER — DILTIAZEM HYDROCHLORIDE 180 MG/1
180 CAPSULE, COATED, EXTENDED RELEASE ORAL DAILY
Status: DISCONTINUED | OUTPATIENT
Start: 2018-10-02 | End: 2018-10-01 | Stop reason: HOSPADM

## 2018-10-01 RX ORDER — AMOXICILLIN AND CLAVULANATE POTASSIUM 875; 125 MG/1; MG/1
1 TABLET, FILM COATED ORAL EVERY 12 HOURS
Qty: 10 TAB | Refills: 0 | Status: SHIPPED | OUTPATIENT
Start: 2018-10-01 | End: 2018-10-06

## 2018-10-01 RX ADMIN — INFLUENZA VIRUS VACCINE 0.5 ML: 15; 15; 15; 15 SUSPENSION INTRAMUSCULAR at 14:23

## 2018-10-01 RX ADMIN — HEPARIN SODIUM 5000 UNITS: 5000 INJECTION INTRAVENOUS; SUBCUTANEOUS at 10:41

## 2018-10-01 RX ADMIN — CARVEDILOL 3.12 MG: 3.12 TABLET, FILM COATED ORAL at 09:14

## 2018-10-01 RX ADMIN — GALANTAMINE 8 MG: 4 TABLET, FILM COATED ORAL at 09:14

## 2018-10-01 RX ADMIN — LEVETIRACETAM 1500 MG: 500 TABLET, FILM COATED ORAL at 09:14

## 2018-10-01 RX ADMIN — DILTIAZEM HYDROCHLORIDE 120 MG: 120 CAPSULE, COATED, EXTENDED RELEASE ORAL at 09:14

## 2018-10-01 RX ADMIN — CEFTRIAXONE 1 G: 1 INJECTION, POWDER, FOR SOLUTION INTRAMUSCULAR; INTRAVENOUS at 10:42

## 2018-10-01 NOTE — PROGRESS NOTES
[address] [place] [address]  [phone] Cardiology Progress Note 
 
 
10/1/2018 10:42 AM 
 
Admit Date: 9/29/2018 Admit Diagnosis:  
Acute encephalopathy Subjective:  
 
Sushil Sallie Sr. No complaints Visit Vitals  /72 (BP 1 Location: Right arm, BP Patient Position: At rest)  Pulse (!) 111  Temp 97.6 °F (36.4 °C)  Resp 20  
 Ht 5' 8\" (1.727 m)  Wt 128 lb (58.1 kg)  SpO2 97%  BMI 19.46 kg/m2 Current Facility-Administered Medications Medication Dose Route Frequency  cefTRIAXone (ROCEPHIN) 1 g in 0.9% sodium chloride (MBP/ADV) 50 mL  1 g IntraVENous Q24H  
 [START ON 10/2/2018] dilTIAZem CD (CARDIZEM CD) capsule 180 mg  180 mg Oral DAILY  sodium chloride (NS) flush 5-10 mL  5-10 mL IntraVENous Q8H  
 sodium chloride (NS) flush 5-10 mL  5-10 mL IntraVENous PRN  
 acetaminophen (TYLENOL) tablet 650 mg  650 mg Oral Q6H PRN  
 naloxone (NARCAN) injection 0.4 mg  0.4 mg IntraVENous PRN  
 ondansetron (ZOFRAN) injection 4 mg  4 mg IntraVENous Q4H PRN  
 bisacodyl (DULCOLAX) suppository 10 mg  10 mg Rectal DAILY PRN  
 galantamine (RAZADYNE) tablet 8 mg  8 mg Oral BID WITH MEALS  
 aspirin chewable tablet 81 mg  81 mg Oral QHS  carvedilol (COREG) tablet 3.125 mg  3.125 mg Oral BID WITH MEALS  
 atorvastatin (LIPITOR) tablet 10 mg  10 mg Oral QHS  heparin (porcine) injection 5,000 Units  5,000 Units SubCUTAneous Q12H  levETIRAcetam (KEPPRA) tablet 1,500 mg  1,500 mg Oral BID  influenza vaccine 2018-19 (6 mos+)(PF) (FLUARIX QUAD/FLULAVAL QUAD) injection 0.5 mL  0.5 mL IntraMUSCular PRIOR TO DISCHARGE Objective:  
  
Physical Exam: 
General Appearance:   
Chest:   Clear Cardiovascular:  Regular rate and rhythm, no murmur.  
Extremities: no edema Skin:  Warm and dry.  
 
Data Review:  
Recent Labs  
   09/30/18 
 0412  09/29/18 
 6167 WBC  8.6  9.3 HGB  10.5*  12.7 HCT  30.7*  37.4 PLT  164  189 Recent Labs  
   09/30/18 
 0412  09/29/18 
 6226 NA  139  136  
K  3.7  4.4 CL  106  101 CO2  26  30 GLU  103*  118* BUN  18  17 CREA  0.89  1.04  
CA  8.2*  9.3 ALB  3.2*  4.0 TBILI  0.5  0.4 SGOT  12*  14* ALT  14  20 No results for input(s): TROIQ, CPK, CKMB in the last 72 hours. Intake/Output Summary (Last 24 hours) at 10/01/18 1042 Last data filed at 10/01/18 5207 Gross per 24 hour Intake             1995 ml Output             2025 ml Net              -30 ml Telemetry:  
EKG: 
Cxray: 
 
Assessment:  
 
Active Problems: 
  Acute encephalopathy (9/29/2018) Atrial fibrillation with RVR ([facility] [address] (9/29/2018) Plan: Will increase dilt to 180 daily as BP OK and resting rate in AF ~ 100.   Won't take effect till AM 
 
Nathalia Sánchez M.D., [facility]

## 2018-10-01 NOTE — PROGRESS NOTES
Bedside shift change report given to Elie Bria, [facility] nurse) by Lucho Rashid RN (offgoing nurse). Report included the following information SBAR, Intake/Output, MAR, Accordion and Recent Results. 
   
Zone Phone:   [phone] 
   
   
Significant changes during shift: Ectopy on telemetry. 
   
   
   
Patient Information 
Juan Holloway Sr. 
80 y.o. 
9/29/2018  6:33 AM by Miladis Antony MD. Rod Finley Sr. was admitted from Home 
   
Problem List 
   
       
Patient Active Problem List  
   Diagnosis Date Noted  Acute encephalopathy 09/29/2018  Atrial fibrillation with RVR ([facility] [address] 09/29/2018  Alzheimer's type dementia with late onset without behavioral disturbance 06/12/2017  Complex partial seizures with consciousness impaired ([facility] [address] 12/21/2015  Elevated troponin 07/29/2014  CAD (coronary artery disease) 07/28/2014  
 HTN (hypertension) 07/28/2014  Hyperlipidemia 07/28/2014  
   
       
Past Medical History:  
Diagnosis Date  CAD (coronary artery disease)     
 Hypercholesteremia     
 Hypertension     
 Seizures ([facility] [address]     
   last seizure in 2011 per pt  
   
   
   
Core Measures: 
   
CVA: No No 
   
Activity Status: 
   
OOB to Chair No 
Ambulated this shift No  
Bed Rest No 
   
DVT prophylaxis: 
   
DVT prophylaxis Med- Yes DVT prophylaxis SCD or AMBER- No  
   
Patient Safety: 
   
Falls Score Total Score: 3 Safety Level_______ Bed Alarm On? Yes Sitter? No 
   
Plan for upcoming shift: Monitor, Echo 
   
   
   
Discharge Plan: Home. 
   
Active Consults: 
IP CONSULT TO NEUROLOGY 
IP CONSULT TO CARDIOLOGY

## 2018-10-01 NOTE — ROUTINE PROCESS
Pt discharged. Discharge instructions reviewed with patient and family. Included medications and follow up appointments. Prescriptions handed to family. All questions answered

## 2018-10-01 NOTE — PROGRESS NOTES
Reason for Admission:   Acute encephalopathy RRAT Score:  14 MODERATE Plan for utilizing home health:  Per recommendation Likelihood of Readmission:  Low per acuity. Pt has a strong support system. No problems financially or accessing medications. Pt has no concerns for discharge at this time. Transition of Care Plan:                   
Pt is a 80year old,  male, admitted with Acute encephalopathy. Pt was alert and oriented when meeting with CM confirming address, emergency contact and PCP. Pt states he lives with his daughter in a one level home, 3 steps to enter. Pt has a walker and cane accessible to him but does not use them. Pt states he no longer drives after having seizures in 2014. Pt has not had HH or been to a SNF in the past. PT's preferred pharmacy is the [facility] [place]. Pt has no problems financially or accessing medications. Pt dtr will drive pt home at time of discharge via private vehicle. Pt states he does have advanced directives, just at home. If pt stays the night pt dtr will bring them back. Pt and dtr have no questions or concerns for discharge at this time. No further CM needs identified at this time. Care Management Interventions PCP Verified by CM: Yes Mode of Transport at Discharge: Self Transition of Care Consult (CM Consult): Discharge Planning MyChart Signup: No 
Discharge Durable Medical Equipment: No 
Health Maintenance Reviewed: Yes Physical Therapy Consult: Yes Occupational Therapy Consult: Yes Speech Therapy Consult: No 
Current Support Network: Lives with Caregiver, [place] Confirm Follow Up Transport: Family Plan discussed with Pt/Family/Caregiver: Yes Discharge Location Discharge Placement: Home with family assistance LUZ MARINA Onofre, [facility] [phone]

## 2018-10-01 NOTE — PROGRESS NOTES
Update: HH cannot see pt until PCP visit, which is Friday October 5, 2018 at 11:00 Makay[facility] Edmonds [facility] by physician. Family informed. Dtr will transport pt home. No further CM needs identified. Jason Hernandez placed consult for  for med management. CM spoke with pt and pt dtr, they are requesting [facility]. FOC signed and placed on chart. Referral sent through [address] [place] [address]. LUZ MARINA Victoria, CM [facility] [phone]

## 2018-10-01 NOTE — PROGRESS NOTES
Spoke to Dr Lavern Nageotte, no need for ECHO and can be done on out pt basis Recommended to send him home on higher dose of cardizem at 180 mg daily.

## 2018-10-03 ENCOUNTER — HOME CARE VISIT (OUTPATIENT)
Dept: SCHEDULING | Facility: HOME HEALTH | Age: 83
End: 2018-10-03

## 2018-10-03 PROCEDURE — G0299 HHS/HOSPICE OF RN EA 15 MIN: HCPCS

## 2018-10-05 NOTE — DISCHARGE SUMMARY
Hospitalist Discharge Summary Patient ID: 
Marguerite Blanco 
[id number] 
28 y.o. 
2/27/1927 PCP on record: Jodi Barakat NP Admit date: 9/29/2018 Discharge date and time: 10/1/18 DISCHARGE DIAGNOSIS: 
 
Seizure relapse due to urinary tract infection Hx of dementia Enterococcus urinary tract infection New onset atrial fibrillation with rapid ventricular rate Coronary artery disease status post stent POA Hypercholesterolemia POA Hypertension POA Systolic murmur POA suspect aortic sclerosis(seen on echo in 2014) Baseline dementia continue home meds CONSULTATIONS: 
IP CONSULT TO NEUROLOGY 
IP CONSULT TO CARDIOLOGY Excerpted HPI from H&P of Merlyn Burrell MD: 
49-year-old white male who currently lives with his daughter Kelvin Kamara, who is mPOA.   
Known seizure disorder on keppra x years, has not a seizure they are aware of in many years Doing well recently until this morning.   
Daughter found the patient having a seizure.   
       Arms were shaking mildly and he was staring off into space and not responsive, typical for his prior seizures Afterwards he was very lethargic and not at his baseline mental status.   
The family brought him to the emergency room. No recent headaches or fevers or cough or chest pain or shortness of breath or nausea vomiting or diarrhea In the ED, he was very lethargic initially He then gradually began to wake up and talk, but was still very confused and not at baseline even when I saw .   
No further seizures in the emergency room. Pt denied pain, headache or SOB No SIRS citeria   
UA showed possible evidence of UTI.   
Head CT scan was unremarkable.   
Received IV keppra 500 mg in ED Plan was for discharge home if MS improved, but it did not Developed atrial fibrillation with RVR while in the emergency room.   
Because of the prolonged confusion and concern for encephalopathy either from postictal state or UTI, and the new onset atrial fibrillation, we were called to admit the patient. 
 
______________________________________________________________________ DISCHARGE SUMMARY/HOSPITAL COURSE:  for full details see H&P, daily progress notes, labs, consult notes. Seizure relapse due to urinary tract infection Hx of dementia CT head shows no acute process Seen by neurology and recommended to continue current dose of Keppra No driving, fall and seizure precautions No need of EEG per neuro Cont galantamine for dementia PT/OT consult Enterococcus urinary tract infection Sent on Augmentin based on cx results New onset atrial fibrillation with rapid ventricular rate Currently rate is well controlled on p.o. Cardizem Cardiology recommends against anticoagulation TSH is within normal limits Cont home asa 
  
Coronary artery disease status post stent POA Hypercholesterolemia POA Hypertension POA Continue aspirin and Crestor Continue Coreg we will transition from Norvasc to Cardizem for rate control No ischemic changes on EKG Systolic murmur POA suspect aortic sclerosis(seen on echo in 2014) Per cards can have out pt echo Baseline dementia continue home meds 
 
 
 
_______________________________________________________________________ Patient seen and examined by me on discharge day. Pertinent Findings: 
Gen:    Not in distress Chest: Clear lungs CVS:   Regular rhythm. No edema Abd:  Soft, not distended, not tender Neuro:  Alert, awake 
_______________________________________________________________________ DISCHARGE MEDICATIONS:  
Discharge Medication List as of 10/1/2018  2:35 PM  
  
START taking these medications Details  
dilTIAZem CD (CARDIZEM CD) 180 mg ER capsule Take 1 Cap by mouth daily for 30 days. , Print, Disp-30 Cap, R-0  
  
amoxicillin-clavulanate (AUGMENTIN) 875-125 mg per tablet Take 1 Tab by mouth every twelve (12) hours for 5 days. , Print, Disp-10 Tab, R-0  
  
  
CONTINUE these medications which have NOT CHANGED Details  
galantamine (RAZADYNE) 8 mg SR capsule take 1 capsule by mouth daily before BREAKFAST, Normal, Disp-90 Cap, R-1  
  
levETIRAcetam (KEPPRA) 750 mg tablet take 2 tablets by mouth twice a day, Normal, Disp-360 Tab, R-5  
  
cyanocobalamin (VITAMIN B-12) 2,500 mcg sublingual tablet Take 2,500 mcg by mouth daily. , Historical Med ASCORBATE CALCIUM (VITAMIN C PO) Take  by mouth., Historical Med  
  
Calcium-Cholecalciferol, D3, 600 mg(1,500mg) -400 unit chew Take  by mouth., Historical Med  
  
carvedilol (COREG) 3.125 mg tablet Take 1 Tab by mouth two (2) times daily (with meals). , Print, Disp-60 Tab, R-0  
  
aspirin 81 mg chewable tablet Take 81 mg by mouth nightly., Historical Med  
  
rosuvastatin (CRESTOR) 5 mg tablet Take 5 mg by mouth daily. , Historical Med  
  
  
STOP taking these medications  
  
 amlodipine (NORVASC) 5 mg tablet Comments:  
Reason for Stopping: My Recommended Diet, Activity, Wound Care, and follow-up labs are listed in the patient's Discharge Insturctions which I have personally completed and reviewed. _______________________________________________________________________ DISPOSITION:   
Home with Family: y  
Home with HH/PT/OT/RN:   
SNF/LTC:   
TIMMY:   
OTHER:   
 
 
Condition at Discharge:  Stable 
_______________________________________________________________________ Follow up with: PCP : Jose Antonio Bonilla NP Follow-up Information Follow up With Details Comments Contact Info Jose Antonio Bonilla NP Schedule an appointment as soon as possible for a visit in 1 week Practice asks for patient to call to schedule follow up appointment [address] [id number] [address] 
[phone] [place] in 1 week Total time in minutes spent coordinating this discharge (includes going over instructions, follow-up, prescriptions, and preparing report for sign off to her PCP) : 35   minutes Signed: 
Jem Baez MD

## 2018-10-16 ENCOUNTER — OFFICE VISIT (OUTPATIENT)
Dept: CARDIOLOGY CLINIC | Age: 83
End: 2018-10-16

## 2018-10-16 VITALS
HEIGHT: 68 IN | DIASTOLIC BLOOD PRESSURE: 60 MMHG | SYSTOLIC BLOOD PRESSURE: 140 MMHG | HEART RATE: 61 BPM | RESPIRATION RATE: 18 BRPM | WEIGHT: 123.4 LBS | OXYGEN SATURATION: 97 % | BODY MASS INDEX: 18.7 KG/M2

## 2018-10-16 DIAGNOSIS — I25.119 CORONARY ARTERY DISEASE INVOLVING NATIVE HEART WITH ANGINA PECTORIS, UNSPECIFIED VESSEL OR LESION TYPE (HCC): Primary | ICD-10-CM

## 2018-10-16 DIAGNOSIS — I10 ESSENTIAL HYPERTENSION: ICD-10-CM

## 2018-10-16 DIAGNOSIS — I48.91 ATRIAL FIBRILLATION WITH RVR (HCC): ICD-10-CM

## 2018-10-16 RX ORDER — DILTIAZEM HYDROCHLORIDE 180 MG/1
180 CAPSULE, COATED, EXTENDED RELEASE ORAL DAILY
Qty: 30 CAP | Refills: 12 | Status: SHIPPED | OUTPATIENT
Start: 2018-10-16 | End: 2019-01-01 | Stop reason: SDUPTHER

## 2018-10-16 NOTE — PROGRESS NOTES
[address] [place] [address] [address], [place], [address] Marguerite Blanco is a 80 y.o. male. Subjective:  
 
Mr. Chai Mcrae was hospitalized 9/29/18-10/1/18. Today, he states he is doing well. Patient denies any exertional chest pain, dyspnea, palpitations, syncope, orthopnea, edema or paroxysmal nocturnal dyspnea. He is here today with his wife. Patient Active Problem List  
 Diagnosis Date Noted  Acute encephalopathy 09/29/2018  Atrial fibrillation with RVR ([facility] [address] 09/29/2018  Alzheimer's type dementia with late onset without behavioral disturbance 06/12/2017  Complex partial seizures with consciousness impaired ([facility] [address] 12/21/2015  Elevated troponin 07/29/2014  CAD (coronary artery disease) 07/28/2014  
 HTN (hypertension) 07/28/2014  Hyperlipidemia 07/28/2014 Anny Goldberg NP Past Medical History:  
Diagnosis Date  CAD (coronary artery disease)  Hypercholesteremia  Hypertension  Seizures ([facility] [address]   
 last seizure in 2011 per pt Past Surgical History:  
Procedure Laterality Date  CARDIAC SURG PROCEDURE UNLIST    
 cardiac stents [address]  HX CHOLECYSTECTOMY  2007  ID EGD BALLOON DILATION ESOPHAGUS <30 MM DIAM  11/21/2012 No Known Allergies Family History Problem Relation Age of Onset  Alcohol abuse Mother Social History Socioeconomic History  Marital status:  Spouse name: Not on file  Number of children: Not on file  Years of education: Not on file  Highest education level: Not on file Social Needs  Financial resource strain: Not on file  Food insecurity - worry: Not on file  Food insecurity - inability: Not on file  Transportation needs - medical: Not on file  Transportation needs - non-medical: Not on file Occupational History  Not on file Tobacco Use  Smoking status: Former Smoker Types: Cigarettes  Smokeless tobacco: Never Used  Tobacco comment: years ago- not sure when Substance and Sexual Activity  Alcohol use: No  
 Drug use: No  
 Sexual activity: Not on file Other Topics Concern  Not on file Social History Narrative  Not on file Current Outpatient Medications Medication Sig  
 dilTIAZem CD (CARDIZEM CD) 180 mg ER capsule Take 1 Cap by mouth daily.  galantamine (RAZADYNE) 8 mg SR capsule take 1 capsule by mouth daily before BREAKFAST  levETIRAcetam (KEPPRA) 750 mg tablet take 2 tablets by mouth twice a day  cyanocobalamin (VITAMIN B-12) 2,500 mcg sublingual tablet Take 5,000 mcg by mouth daily.  ASCORBATE CALCIUM (VITAMIN C PO) Take  by mouth.  Calcium-Cholecalciferol, D3, 600 mg(1,500mg) -400 unit chew Take  by mouth.  carvedilol (COREG) 3.125 mg tablet Take 1 Tab by mouth two (2) times daily (with meals).  aspirin 81 mg chewable tablet Take 81 mg by mouth nightly.  rosuvastatin (CRESTOR) 5 mg tablet Take 5 mg by mouth daily. No current facility-administered medications for this visit. Review of Systems Constitutional: Negative for fever, chills, malaise/fatigue and diaphoresis. Respiratory: Negative for cough, hemoptysis, sputum production, shortness of breath and wheezing. Cardiovascular: Negative for chest pain, palpitations, orthopnea, claudication, leg swelling and PND. Gastrointestinal: Negative for heartburn, nausea, vomiting, blood in stool and melena. Genitourinary: Negative for dysuria and flank pain. Musculoskeletal: Negative for joint pain and back pain. Skin: Negative for rash. Neurological: Negative for focal weakness, seizures, loss of consciousness, weakness and headaches. Endo/Heme/Allergies: Does not bruise/bleed easily. Psychiatric/Behavioral: Negative for memory loss. The patient does not have insomnia. Physical Exam:   
Visit Vitals /60 (BP 1 Location: Right arm, BP Patient Position: Sitting) Pulse 61 Resp 18 Ht 5' 8\" (1.727 m) Wt 123 lb 6.4 oz (56 kg) SpO2 97% BMI 18.76 kg/m² Wt Readings from Last 3 Encounters:  
10/16/18 123 lb 6.4 oz (56 kg) 09/29/18 128 lb (58.1 kg) 06/18/18 128 lb (58.1 kg) Gen: NAD Mental Status - Alert. General Appearance - Not in acute distress. Neck - no JVD Chest and Lung Exam  
Inspection: Accessory muscles - No use of accessory muscles in breathing. Auscultation:  
Breath sounds: - Normal.  
 
Cardiovascular Inspection: Jugular vein - Bilateral - Inspection Normal.  
Palpation/Percussion:  
Apical Impulse: - Normal.  
Auscultation: Rhythm - Regular. Heart Sounds - S1 WNL and S2 WNL. No S3 or S4. Murmurs & Other Heart Sounds: Auscultation of the heart reveals - No Murmurs. Peripheral Vascular Upper Extremity: Inspection - Bilateral - No Cyanotic nailbeds or Digital clubbing. Lower Extremity:  
Palpation: Edema - Bilateral - No edema. Abdomen: Soft, non-tender, bowel sounds are active. Neuro: A&O times 3, CN and motor grossly WNL Cardiographics EKG 10/16/18 - Sinus [address] Assessment:  
 
Encounter Diagnoses Name Primary?  Coronary artery disease involving native heart with angina pectoris, unspecified vessel or lesion type ([address] Yes  Essential hypertension  Atrial fibrillation with RVR ([address] Plan:  
 
Mr. Katie Sharp has a history of AF. His EKG today shows sinus bradycardia. His BP is good. No further testing is necessary at this time. Continue Diltiazem. Follow up in 6 months or PRN Written by Kaur Ellis, as dictated by Dr. Carlene Swartz.

## 2018-10-16 NOTE — PROGRESS NOTES
1. Have you been to the ER, urgent care clinic since your last visit? Hospitalized since your last visit? Yes this is a hospital f/u/ 
 
2. Have you seen or consulted any other health care providers outside of the [address] [place] [address] since your last visit? Include any pap smears or colon screening. Seen by PCP. Chief Complaint Patient presents with  
[facility] Follow Up  
  seen in hospital on 9/29/18 -Afib-pt denies any cardiac symptoms

## 2018-12-31 NOTE — PROGRESS NOTES
Date:  18     Name:  Renzo Lal.  :  1927  MRN:  [id number]     PCP:  Leopold Lick, NP    Chief Complaint   Patient presents with    Seizure    Dementia     HISTORY OF PRESENT ILLNESS: Follow-up for epilepsy and Alzheimer's type dementia. He does continue taking Keppra without any difficulties or signs or symptoms of toxicity. He did have a small seizure [place] night. His daughter indicates that she woke up to go to the bathroom and noticed that he was having the blank stare and was not initially responsive. He states that his stomach was upset the next few days. He is back to baseline now. Memory appears to be stable on Namenda XR 21 mg and Razadyne 8 mg daily. Except as noted above, denies  fever, chills, cough. No CP or SOB. No dysuria, loss of bowel or bladder control. No Weight loss. Appetite good. Sleeping well. No sweats. No edema. No bruising or bleeding. No nausea or vomit. No diarrhea. No frequency, urgency, No depressive sxs. No anxiety. Denies sore throat, nasal congestion, nasal discharge, epistaxis, tinnitus, hearing loss, back pain, muscle pain, or joint pain. Current Outpatient Medications   Medication Sig    dilTIAZem CD (CARDIZEM CD) 180 mg ER capsule Take 1 Cap by mouth daily.  galantamine (RAZADYNE) 8 mg SR capsule take 1 capsule by mouth daily before BREAKFAST    levETIRAcetam (KEPPRA) 750 mg tablet take 2 tablets by mouth twice a day    cyanocobalamin (VITAMIN B-12) 2,500 mcg sublingual tablet Take 5,000 mcg by mouth daily.  ASCORBATE CALCIUM (VITAMIN C PO) Take  by mouth.  Calcium-Cholecalciferol, D3, 600 mg(1,500mg) -400 unit chew Take  by mouth.  carvedilol (COREG) 3.125 mg tablet Take 1 Tab by mouth two (2) times daily (with meals).  aspirin 81 mg chewable tablet Take 81 mg by mouth nightly.  rosuvastatin (CRESTOR) 5 mg tablet Take 5 mg by mouth daily.      No current facility-administered medications for this visit. No Known Allergies  Past Medical History:   Diagnosis Date    CAD (coronary artery disease)     Hypercholesteremia     Hypertension     Seizures ([address]     last seizure in 2011 per pt     Past Surgical History:   Procedure Laterality Date    CARDIAC SURG PROCEDURE UNLIST      cardiac stents    HX APPENDECTOMY  1945    HX CHOLECYSTECTOMY  2007    [place] EGD BALLOON DILATION ESOPHAGUS <30 MM DIAM  11/21/2012          Social History     Socioeconomic History    Marital status:      Spouse name: Not on file    Number of children: Not on file    Years of education: Not on file    Highest education level: Not on file   Social Needs    Financial resource strain: Not on file    Food insecurity - worry: Not on file    Food insecurity - inability: Not on file   [employer] needs - medical: Not on file   [employer] needs - non-medical: Not on file   Occupational History    Not on file   Tobacco Use    Smoking status: Former Smoker     Types: Cigarettes    Smokeless tobacco: Never Used    Tobacco comment: years ago- not sure when   Substance and Sexual Activity    Alcohol use: No    Drug use: No    Sexual activity: Not on file   Other Topics Concern    Not on file   Social History Narrative    Not on file     Family History   Problem Relation Age of Onset    Alcohol abuse Mother        PHYSICAL EXAMINATION:    Visit Vitals  /60 (BP 1 Location: Right arm, BP Patient Position: Sitting)   Pulse 62   Resp 16   Ht 5' 8\" (1.727 m)   Wt 54.9 kg (121 lb)   SpO2 97%   BMI 18.40 kg/m²     General: Well defined, nourished, and groomed individual in no acute distress. Neck: Supple, nontender, no bruits, no pain with resistance to active range of motion. Heart: Regular rate and rhythm, no murmurs, rub, or gallop. Normal S1S2.    Lungs: Clear to auscultation bilaterally with equal chest expansion, no cough, no wheeze   Musculoskeletal: Extremities revealed no edema and had full range of motion of joints. Psych: Good mood and bright affect   NEUROLOGICAL EXAMINATION:   Mental Status: Alert and oriented to person, place, and time   Cranial Nerves:   II, III, IV, VI: Visual acuity grossly intact. Visual fields are normal.   Pupils are equal, round, and reactive to light and accommodation. Extra-ocular movements are full and fluid. Fundoscopic exam was benign, no ptosis or nystagmus. V-XII: Hearing is grossly intact. Facial features are symmetric, with normal sensation and strength. The palate rises symmetrically and the tongue protrudes midline. Sternocleidomastoids 5/5. Motor Examination: Normal tone, bulk, and strength, 5/5 muscle strength throughout. Coordination: No resting or intention tremor   Gait and Station: Steady while walking. No muscle wasting or fasiculations noted. Reflexes: DTRs 2+ throughout. ASSESSMENT AND PLAN    ICD-10-CM ICD-9-CM    1. Alzheimer's type dementia with late onset without behavioral disturbance G30.1 331.0 galantamine (RAZADYNE) 8 mg SR capsule    F02.80 294.10    2. Complex partial seizures with consciousness impaired (HCC) G40.209 345.40 levETIRAcetam (KEPPRA) 750 mg tablet     Dementia is actually pretty stable at this point without any behavioral disturbance. Continue with Razadyen ER and Namenda XR 21mg. His daughter did report one breakthrough seizure recently. This may have been related to an underlying GI bug based on how he felt for the few days after this. Will take a watch and wait. His daughter will call if he has additional episodes but he has been stable since he was placed on Keppra several years ago now. Follow up in six months    [address] [facility].  Ashley Stack

## 2019-01-01 ENCOUNTER — APPOINTMENT (OUTPATIENT)
Dept: CT IMAGING | Age: 84
End: 2019-01-01
Attending: EMERGENCY MEDICINE
Payer: MEDICARE

## 2019-01-01 ENCOUNTER — APPOINTMENT (OUTPATIENT)
Dept: GENERAL RADIOLOGY | Age: 84
DRG: 023 | End: 2019-01-01
Attending: INTERNAL MEDICINE
Payer: MEDICARE

## 2019-01-01 ENCOUNTER — TELEPHONE (OUTPATIENT)
Dept: NEUROLOGY | Age: 84
End: 2019-01-01

## 2019-01-01 ENCOUNTER — APPOINTMENT (OUTPATIENT)
Dept: MRI IMAGING | Age: 84
DRG: 023 | End: 2019-01-01
Attending: PSYCHIATRY & NEUROLOGY
Payer: MEDICARE

## 2019-01-01 ENCOUNTER — OFFICE VISIT (OUTPATIENT)
Dept: CARDIOLOGY CLINIC | Age: 84
End: 2019-01-01

## 2019-01-01 ENCOUNTER — HOSPITAL ENCOUNTER (EMERGENCY)
Age: 84
Discharge: OTHER INSTITUTION | End: 2019-10-25
Attending: EMERGENCY MEDICINE | Admitting: EMERGENCY MEDICINE
Payer: MEDICARE

## 2019-01-01 ENCOUNTER — APPOINTMENT (OUTPATIENT)
Dept: GENERAL RADIOLOGY | Age: 84
End: 2019-01-01
Attending: EMERGENCY MEDICINE
Payer: MEDICARE

## 2019-01-01 ENCOUNTER — HOME CARE VISIT (OUTPATIENT)
Dept: SCHEDULING | Facility: HOME HEALTH | Age: 84
End: 2019-01-01

## 2019-01-01 ENCOUNTER — APPOINTMENT (OUTPATIENT)
Dept: GENERAL RADIOLOGY | Age: 84
DRG: 023 | End: 2019-01-01
Attending: HOSPITALIST
Payer: MEDICARE

## 2019-01-01 ENCOUNTER — HOSPITAL ENCOUNTER (INPATIENT)
Age: 84
LOS: 2 days | Discharge: HOSPICE | DRG: 023 | End: 2019-10-27
Attending: INTERNAL MEDICINE | Admitting: INTERNAL MEDICINE
Payer: MEDICARE

## 2019-01-01 ENCOUNTER — ANESTHESIA EVENT (OUTPATIENT)
Dept: INTERVENTIONAL RADIOLOGY/VASCULAR | Age: 84
DRG: 023 | End: 2019-01-01
Payer: MEDICARE

## 2019-01-01 ENCOUNTER — APPOINTMENT (OUTPATIENT)
Dept: CT IMAGING | Age: 84
DRG: 023 | End: 2019-01-01
Attending: RADIOLOGY
Payer: MEDICARE

## 2019-01-01 ENCOUNTER — APPOINTMENT (OUTPATIENT)
Dept: INTERVENTIONAL RADIOLOGY/VASCULAR | Age: 84
DRG: 023 | End: 2019-01-01
Attending: RADIOLOGY
Payer: MEDICARE

## 2019-01-01 ENCOUNTER — APPOINTMENT (OUTPATIENT)
Dept: CT IMAGING | Age: 84
DRG: 023 | End: 2019-01-01
Attending: NURSE PRACTITIONER
Payer: MEDICARE

## 2019-01-01 ENCOUNTER — ANESTHESIA (OUTPATIENT)
Dept: INTERVENTIONAL RADIOLOGY/VASCULAR | Age: 84
DRG: 023 | End: 2019-01-01
Payer: MEDICARE

## 2019-01-01 ENCOUNTER — HOSPITAL ENCOUNTER (EMERGENCY)
Age: 84
Discharge: HOME | End: 2019-10-24
Attending: EMERGENCY MEDICINE
Payer: MEDICARE

## 2019-01-01 ENCOUNTER — OFFICE VISIT (OUTPATIENT)
Dept: NEUROLOGY | Age: 84
End: 2019-01-01

## 2019-01-01 ENCOUNTER — HOSPICE ADMISSION (OUTPATIENT)
Dept: HOSPICE | Facility: HOSPICE | Age: 84
End: 2019-01-01
Payer: MEDICARE

## 2019-01-01 ENCOUNTER — HOSPITAL ENCOUNTER (INPATIENT)
Age: 84
LOS: 2 days | DRG: 951 | End: 2019-10-29
Attending: INTERNAL MEDICINE | Admitting: INTERNAL MEDICINE
Payer: OTHER MISCELLANEOUS

## 2019-01-01 ENCOUNTER — APPOINTMENT (OUTPATIENT)
Dept: NON INVASIVE DIAGNOSTICS | Age: 84
DRG: 023 | End: 2019-01-01
Attending: PSYCHIATRY & NEUROLOGY
Payer: MEDICARE

## 2019-01-01 VITALS
SYSTOLIC BLOOD PRESSURE: 138 MMHG | DIASTOLIC BLOOD PRESSURE: 62 MMHG | RESPIRATION RATE: 20 BRPM | BODY MASS INDEX: 18.76 KG/M2 | HEIGHT: 68 IN

## 2019-01-01 VITALS
HEIGHT: 68 IN | SYSTOLIC BLOOD PRESSURE: 120 MMHG | RESPIRATION RATE: 28 BRPM | BODY MASS INDEX: 17.04 KG/M2 | OXYGEN SATURATION: 95 % | WEIGHT: 112.43 LBS | TEMPERATURE: 99.1 F | DIASTOLIC BLOOD PRESSURE: 62 MMHG | HEART RATE: 79 BPM

## 2019-01-01 VITALS
SYSTOLIC BLOOD PRESSURE: 167 MMHG | HEART RATE: 65 BPM | DIASTOLIC BLOOD PRESSURE: 60 MMHG | RESPIRATION RATE: 22 BRPM | OXYGEN SATURATION: 95 % | TEMPERATURE: 98.9 F

## 2019-01-01 VITALS
TEMPERATURE: 97.4 F | HEART RATE: 137 BPM | RESPIRATION RATE: 18 BRPM | OXYGEN SATURATION: 76 % | DIASTOLIC BLOOD PRESSURE: 65 MMHG | SYSTOLIC BLOOD PRESSURE: 103 MMHG

## 2019-01-01 VITALS
WEIGHT: 123.4 LBS | SYSTOLIC BLOOD PRESSURE: 142 MMHG | DIASTOLIC BLOOD PRESSURE: 62 MMHG | HEART RATE: 56 BPM | BODY MASS INDEX: 18.7 KG/M2 | HEIGHT: 68 IN | OXYGEN SATURATION: 97 % | RESPIRATION RATE: 16 BRPM

## 2019-01-01 VITALS
OXYGEN SATURATION: 97 % | TEMPERATURE: 98.1 F | BODY MASS INDEX: 18.77 KG/M2 | WEIGHT: 123.46 LBS | RESPIRATION RATE: 17 BRPM | SYSTOLIC BLOOD PRESSURE: 160 MMHG | DIASTOLIC BLOOD PRESSURE: 57 MMHG | HEART RATE: 60 BPM

## 2019-01-01 VITALS
HEIGHT: 68 IN | RESPIRATION RATE: 16 BRPM | WEIGHT: 124 LBS | HEART RATE: 55 BPM | DIASTOLIC BLOOD PRESSURE: 78 MMHG | BODY MASS INDEX: 18.79 KG/M2 | SYSTOLIC BLOOD PRESSURE: 160 MMHG | OXYGEN SATURATION: 97 %

## 2019-01-01 DIAGNOSIS — I63.9 CEREBROVASCULAR ACCIDENT (CVA), UNSPECIFIED MECHANISM (HCC): Primary | ICD-10-CM

## 2019-01-01 DIAGNOSIS — G40.209 COMPLEX PARTIAL SEIZURES WITH CONSCIOUSNESS IMPAIRED (HCC): ICD-10-CM

## 2019-01-01 DIAGNOSIS — F02.80 ALZHEIMER'S TYPE DEMENTIA WITH LATE ONSET WITHOUT BEHAVIORAL DISTURBANCE (HCC): ICD-10-CM

## 2019-01-01 DIAGNOSIS — I25.119 CORONARY ARTERY DISEASE INVOLVING NATIVE HEART WITH ANGINA PECTORIS, UNSPECIFIED VESSEL OR LESION TYPE (HCC): ICD-10-CM

## 2019-01-01 DIAGNOSIS — G30.1 ALZHEIMER'S TYPE DEMENTIA WITH LATE ONSET WITHOUT BEHAVIORAL DISTURBANCE (HCC): ICD-10-CM

## 2019-01-01 DIAGNOSIS — R40.4 TRANSIENT ALTERATION OF AWARENESS: ICD-10-CM

## 2019-01-01 DIAGNOSIS — I44.0 1ST DEGREE AV BLOCK: ICD-10-CM

## 2019-01-01 DIAGNOSIS — R41.82 ALTERED MENTAL STATUS, UNSPECIFIED ALTERED MENTAL STATUS TYPE: ICD-10-CM

## 2019-01-01 DIAGNOSIS — I63.9 ACUTE EMBOLIC STROKE (HCC): Primary | ICD-10-CM

## 2019-01-01 DIAGNOSIS — I10 ESSENTIAL HYPERTENSION: ICD-10-CM

## 2019-01-01 DIAGNOSIS — I10 ESSENTIAL HYPERTENSION: Primary | ICD-10-CM

## 2019-01-01 DIAGNOSIS — R56.9 SEIZURE (HCC): Primary | ICD-10-CM

## 2019-01-01 DIAGNOSIS — E78.2 MIXED HYPERLIPIDEMIA: Primary | ICD-10-CM

## 2019-01-01 DIAGNOSIS — I63.512 ACUTE ISCHEMIC LEFT MCA STROKE (HCC): ICD-10-CM

## 2019-01-01 DIAGNOSIS — I48.91 ATRIAL FIBRILLATION WITH RVR (HCC): ICD-10-CM

## 2019-01-01 LAB
ALBUMIN SERPL-MCNC: 3.4 G/DL (ref 3.5–5)
ALBUMIN SERPL-MCNC: 3.5 G/DL (ref 3.5–5)
ALBUMIN/GLOB SERPL: 0.9 {RATIO} (ref 1.1–2.2)
ALBUMIN/GLOB SERPL: 1 {RATIO} (ref 1.1–2.2)
ALP SERPL-CCNC: 103 U/L (ref 45–117)
ALP SERPL-CCNC: 97 U/L (ref 45–117)
ALT SERPL-CCNC: 16 U/L (ref 12–78)
ALT SERPL-CCNC: 20 U/L (ref 12–78)
ANION GAP SERPL CALC-SCNC: 11 MMOL/L (ref 5–15)
ANION GAP SERPL CALC-SCNC: 5 MMOL/L (ref 5–15)
ANION GAP SERPL CALC-SCNC: 7 MMOL/L (ref 5–15)
APPEARANCE UR: CLEAR
APTT PPP: 25.4 SEC (ref 22.1–32)
ASPIRIN TEST, ASPIRN: 477 ARU
AST SERPL-CCNC: 12 U/L (ref 15–37)
AST SERPL-CCNC: 16 U/L (ref 15–37)
ATRIAL RATE: 59 BPM
ATRIAL RATE: 89 BPM
AV PEAK GRADIENT: 52.9 MMHG
AV VELOCITY RATIO: 0.49
AV VTI RATIO: 0.5
BACTERIA URNS QL MICRO: NEGATIVE /HPF
BASOPHILS # BLD: 0 K/UL (ref 0–0.1)
BASOPHILS # BLD: 0.1 K/UL (ref 0–0.1)
BASOPHILS NFR BLD: 1 % (ref 0–1)
BASOPHILS NFR BLD: 1 % (ref 0–1)
BILIRUB SERPL-MCNC: 0.3 MG/DL (ref 0.2–1)
BILIRUB SERPL-MCNC: 0.6 MG/DL (ref 0.2–1)
BILIRUB UR QL: NEGATIVE
BUN SERPL-MCNC: 24 MG/DL (ref 6–20)
BUN SERPL-MCNC: 25 MG/DL (ref 6–20)
BUN SERPL-MCNC: 27 MG/DL (ref 6–20)
BUN/CREAT SERPL: 22 (ref 12–20)
BUN/CREAT SERPL: 26 (ref 12–20)
BUN/CREAT SERPL: 29 (ref 12–20)
CALCIUM SERPL-MCNC: 8.5 MG/DL (ref 8.5–10.1)
CALCIUM SERPL-MCNC: 8.7 MG/DL (ref 8.5–10.1)
CALCIUM SERPL-MCNC: 8.7 MG/DL (ref 8.5–10.1)
CALCULATED P AXIS, ECG09: 19 DEGREES
CALCULATED P AXIS, ECG09: 67 DEGREES
CALCULATED R AXIS, ECG10: -10 DEGREES
CALCULATED R AXIS, ECG10: 14 DEGREES
CALCULATED T AXIS, ECG11: -162 DEGREES
CALCULATED T AXIS, ECG11: 11 DEGREES
CHLORIDE SERPL-SCNC: 101 MMOL/L (ref 97–108)
CHLORIDE SERPL-SCNC: 104 MMOL/L (ref 97–108)
CHLORIDE SERPL-SCNC: 109 MMOL/L (ref 97–108)
CHOLEST SERPL-MCNC: 127 MG/DL
CO2 SERPL-SCNC: 20 MMOL/L (ref 21–32)
CO2 SERPL-SCNC: 27 MMOL/L (ref 21–32)
CO2 SERPL-SCNC: 27 MMOL/L (ref 21–32)
COLOR UR: NORMAL
COMMENT, HOLDF: NORMAL
CREAT SERPL-MCNC: 0.82 MG/DL (ref 0.7–1.3)
CREAT SERPL-MCNC: 1.05 MG/DL (ref 0.7–1.3)
CREAT SERPL-MCNC: 1.16 MG/DL (ref 0.7–1.3)
DIAGNOSIS, 93000: NORMAL
DIAGNOSIS, 93000: NORMAL
DIFFERENTIAL METHOD BLD: ABNORMAL
DIFFERENTIAL METHOD BLD: ABNORMAL
ECHO AO ROOT DIAM: 3.36 CM
ECHO AV AREA PEAK VELOCITY: 1.7 CM2
ECHO AV AREA VTI: 1.7 CM2
ECHO AV CUSP MM: 1.64 CM
ECHO AV MEAN GRADIENT: 7.2 MMHG
ECHO AV MEAN VELOCITY: 1.25 M/S
ECHO AV PEAK GRADIENT: 12.4 MMHG
ECHO AV PEAK VELOCITY: 176.22 CM/S
ECHO AV REGURGITANT PHT: 512.2 CM
ECHO AV VTI: 45.86 CM
ECHO LA AREA 4C: 21.4 CM2
ECHO LA MAJOR AXIS: 3.55 CM
ECHO LA TO AORTIC ROOT RATIO: 1.06
ECHO LA VOL 2C: 55.93 ML (ref 18–58)
ECHO LA VOL 4C: 69.27 ML (ref 18–58)
ECHO LA VOL BP: 72.57 ML (ref 18–58)
ECHO LA VOL/BSA BIPLANE: 45.35 ML/M2 (ref 16–28)
ECHO LA VOLUME INDEX A2C: 34.95 ML/M2 (ref 16–28)
ECHO LA VOLUME INDEX A4C: 43.29 ML/M2 (ref 16–28)
ECHO LV E' LATERAL VELOCITY: 8.77 CM/S
ECHO LV E' SEPTAL VELOCITY: 4.96 CM/S
ECHO LV INTERNAL DIMENSION DIASTOLIC: 4.77 CM (ref 4.2–5.9)
ECHO LV INTERNAL DIMENSION SYSTOLIC: 3.23 CM
ECHO LV IVSD: 0.79 CM (ref 0.6–1)
ECHO LV MASS 2D: 145.9 G (ref 88–224)
ECHO LV MASS INDEX 2D: 91.2 G/M2 (ref 49–115)
ECHO LV POSTERIOR WALL DIASTOLIC: 0.84 CM (ref 0.6–1)
ECHO LVOT DIAM: 2.12 CM
ECHO LVOT PEAK GRADIENT: 3 MMHG
ECHO LVOT PEAK VELOCITY: 86.05 CM/S
ECHO LVOT SV: 78.6 ML
ECHO LVOT VTI: 22.26 CM
ECHO MV A VELOCITY: 139.79 CM/S
ECHO MV AREA PHT: 3.5 CM2
ECHO MV E DECELERATION TIME (DT): 216.1 MS
ECHO MV E VELOCITY: 106.66 CM/S
ECHO MV E/A RATIO: 0.76
ECHO MV E/E' LATERAL: 12.16
ECHO MV E/E' RATIO (AVERAGED): 16.83
ECHO MV E/E' SEPTAL: 21.5
ECHO MV PRESSURE HALF TIME (PHT): 62.7 MS
ECHO PV MAX VELOCITY: 116.83 CM/S
ECHO PV PEAK GRADIENT: 5.5 MMHG
ECHO RV INTERNAL DIMENSION: 2.37 CM
ECHO RV TAPSE: 1.73 CM (ref 1.5–2)
ECHO TV REGURGITANT MAX VELOCITY: 291.69 CM/S
ECHO TV REGURGITANT PEAK GRADIENT: 34 MMHG
EOSINOPHIL # BLD: 0.2 K/UL (ref 0–0.4)
EOSINOPHIL # BLD: 0.3 K/UL (ref 0–0.4)
EOSINOPHIL NFR BLD: 3 % (ref 0–7)
EOSINOPHIL NFR BLD: 4 % (ref 0–7)
EPITH CASTS URNS QL MICRO: NORMAL /LPF
ERYTHROCYTE [DISTWIDTH] IN BLOOD BY AUTOMATED COUNT: 12.2 % (ref 11.5–14.5)
ERYTHROCYTE [DISTWIDTH] IN BLOOD BY AUTOMATED COUNT: 12.3 % (ref 11.5–14.5)
ERYTHROCYTE [DISTWIDTH] IN BLOOD BY AUTOMATED COUNT: 12.4 % (ref 11.5–14.5)
ETHANOL SERPL-MCNC: <10 MG/DL
GLOBULIN SER CALC-MCNC: 3.4 G/DL (ref 2–4)
GLOBULIN SER CALC-MCNC: 3.8 G/DL (ref 2–4)
GLUCOSE BLD STRIP.AUTO-MCNC: 102 MG/DL (ref 65–100)
GLUCOSE BLD STRIP.AUTO-MCNC: 117 MG/DL (ref 65–100)
GLUCOSE SERPL-MCNC: 105 MG/DL (ref 65–100)
GLUCOSE SERPL-MCNC: 93 MG/DL (ref 65–100)
GLUCOSE SERPL-MCNC: 96 MG/DL (ref 65–100)
GLUCOSE UR STRIP.AUTO-MCNC: NEGATIVE MG/DL
HCT VFR BLD AUTO: 33.9 % (ref 36.6–50.3)
HCT VFR BLD AUTO: 34.9 % (ref 36.6–50.3)
HCT VFR BLD AUTO: 36.7 % (ref 36.6–50.3)
HDLC SERPL-MCNC: 54 MG/DL
HDLC SERPL: 2.4 {RATIO} (ref 0–5)
HGB BLD-MCNC: 11.3 G/DL (ref 12.1–17)
HGB BLD-MCNC: 11.6 G/DL (ref 12.1–17)
HGB BLD-MCNC: 11.9 G/DL (ref 12.1–17)
HGB UR QL STRIP: NEGATIVE
HYALINE CASTS URNS QL MICRO: NORMAL /LPF (ref 0–5)
IMM GRANULOCYTES # BLD AUTO: 0 K/UL (ref 0–0.04)
IMM GRANULOCYTES # BLD AUTO: 0 K/UL (ref 0–0.04)
IMM GRANULOCYTES NFR BLD AUTO: 0 % (ref 0–0.5)
IMM GRANULOCYTES NFR BLD AUTO: 0 % (ref 0–0.5)
INR PPP: 1 (ref 0.9–1.1)
KETONES UR QL STRIP.AUTO: NEGATIVE MG/DL
LDLC SERPL CALC-MCNC: 64.2 MG/DL (ref 0–100)
LEUKOCYTE ESTERASE UR QL STRIP.AUTO: NEGATIVE
LIPID PROFILE,FLP: NORMAL
LVFS 2D: 32.24 %
LVOT MG: 1.56 MMHG
LVOT MV: 0.57 CM/S
LYMPHOCYTES # BLD: 1.6 K/UL (ref 0.8–3.5)
LYMPHOCYTES # BLD: 1.7 K/UL (ref 0.8–3.5)
LYMPHOCYTES NFR BLD: 22 % (ref 12–49)
LYMPHOCYTES NFR BLD: 23 % (ref 12–49)
MAGNESIUM SERPL-MCNC: 2 MG/DL (ref 1.6–2.4)
MCH RBC QN AUTO: 33.1 PG (ref 26–34)
MCH RBC QN AUTO: 33.6 PG (ref 26–34)
MCH RBC QN AUTO: 33.6 PG (ref 26–34)
MCHC RBC AUTO-ENTMCNC: 32.4 G/DL (ref 30–36.5)
MCHC RBC AUTO-ENTMCNC: 33.2 G/DL (ref 30–36.5)
MCHC RBC AUTO-ENTMCNC: 33.3 G/DL (ref 30–36.5)
MCV RBC AUTO: 100.9 FL (ref 80–99)
MCV RBC AUTO: 101.2 FL (ref 80–99)
MCV RBC AUTO: 102.2 FL (ref 80–99)
MONOCYTES # BLD: 0.9 K/UL (ref 0–1)
MONOCYTES # BLD: 1 K/UL (ref 0–1)
MONOCYTES NFR BLD: 12 % (ref 5–13)
MONOCYTES NFR BLD: 14 % (ref 5–13)
MV DEC SLOPE: 4.94
NEUTS SEG # BLD: 4.3 K/UL (ref 1.8–8)
NEUTS SEG # BLD: 4.5 K/UL (ref 1.8–8)
NEUTS SEG NFR BLD: 58 % (ref 32–75)
NEUTS SEG NFR BLD: 62 % (ref 32–75)
NITRITE UR QL STRIP.AUTO: NEGATIVE
NRBC # BLD: 0 K/UL (ref 0–0.01)
NRBC BLD-RTO: 0 PER 100 WBC
P-R INTERVAL, ECG05: 236 MS
P-R INTERVAL, ECG05: 260 MS
PH UR STRIP: 7 [PH] (ref 5–8)
PHOSPHATE SERPL-MCNC: 3 MG/DL (ref 2.6–4.7)
PISA AR MAX VEL: 363.66 CM/S
PLATELET # BLD AUTO: 158 K/UL (ref 150–400)
PLATELET # BLD AUTO: 163 K/UL (ref 150–400)
PLATELET # BLD AUTO: 177 K/UL (ref 150–400)
PMV BLD AUTO: 10.4 FL (ref 8.9–12.9)
PMV BLD AUTO: 10.9 FL (ref 8.9–12.9)
PMV BLD AUTO: 10.9 FL (ref 8.9–12.9)
POTASSIUM SERPL-SCNC: 3.6 MMOL/L (ref 3.5–5.1)
POTASSIUM SERPL-SCNC: 3.7 MMOL/L (ref 3.5–5.1)
POTASSIUM SERPL-SCNC: 4.1 MMOL/L (ref 3.5–5.1)
PROT SERPL-MCNC: 6.8 G/DL (ref 6.4–8.2)
PROT SERPL-MCNC: 7.3 G/DL (ref 6.4–8.2)
PROT UR STRIP-MCNC: NEGATIVE MG/DL
PROTHROMBIN TIME: 10.4 SEC (ref 9–11.1)
Q-T INTERVAL, ECG07: 340 MS
Q-T INTERVAL, ECG07: 440 MS
QRS DURATION, ECG06: 88 MS
QRS DURATION, ECG06: 92 MS
QTC CALCULATION (BEZET), ECG08: 413 MS
QTC CALCULATION (BEZET), ECG08: 435 MS
RBC # BLD AUTO: 3.36 M/UL (ref 4.1–5.7)
RBC # BLD AUTO: 3.45 M/UL (ref 4.1–5.7)
RBC # BLD AUTO: 3.59 M/UL (ref 4.1–5.7)
RBC #/AREA URNS HPF: NORMAL /HPF (ref 0–5)
SAMPLES BEING HELD,HOLD: NORMAL
SERVICE CMNT-IMP: ABNORMAL
SERVICE CMNT-IMP: ABNORMAL
SODIUM SERPL-SCNC: 135 MMOL/L (ref 136–145)
SODIUM SERPL-SCNC: 136 MMOL/L (ref 136–145)
SODIUM SERPL-SCNC: 140 MMOL/L (ref 136–145)
SP GR UR REFRACTOMETRY: 1.01 (ref 1–1.03)
THERAPEUTIC RANGE,PTTT: NORMAL SECS (ref 58–77)
TRIGL SERPL-MCNC: 44 MG/DL (ref ?–150)
TROPONIN I SERPL-MCNC: 12.4 NG/ML
TROPONIN I SERPL-MCNC: 2.1 NG/ML
TROPONIN I SERPL-MCNC: 8.21 NG/ML
UA: UC IF INDICATED,UAUC: NORMAL
UROBILINOGEN UR QL STRIP.AUTO: 0.2 EU/DL (ref 0.2–1)
VENTRICULAR RATE, ECG03: 59 BPM
VENTRICULAR RATE, ECG03: 89 BPM
VLDLC SERPL CALC-MCNC: 8.8 MG/DL
WBC # BLD AUTO: 11.7 K/UL (ref 4.1–11.1)
WBC # BLD AUTO: 7.3 K/UL (ref 4.1–11.1)
WBC # BLD AUTO: 7.3 K/UL (ref 4.1–11.1)
WBC URNS QL MICRO: NORMAL /HPF (ref 0–4)

## 2019-01-01 PROCEDURE — 3336500001 HSPC ELECTION

## 2019-01-01 PROCEDURE — 74011250636 HC RX REV CODE- 250/636: Performed by: INTERNAL MEDICINE

## 2019-01-01 PROCEDURE — 74011000258 HC RX REV CODE- 258: Performed by: NURSE PRACTITIONER

## 2019-01-01 PROCEDURE — 74011636320 HC RX REV CODE- 636/320: Performed by: EMERGENCY MEDICINE

## 2019-01-01 PROCEDURE — 74011000250 HC RX REV CODE- 250: Performed by: INTERNAL MEDICINE

## 2019-01-01 PROCEDURE — 80053 COMPREHEN METABOLIC PANEL: CPT

## 2019-01-01 PROCEDURE — 81001 URINALYSIS AUTO W/SCOPE: CPT

## 2019-01-01 PROCEDURE — 74011250636 HC RX REV CODE- 250/636: Performed by: HOSPITALIST

## 2019-01-01 PROCEDURE — 74018 RADEX ABDOMEN 1 VIEW: CPT

## 2019-01-01 PROCEDURE — 74011000258 HC RX REV CODE- 258: Performed by: PSYCHIATRY & NEUROLOGY

## 2019-01-01 PROCEDURE — C1894 INTRO/SHEATH, NON-LASER: HCPCS

## 2019-01-01 PROCEDURE — 83735 ASSAY OF MAGNESIUM: CPT

## 2019-01-01 PROCEDURE — 36415 COLL VENOUS BLD VENIPUNCTURE: CPT

## 2019-01-01 PROCEDURE — 76060000034 HC ANESTHESIA 1.5 TO 2 HR

## 2019-01-01 PROCEDURE — 80048 BASIC METABOLIC PNL TOTAL CA: CPT

## 2019-01-01 PROCEDURE — 93005 ELECTROCARDIOGRAM TRACING: CPT

## 2019-01-01 PROCEDURE — 74011250637 HC RX REV CODE- 250/637: Performed by: HOSPITALIST

## 2019-01-01 PROCEDURE — 84484 ASSAY OF TROPONIN QUANT: CPT

## 2019-01-01 PROCEDURE — 74011000250 HC RX REV CODE- 250: Performed by: NURSE PRACTITIONER

## 2019-01-01 PROCEDURE — 82962 GLUCOSE BLOOD TEST: CPT

## 2019-01-01 PROCEDURE — 74011250637 HC RX REV CODE- 250/637: Performed by: INTERNAL MEDICINE

## 2019-01-01 PROCEDURE — C1887 CATHETER, GUIDING: HCPCS

## 2019-01-01 PROCEDURE — 77030008771 HC TU NG SALEM SUMP -A

## 2019-01-01 PROCEDURE — 74011250636 HC RX REV CODE- 250/636: Performed by: NURSE PRACTITIONER

## 2019-01-01 PROCEDURE — 74011636320 HC RX REV CODE- 636/320: Performed by: INTERNAL MEDICINE

## 2019-01-01 PROCEDURE — 85730 THROMBOPLASTIN TIME PARTIAL: CPT

## 2019-01-01 PROCEDURE — 70450 CT HEAD/BRAIN W/O DYE: CPT

## 2019-01-01 PROCEDURE — 65270000029 HC RM PRIVATE

## 2019-01-01 PROCEDURE — 85025 COMPLETE CBC W/AUTO DIFF WBC: CPT

## 2019-01-01 PROCEDURE — 77030031515

## 2019-01-01 PROCEDURE — 03CG3ZZ EXTIRPATION OF MATTER FROM INTRACRANIAL ARTERY, PERCUTANEOUS APPROACH: ICD-10-PCS | Performed by: RADIOLOGY

## 2019-01-01 PROCEDURE — 85027 COMPLETE CBC AUTOMATED: CPT

## 2019-01-01 PROCEDURE — 74011250637 HC RX REV CODE- 250/637: Performed by: NURSE PRACTITIONER

## 2019-01-01 PROCEDURE — C9113 INJ PANTOPRAZOLE SODIUM, VIA: HCPCS | Performed by: HOSPITALIST

## 2019-01-01 PROCEDURE — 74011000258 HC RX REV CODE- 258: Performed by: INTERNAL MEDICINE

## 2019-01-01 PROCEDURE — 70551 MRI BRAIN STEM W/O DYE: CPT

## 2019-01-01 PROCEDURE — C1760 CLOSURE DEV, VASC: HCPCS

## 2019-01-01 PROCEDURE — C1769 GUIDE WIRE: HCPCS

## 2019-01-01 PROCEDURE — 70496 CT ANGIOGRAPHY HEAD: CPT

## 2019-01-01 PROCEDURE — 70544 MR ANGIOGRAPHY HEAD W/O DYE: CPT

## 2019-01-01 PROCEDURE — 77030004950 HC CATH ENTRL NG COVD -A

## 2019-01-01 PROCEDURE — 0656 HSPC GENERAL INPATIENT

## 2019-01-01 PROCEDURE — 77030034696 HC CATH URETH FOL 2W BARD -A

## 2019-01-01 PROCEDURE — 74011250636 HC RX REV CODE- 250/636: Performed by: PSYCHIATRY & NEUROLOGY

## 2019-01-01 PROCEDURE — 99285 EMERGENCY DEPT VISIT HI MDM: CPT

## 2019-01-01 PROCEDURE — 77030029527 HC CATH MIC DEL VELOC PENU -G

## 2019-01-01 PROCEDURE — 84100 ASSAY OF PHOSPHORUS: CPT

## 2019-01-01 PROCEDURE — 61645 PERQ ART M-THROMBECT &/NFS: CPT

## 2019-01-01 PROCEDURE — 74011000250 HC RX REV CODE- 250: Performed by: HOSPITALIST

## 2019-01-01 PROCEDURE — 74011636320 HC RX REV CODE- 636/320: Performed by: RADIOLOGY

## 2019-01-01 PROCEDURE — 77030035268

## 2019-01-01 PROCEDURE — 85576 BLOOD PLATELET AGGREGATION: CPT

## 2019-01-01 PROCEDURE — 93306 TTE W/DOPPLER COMPLETE: CPT

## 2019-01-01 PROCEDURE — 85610 PROTHROMBIN TIME: CPT

## 2019-01-01 PROCEDURE — 74011250637 HC RX REV CODE- 250/637: Performed by: RADIOLOGY

## 2019-01-01 PROCEDURE — G0299 HHS/HOSPICE OF RN EA 15 MIN: HCPCS

## 2019-01-01 PROCEDURE — 77030008638 HC TU CONN COOK -A

## 2019-01-01 PROCEDURE — 71045 X-RAY EXAM CHEST 1 VIEW: CPT

## 2019-01-01 PROCEDURE — 80061 LIPID PANEL: CPT

## 2019-01-01 PROCEDURE — 0042T CT PERF W CBF: CPT

## 2019-01-01 PROCEDURE — 65610000006 HC RM INTENSIVE CARE

## 2019-01-01 PROCEDURE — 96374 THER/PROPH/DIAG INJ IV PUSH: CPT

## 2019-01-01 PROCEDURE — 95816 EEG AWAKE AND DROWSY: CPT | Performed by: PSYCHIATRY & NEUROLOGY

## 2019-01-01 PROCEDURE — 74011250636 HC RX REV CODE- 250/636: Performed by: RADIOLOGY

## 2019-01-01 PROCEDURE — 77030012468 HC VLV BLEEDBK CNTRL ABBT -B

## 2019-01-01 PROCEDURE — 80307 DRUG TEST PRSMV CHEM ANLYZR: CPT

## 2019-01-01 PROCEDURE — 99284 EMERGENCY DEPT VISIT MOD MDM: CPT

## 2019-01-01 PROCEDURE — 77030037390 HC CATH ANGI PERF HIFLW ASPR PENU -K1

## 2019-01-01 RX ORDER — ATROPINE SULFATE 10 MG/ML
3 SOLUTION/ DROPS OPHTHALMIC 4 TIMES DAILY
Status: DISCONTINUED | OUTPATIENT
Start: 2019-01-01 | End: 2019-10-30 | Stop reason: HOSPADM

## 2019-01-01 RX ORDER — VERAPAMIL HYDROCHLORIDE 2.5 MG/ML
INJECTION, SOLUTION INTRAVENOUS
Status: DISCONTINUED
Start: 2019-01-01 | End: 2019-01-01 | Stop reason: WASHOUT

## 2019-01-01 RX ORDER — LORAZEPAM 2 MG/ML
1 CONCENTRATE ORAL EVERY 4 HOURS
Status: DISCONTINUED | OUTPATIENT
Start: 2019-01-01 | End: 2019-10-30 | Stop reason: HOSPADM

## 2019-01-01 RX ORDER — SODIUM CHLORIDE 9 MG/ML
INJECTION INTRAMUSCULAR; INTRAVENOUS; SUBCUTANEOUS
Status: DISPENSED
Start: 2019-01-01 | End: 2019-01-01

## 2019-01-01 RX ORDER — HYDRALAZINE HYDROCHLORIDE 20 MG/ML
20 INJECTION INTRAMUSCULAR; INTRAVENOUS
Status: DISCONTINUED | OUTPATIENT
Start: 2019-01-01 | End: 2019-01-01

## 2019-01-01 RX ORDER — LORAZEPAM 2 MG/ML
0.5 CONCENTRATE ORAL
Status: DISCONTINUED | OUTPATIENT
Start: 2019-01-01 | End: 2019-10-30 | Stop reason: HOSPADM

## 2019-01-01 RX ORDER — LEVETIRACETAM 500 MG/1
TABLET ORAL
Qty: 180 TAB | Refills: 2 | Status: SHIPPED | OUTPATIENT
Start: 2019-01-01 | End: 2019-01-01 | Stop reason: SDUPTHER

## 2019-01-01 RX ORDER — GALANTAMINE HYDROBROMIDE 8 MG/1
CAPSULE, EXTENDED RELEASE ORAL
Qty: 90 CAP | Refills: 1 | Status: SHIPPED | OUTPATIENT
Start: 2019-01-01

## 2019-01-01 RX ORDER — METOPROLOL TARTRATE 5 MG/5ML
2.5 INJECTION INTRAVENOUS
Status: DISCONTINUED | OUTPATIENT
Start: 2019-01-01 | End: 2019-01-01

## 2019-01-01 RX ORDER — SODIUM CHLORIDE 0.9 % (FLUSH) 0.9 %
5-40 SYRINGE (ML) INJECTION EVERY 8 HOURS
Status: DISCONTINUED | OUTPATIENT
Start: 2019-01-01 | End: 2019-01-01 | Stop reason: HOSPADM

## 2019-01-01 RX ORDER — ONDANSETRON 2 MG/ML
4 INJECTION INTRAMUSCULAR; INTRAVENOUS
Status: DISCONTINUED | OUTPATIENT
Start: 2019-01-01 | End: 2019-01-01 | Stop reason: HOSPADM

## 2019-01-01 RX ORDER — SODIUM CHLORIDE 0.9 % (FLUSH) 0.9 %
5-40 SYRINGE (ML) INJECTION AS NEEDED
Status: DISCONTINUED | OUTPATIENT
Start: 2019-01-01 | End: 2019-01-01 | Stop reason: HOSPADM

## 2019-01-01 RX ORDER — FENTANYL CITRATE 50 UG/ML
INJECTION, SOLUTION INTRAMUSCULAR; INTRAVENOUS
Status: COMPLETED
Start: 2019-01-01 | End: 2019-01-01

## 2019-01-01 RX ORDER — ONDANSETRON 2 MG/ML
INJECTION INTRAMUSCULAR; INTRAVENOUS AS NEEDED
Status: DISCONTINUED | OUTPATIENT
Start: 2019-01-01 | End: 2019-01-01 | Stop reason: HOSPADM

## 2019-01-01 RX ORDER — SODIUM CHLORIDE, SODIUM LACTATE, POTASSIUM CHLORIDE, CALCIUM CHLORIDE 600; 310; 30; 20 MG/100ML; MG/100ML; MG/100ML; MG/100ML
75 INJECTION, SOLUTION INTRAVENOUS CONTINUOUS
Status: DISCONTINUED | OUTPATIENT
Start: 2019-01-01 | End: 2019-01-01 | Stop reason: HOSPADM

## 2019-01-01 RX ORDER — ASPIRIN 300 MG/1
SUPPOSITORY RECTAL
Status: DISPENSED
Start: 2019-01-01 | End: 2019-01-01

## 2019-01-01 RX ORDER — ACETAMINOPHEN 650 MG/1
650 SUPPOSITORY RECTAL
Status: DISCONTINUED | OUTPATIENT
Start: 2019-01-01 | End: 2019-10-30 | Stop reason: HOSPADM

## 2019-01-01 RX ORDER — MORPHINE SULFATE ORAL SOLUTION 10 MG/5ML
10 SOLUTION ORAL EVERY 4 HOURS
Status: DISCONTINUED | OUTPATIENT
Start: 2019-01-01 | End: 2019-01-01

## 2019-01-01 RX ORDER — SODIUM CHLORIDE, SODIUM LACTATE, POTASSIUM CHLORIDE, CALCIUM CHLORIDE 600; 310; 30; 20 MG/100ML; MG/100ML; MG/100ML; MG/100ML
INJECTION, SOLUTION INTRAVENOUS
Status: DISCONTINUED | OUTPATIENT
Start: 2019-01-01 | End: 2019-01-01

## 2019-01-01 RX ORDER — PROPOFOL 10 MG/ML
INJECTION, EMULSION INTRAVENOUS AS NEEDED
Status: DISCONTINUED | OUTPATIENT
Start: 2019-01-01 | End: 2019-01-01 | Stop reason: HOSPADM

## 2019-01-01 RX ORDER — FENTANYL CITRATE 50 UG/ML
25 INJECTION, SOLUTION INTRAMUSCULAR; INTRAVENOUS
Status: DISCONTINUED | OUTPATIENT
Start: 2019-01-01 | End: 2019-01-01

## 2019-01-01 RX ORDER — LIDOCAINE HYDROCHLORIDE 20 MG/ML
INJECTION, SOLUTION EPIDURAL; INFILTRATION; INTRACAUDAL; PERINEURAL AS NEEDED
Status: DISCONTINUED | OUTPATIENT
Start: 2019-01-01 | End: 2019-01-01 | Stop reason: HOSPADM

## 2019-01-01 RX ORDER — SODIUM CHLORIDE 9 MG/ML
INJECTION, SOLUTION INTRAVENOUS
Status: DISCONTINUED | OUTPATIENT
Start: 2019-01-01 | End: 2019-01-01 | Stop reason: HOSPADM

## 2019-01-01 RX ORDER — DILTIAZEM HYDROCHLORIDE 180 MG/1
180 CAPSULE, COATED, EXTENDED RELEASE ORAL DAILY
Qty: 90 CAP | Refills: 3 | Status: SHIPPED | OUTPATIENT
Start: 2019-01-01

## 2019-01-01 RX ORDER — ASPIRIN 300 MG/1
300 SUPPOSITORY RECTAL
Status: COMPLETED | OUTPATIENT
Start: 2019-01-01 | End: 2019-01-01

## 2019-01-01 RX ORDER — PROPOFOL 10 MG/ML
INJECTION, EMULSION INTRAVENOUS
Status: DISCONTINUED | OUTPATIENT
Start: 2019-01-01 | End: 2019-01-01 | Stop reason: HOSPADM

## 2019-01-01 RX ORDER — GLYCOPYRROLATE 0.2 MG/ML
INJECTION INTRAMUSCULAR; INTRAVENOUS AS NEEDED
Status: DISCONTINUED | OUTPATIENT
Start: 2019-01-01 | End: 2019-01-01 | Stop reason: HOSPADM

## 2019-01-01 RX ORDER — METOPROLOL TARTRATE 5 MG/5ML
5 INJECTION INTRAVENOUS
Status: DISCONTINUED | OUTPATIENT
Start: 2019-01-01 | End: 2019-01-01

## 2019-01-01 RX ORDER — PANTOPRAZOLE SODIUM 40 MG/1
40 TABLET, DELAYED RELEASE ORAL DAILY
Status: DISCONTINUED | OUTPATIENT
Start: 2019-01-01 | End: 2019-01-01

## 2019-01-01 RX ORDER — ACETAMINOPHEN 325 MG/1
650 TABLET ORAL
Status: DISCONTINUED | OUTPATIENT
Start: 2019-01-01 | End: 2019-01-01 | Stop reason: HOSPADM

## 2019-01-01 RX ORDER — HEPARIN SODIUM 1000 [USP'U]/ML
INJECTION, SOLUTION INTRAVENOUS; SUBCUTANEOUS AS NEEDED
Status: DISCONTINUED | OUTPATIENT
Start: 2019-01-01 | End: 2019-01-01 | Stop reason: HOSPADM

## 2019-01-01 RX ORDER — HYDRALAZINE HYDROCHLORIDE 20 MG/ML
20 INJECTION INTRAMUSCULAR; INTRAVENOUS
Status: DISCONTINUED | OUTPATIENT
Start: 2019-01-01 | End: 2019-01-01 | Stop reason: HOSPADM

## 2019-01-01 RX ORDER — LEVETIRACETAM 500 MG/1
1500 TABLET ORAL 2 TIMES DAILY
Status: DISCONTINUED | OUTPATIENT
Start: 2019-01-01 | End: 2019-01-01

## 2019-01-01 RX ORDER — FENTANYL CITRATE 50 UG/ML
INJECTION, SOLUTION INTRAMUSCULAR; INTRAVENOUS AS NEEDED
Status: DISCONTINUED | OUTPATIENT
Start: 2019-01-01 | End: 2019-01-01 | Stop reason: HOSPADM

## 2019-01-01 RX ORDER — METOPROLOL TARTRATE 5 MG/5ML
5 INJECTION INTRAVENOUS
Status: DISCONTINUED | OUTPATIENT
Start: 2019-01-01 | End: 2019-01-01 | Stop reason: HOSPADM

## 2019-01-01 RX ORDER — CARVEDILOL 3.12 MG/1
3.12 TABLET ORAL 2 TIMES DAILY WITH MEALS
Status: DISCONTINUED | OUTPATIENT
Start: 2019-01-01 | End: 2019-01-01 | Stop reason: HOSPADM

## 2019-01-01 RX ORDER — GLYCOPYRROLATE 0.2 MG/ML
0.2 INJECTION INTRAMUSCULAR; INTRAVENOUS
Status: DISCONTINUED | OUTPATIENT
Start: 2019-01-01 | End: 2019-10-30 | Stop reason: HOSPADM

## 2019-01-01 RX ORDER — ASPIRIN 300 MG/1
300 SUPPOSITORY RECTAL DAILY
Status: DISCONTINUED | OUTPATIENT
Start: 2019-01-01 | End: 2019-01-01 | Stop reason: HOSPADM

## 2019-01-01 RX ORDER — LIDOCAINE HYDROCHLORIDE 10 MG/ML
10 INJECTION, SOLUTION EPIDURAL; INFILTRATION; INTRACAUDAL; PERINEURAL ONCE
Status: DISCONTINUED | OUTPATIENT
Start: 2019-01-01 | End: 2019-01-01

## 2019-01-01 RX ORDER — GLYCOPYRROLATE 0.2 MG/ML
0.2 INJECTION INTRAMUSCULAR; INTRAVENOUS EVERY 8 HOURS
Status: DISCONTINUED | OUTPATIENT
Start: 2019-01-01 | End: 2019-01-01

## 2019-01-01 RX ORDER — CARVEDILOL 3.12 MG/1
3.12 TABLET ORAL 2 TIMES DAILY WITH MEALS
Qty: 90 TAB | Refills: 0 | OUTPATIENT
Start: 2019-01-01

## 2019-01-01 RX ORDER — HEPARIN SODIUM 1000 [USP'U]/ML
INJECTION, SOLUTION INTRAVENOUS; SUBCUTANEOUS
Status: COMPLETED
Start: 2019-01-01 | End: 2019-01-01

## 2019-01-01 RX ORDER — LORAZEPAM 2 MG/ML
0.5 INJECTION INTRAMUSCULAR
Status: DISCONTINUED | OUTPATIENT
Start: 2019-01-01 | End: 2019-10-30 | Stop reason: HOSPADM

## 2019-01-01 RX ORDER — FACIAL-BODY WIPES
10 EACH TOPICAL DAILY PRN
Status: DISCONTINUED | OUTPATIENT
Start: 2019-01-01 | End: 2019-10-30 | Stop reason: HOSPADM

## 2019-01-01 RX ORDER — MORPHINE SULFATE 20 MG/ML
10 SOLUTION ORAL EVERY 4 HOURS
Status: DISCONTINUED | OUTPATIENT
Start: 2019-01-01 | End: 2019-10-30 | Stop reason: HOSPADM

## 2019-01-01 RX ORDER — LEVETIRACETAM 500 MG/1
TABLET ORAL
Qty: 540 TAB | Refills: 1 | Status: SHIPPED | OUTPATIENT
Start: 2019-01-01 | End: 2019-01-01 | Stop reason: SDUPTHER

## 2019-01-01 RX ORDER — SODIUM CHLORIDE 0.9 % (FLUSH) 0.9 %
10 SYRINGE (ML) INJECTION
Status: COMPLETED | OUTPATIENT
Start: 2019-01-01 | End: 2019-01-01

## 2019-01-01 RX ORDER — SCOLOPAMINE TRANSDERMAL SYSTEM 1 MG/1
1 PATCH, EXTENDED RELEASE TRANSDERMAL
Status: DISCONTINUED | OUTPATIENT
Start: 2019-01-01 | End: 2019-10-30 | Stop reason: HOSPADM

## 2019-01-01 RX ORDER — MORPHINE SULFATE 2 MG/ML
2 INJECTION, SOLUTION INTRAMUSCULAR; INTRAVENOUS
Status: DISCONTINUED | OUTPATIENT
Start: 2019-01-01 | End: 2019-10-30 | Stop reason: HOSPADM

## 2019-01-01 RX ORDER — MORPHINE SULFATE ORAL SOLUTION 10 MG/5ML
10 SOLUTION ORAL
Status: DISCONTINUED | OUTPATIENT
Start: 2019-01-01 | End: 2019-10-30 | Stop reason: HOSPADM

## 2019-01-01 RX ORDER — GLYCOPYRROLATE 0.2 MG/ML
0.2 INJECTION INTRAMUSCULAR; INTRAVENOUS EVERY 6 HOURS
Status: DISCONTINUED | OUTPATIENT
Start: 2019-01-01 | End: 2019-10-30 | Stop reason: HOSPADM

## 2019-01-01 RX ORDER — LEVETIRACETAM 500 MG/1
TABLET ORAL
Qty: 540 TAB | Refills: 1 | Status: SHIPPED | OUTPATIENT
Start: 2019-01-01

## 2019-01-01 RX ADMIN — Medication 10 ML: at 07:02

## 2019-01-01 RX ADMIN — LIDOCAINE HYDROCHLORIDE 40 MG: 20 INJECTION, SOLUTION EPIDURAL; INFILTRATION; INTRACAUDAL; PERINEURAL at 03:06

## 2019-01-01 RX ADMIN — SODIUM CHLORIDE 1500 MG: 900 INJECTION, SOLUTION INTRAVENOUS at 00:30

## 2019-01-01 RX ADMIN — LIDOCAINE HYDROCHLORIDE 8 ML: 20 INJECTION, SOLUTION EPIDURAL; INFILTRATION; INTRACAUDAL; PERINEURAL at 03:07

## 2019-01-01 RX ADMIN — ATROPINE SULFATE 3 DROP: 10 SOLUTION/ DROPS OPHTHALMIC at 17:58

## 2019-01-01 RX ADMIN — SODIUM CHLORIDE 1500 MG: 900 INJECTION, SOLUTION INTRAVENOUS at 23:54

## 2019-01-01 RX ADMIN — HEPARIN SODIUM 4000 UNITS: 5000 INJECTION, SOLUTION INTRAVENOUS; SUBCUTANEOUS at 03:19

## 2019-01-01 RX ADMIN — Medication 10 ML: at 05:32

## 2019-01-01 RX ADMIN — LORAZEPAM 1 MG: 2 SOLUTION, CONCENTRATE ORAL at 10:51

## 2019-01-01 RX ADMIN — SODIUM CHLORIDE 1500 MG: 900 INJECTION, SOLUTION INTRAVENOUS at 12:47

## 2019-01-01 RX ADMIN — GLYCOPYRROLATE 0.2 MG: 0.2 INJECTION INTRAMUSCULAR; INTRAVENOUS at 14:07

## 2019-01-01 RX ADMIN — LORAZEPAM 1 MG: 2 SOLUTION, CONCENTRATE ORAL at 22:43

## 2019-01-01 RX ADMIN — DILTIAZEM HYDROCHLORIDE 10 MG/HR: 5 INJECTION, SOLUTION INTRAVENOUS at 14:55

## 2019-01-01 RX ADMIN — METOPROLOL TARTRATE 5 MG: 5 INJECTION INTRAVENOUS at 23:54

## 2019-01-01 RX ADMIN — GLYCOPYRROLATE 0.2 MG: 0.2 INJECTION INTRAMUSCULAR; INTRAVENOUS at 00:16

## 2019-01-01 RX ADMIN — LORAZEPAM 0.5 MG: 2 SOLUTION, CONCENTRATE ORAL at 20:03

## 2019-01-01 RX ADMIN — SODIUM CHLORIDE, SODIUM LACTATE, POTASSIUM CHLORIDE, AND CALCIUM CHLORIDE 75 ML/HR: 600; 310; 30; 20 INJECTION, SOLUTION INTRAVENOUS at 07:17

## 2019-01-01 RX ADMIN — HYDRALAZINE HYDROCHLORIDE 20 MG: 20 INJECTION INTRAMUSCULAR; INTRAVENOUS at 16:00

## 2019-01-01 RX ADMIN — HYDRALAZINE HYDROCHLORIDE 20 MG: 20 INJECTION INTRAMUSCULAR; INTRAVENOUS at 10:03

## 2019-01-01 RX ADMIN — Medication 10 ML: at 13:37

## 2019-01-01 RX ADMIN — ATROPINE SULFATE 3 DROP: 10 SOLUTION/ DROPS OPHTHALMIC at 17:03

## 2019-01-01 RX ADMIN — METOPROLOL TARTRATE 5 MG: 5 INJECTION INTRAVENOUS at 07:19

## 2019-01-01 RX ADMIN — MORPHINE SULFATE 10 MG: 20 SOLUTION ORAL at 08:38

## 2019-01-01 RX ADMIN — DILTIAZEM HYDROCHLORIDE 2.5 MG/HR: 5 INJECTION, SOLUTION INTRAVENOUS at 20:00

## 2019-01-01 RX ADMIN — LORAZEPAM 0.5 MG: 2 INJECTION INTRAMUSCULAR; INTRAVENOUS at 19:15

## 2019-01-01 RX ADMIN — METOPROLOL TARTRATE 5 MG: 5 INJECTION INTRAVENOUS at 03:06

## 2019-01-01 RX ADMIN — LORAZEPAM 1 MG: 2 SOLUTION, CONCENTRATE ORAL at 18:55

## 2019-01-01 RX ADMIN — SODIUM CHLORIDE: 9 INJECTION, SOLUTION INTRAVENOUS at 02:54

## 2019-01-01 RX ADMIN — HEPARIN SODIUM 4000 UNITS: 5000 INJECTION, SOLUTION INTRAVENOUS; SUBCUTANEOUS at 03:17

## 2019-01-01 RX ADMIN — Medication 10 ML: at 00:40

## 2019-01-01 RX ADMIN — DILTIAZEM HYDROCHLORIDE 10 MG/HR: 5 INJECTION, SOLUTION INTRAVENOUS at 03:30

## 2019-01-01 RX ADMIN — ASPIRIN 300 MG: 300 SUPPOSITORY RECTAL at 09:05

## 2019-01-01 RX ADMIN — ATROPINE SULFATE 3 DROP: 10 SOLUTION/ DROPS OPHTHALMIC at 12:29

## 2019-01-01 RX ADMIN — SODIUM CHLORIDE 1500 MG: 900 INJECTION, SOLUTION INTRAVENOUS at 12:12

## 2019-01-01 RX ADMIN — ASPIRIN 300 MG: 300 SUPPOSITORY RECTAL at 11:00

## 2019-01-01 RX ADMIN — PROPOFOL 20 MG: 10 INJECTION, EMULSION INTRAVENOUS at 03:22

## 2019-01-01 RX ADMIN — LORAZEPAM 1 MG: 2 SOLUTION, CONCENTRATE ORAL at 03:12

## 2019-01-01 RX ADMIN — METOPROLOL TARTRATE 2.5 MG: 5 INJECTION INTRAVENOUS at 20:21

## 2019-01-01 RX ADMIN — LEVETIRACETAM 1500 MG: 100 INJECTION, SOLUTION INTRAVENOUS at 17:58

## 2019-01-01 RX ADMIN — ASPIRIN 300 MG: 300 SUPPOSITORY RECTAL at 02:55

## 2019-01-01 RX ADMIN — DILTIAZEM HYDROCHLORIDE 2.5 MG/HR: 5 INJECTION, SOLUTION INTRAVENOUS at 22:05

## 2019-01-01 RX ADMIN — IOPAMIDOL 120 ML: 612 INJECTION, SOLUTION INTRAVENOUS at 03:45

## 2019-01-01 RX ADMIN — SODIUM CHLORIDE, SODIUM LACTATE, POTASSIUM CHLORIDE, AND CALCIUM CHLORIDE 50 ML/HR: 600; 310; 30; 20 INJECTION, SOLUTION INTRAVENOUS at 01:36

## 2019-01-01 RX ADMIN — MORPHINE SULFATE 10 MG: 20 SOLUTION ORAL at 17:04

## 2019-01-01 RX ADMIN — HEPARIN SODIUM 3000 UNITS: 1000 INJECTION, SOLUTION INTRAVENOUS; SUBCUTANEOUS at 03:11

## 2019-01-01 RX ADMIN — LEVETIRACETAM 1500 MG: 100 INJECTION, SOLUTION INTRAVENOUS at 12:25

## 2019-01-01 RX ADMIN — METOPROLOL TARTRATE 2.5 MG: 5 INJECTION INTRAVENOUS at 21:30

## 2019-01-01 RX ADMIN — SODIUM CHLORIDE, SODIUM LACTATE, POTASSIUM CHLORIDE, AND CALCIUM CHLORIDE 75 ML/HR: 600; 310; 30; 20 INJECTION, SOLUTION INTRAVENOUS at 17:58

## 2019-01-01 RX ADMIN — HEPARIN SODIUM 4000 UNITS: 5000 INJECTION, SOLUTION INTRAVENOUS; SUBCUTANEOUS at 03:30

## 2019-01-01 RX ADMIN — SODIUM CHLORIDE 40 MG: 9 INJECTION INTRAMUSCULAR; INTRAVENOUS; SUBCUTANEOUS at 08:59

## 2019-01-01 RX ADMIN — ONDANSETRON 4 MG: 2 INJECTION INTRAMUSCULAR; INTRAVENOUS at 04:20

## 2019-01-01 RX ADMIN — HYDRALAZINE HYDROCHLORIDE 20 MG: 20 INJECTION INTRAMUSCULAR; INTRAVENOUS at 08:19

## 2019-01-01 RX ADMIN — MORPHINE SULFATE 10 MG: 20 SOLUTION ORAL at 12:29

## 2019-01-01 RX ADMIN — SODIUM CHLORIDE 2.5 MG/HR: 900 INJECTION, SOLUTION INTRAVENOUS at 05:00

## 2019-01-01 RX ADMIN — HYDRALAZINE HYDROCHLORIDE 20 MG: 20 INJECTION INTRAMUSCULAR; INTRAVENOUS at 13:49

## 2019-01-01 RX ADMIN — ATROPINE SULFATE 3 DROP: 10 SOLUTION/ DROPS OPHTHALMIC at 08:43

## 2019-01-01 RX ADMIN — GLYCOPYRROLATE 0.2 MG: 0.2 INJECTION INTRAMUSCULAR; INTRAVENOUS at 03:58

## 2019-01-01 RX ADMIN — GLYCOPYRROLATE 0.2 MG: 0.2 INJECTION INTRAMUSCULAR; INTRAVENOUS at 23:18

## 2019-01-01 RX ADMIN — SODIUM CHLORIDE, SODIUM LACTATE, POTASSIUM CHLORIDE, AND CALCIUM CHLORIDE 50 ML/HR: 600; 310; 30; 20 INJECTION, SOLUTION INTRAVENOUS at 05:14

## 2019-01-01 RX ADMIN — GLYCOPYRROLATE 0.2 MG: 0.2 INJECTION INTRAMUSCULAR; INTRAVENOUS at 19:31

## 2019-01-01 RX ADMIN — IOPAMIDOL 110 ML: 755 INJECTION, SOLUTION INTRAVENOUS at 00:24

## 2019-01-01 RX ADMIN — GLYCOPYRROLATE 0.2 MG: 0.2 INJECTION INTRAMUSCULAR; INTRAVENOUS at 06:59

## 2019-01-01 RX ADMIN — PROPOFOL 20 MG: 10 INJECTION, EMULSION INTRAVENOUS at 03:06

## 2019-01-01 RX ADMIN — Medication 10 ML: at 22:10

## 2019-01-01 RX ADMIN — HEPARIN SODIUM 4000 UNITS: 5000 INJECTION, SOLUTION INTRAVENOUS; SUBCUTANEOUS at 03:15

## 2019-01-01 RX ADMIN — PROPOFOL 10 MG: 10 INJECTION, EMULSION INTRAVENOUS at 03:07

## 2019-01-01 RX ADMIN — MORPHINE SULFATE 10 MG: 10 SOLUTION ORAL at 06:58

## 2019-01-01 RX ADMIN — PROPOFOL 50 MCG/KG/MIN: 10 INJECTION, EMULSION INTRAVENOUS at 03:04

## 2019-01-01 RX ADMIN — SODIUM CHLORIDE 1500 MG: 900 INJECTION, SOLUTION INTRAVENOUS at 12:19

## 2019-01-01 RX ADMIN — ATROPINE SULFATE 3 DROP: 10 SOLUTION/ DROPS OPHTHALMIC at 22:45

## 2019-01-01 RX ADMIN — MORPHINE SULFATE 2 MG: 2 INJECTION, SOLUTION INTRAMUSCULAR; INTRAVENOUS at 19:15

## 2019-01-01 RX ADMIN — LORAZEPAM 1 MG: 2 SOLUTION, CONCENTRATE ORAL at 07:00

## 2019-01-01 RX ADMIN — METOPROLOL TARTRATE 5 MG: 5 INJECTION INTRAVENOUS at 06:06

## 2019-01-01 RX ADMIN — METOPROLOL TARTRATE 5 MG: 5 INJECTION INTRAVENOUS at 01:26

## 2019-01-01 RX ADMIN — MORPHINE SULFATE 10 MG: 20 SOLUTION ORAL at 17:06

## 2019-01-01 RX ADMIN — MORPHINE SULFATE 10 MG: 20 SOLUTION ORAL at 02:10

## 2019-01-01 RX ADMIN — DILTIAZEM HYDROCHLORIDE 5 MG/HR: 5 INJECTION, SOLUTION INTRAVENOUS at 23:30

## 2019-01-01 RX ADMIN — LEVETIRACETAM 1500 MG: 100 INJECTION, SOLUTION INTRAVENOUS at 23:18

## 2019-01-01 RX ADMIN — LEVETIRACETAM 1500 MG: 100 INJECTION, SOLUTION INTRAVENOUS at 00:16

## 2019-01-01 RX ADMIN — ATROPINE SULFATE 3 DROP: 10 SOLUTION/ DROPS OPHTHALMIC at 22:48

## 2019-01-01 RX ADMIN — HEPARIN SODIUM 4000 UNITS: 5000 INJECTION, SOLUTION INTRAVENOUS; SUBCUTANEOUS at 03:32

## 2019-01-01 RX ADMIN — MORPHINE SULFATE 10 MG: 20 SOLUTION ORAL at 22:48

## 2019-01-01 RX ADMIN — SODIUM CHLORIDE 40 MG: 9 INJECTION INTRAMUSCULAR; INTRAVENOUS; SUBCUTANEOUS at 09:05

## 2019-01-01 RX ADMIN — MORPHINE SULFATE 10 MG: 20 SOLUTION ORAL at 22:51

## 2019-01-01 RX ADMIN — LORAZEPAM 1 MG: 2 SOLUTION, CONCENTRATE ORAL at 18:21

## 2019-01-01 RX ADMIN — GLYCOPYRROLATE 0.2 MG: 0.2 INJECTION INTRAMUSCULAR; INTRAVENOUS at 06:45

## 2019-01-01 RX ADMIN — FENTANYL CITRATE 25 MCG: 50 INJECTION, SOLUTION INTRAMUSCULAR; INTRAVENOUS at 03:27

## 2019-01-01 RX ADMIN — LORAZEPAM 1 MG: 2 SOLUTION, CONCENTRATE ORAL at 22:49

## 2019-03-15 NOTE — TELEPHONE ENCOUNTER
[facility] Pharmacist  / Devendra Zhu calling in ref to getting a refill on pt's [address]  [phone]

## 2019-04-01 NOTE — PROGRESS NOTES
[address], [facility], [address] [facility] Anneliese Watson is a 80 y.o. male. Last seen by me 5 months prior. Subjective:  
 
Mr. Sheffield Divers states he is doing well with no interval cardiac complaints. Patient denies any exertional chest pain, dyspnea, palpitations, syncope, orthopnea, edema or paroxysmal nocturnal dyspnea. He is here today with his wife. Patient Active Problem List  
 Diagnosis Date Noted  Acute encephalopathy 09/29/2018  Atrial fibrillation with RVR ([facility] 09/29/2018  Alzheimer's type dementia with late onset without behavioral disturbance 06/12/2017  Complex partial seizures with consciousness impaired ([facility] [address] 12/21/2015  Elevated troponin 07/29/2014  CAD (coronary artery disease) 07/28/2014  
 HTN (hypertension) 07/28/2014  Hyperlipidemia 07/28/2014 Chuck Baron NP Past Medical History:  
Diagnosis Date  CAD (coronary artery disease)  Hypercholesteremia  Hypertension  Seizures ([facility] [address]   
 last seizure in 2011 per pt Past Surgical History:  
Procedure Laterality Date  CARDIAC SURG PROCEDURE UNLIST    
 cardiac stents [address]  HX CHOLECYSTECTOMY  2007  [place] EGD BALLOON DILATION ESOPHAGUS <30 MM DIAM  11/21/2012 No Known Allergies Family History Problem Relation Age of Onset  Alcohol abuse Mother Social History Socioeconomic History  Marital status:  Spouse name: Not on file  Number of children: Not on file  Years of education: Not on file  Highest education level: Not on file Occupational History  Not on file Social Needs  Financial resource strain: Not on file  Food insecurity:  
  Worry: Not on file Inability: Not on file  Transportation needs:  
  Medical: Not on file Non-medical: Not on file Tobacco Use  Smoking status: Former Smoker Types: Cigarettes  Smokeless tobacco: Never Used  Tobacco comment: years ago- not sure when Substance and Sexual Activity  Alcohol use: No  
 Drug use: No  
 Sexual activity: Not on file Lifestyle  Physical activity:  
  Days per week: Not on file Minutes per session: Not on file  Stress: Not on file Relationships  Social connections:  
  Talks on phone: Not on file Gets together: Not on file Attends Spiritism service: Not on file Active member of club or organization: Not on file Attends meetings of clubs or organizations: Not on file Relationship status: Not on file  Intimate partner violence:  
  Fear of current or ex partner: Not on file Emotionally abused: Not on file Physically abused: Not on file Forced sexual activity: Not on file Other Topics Concern  Not on file Social History Narrative  Not on file Current Outpatient Medications Medication Sig  levETIRAcetam (KEPPRA) 500 mg tablet take 3 tablets by mouth twice a day  carvedilol (COREG) 3.125 mg tablet Take 1 Tab by mouth two (2) times daily (with meals).  galantamine (RAZADYNE) 8 mg SR capsule take 1 capsule by mouth daily before BREAKFAST  dilTIAZem CD (CARDIZEM CD) 180 mg ER capsule Take 1 Cap by mouth daily.  cyanocobalamin (VITAMIN B-12) 2,500 mcg sublingual tablet Take 5,000 mcg by mouth daily.  ASCORBATE CALCIUM (VITAMIN C PO) Take  by mouth daily.  Calcium-Cholecalciferol, D3, 600 mg(1,500mg) -400 unit chew Take  by mouth daily.  aspirin 81 mg chewable tablet Take 81 mg by mouth nightly.  rosuvastatin (CRESTOR) 5 mg tablet Take 5 mg by mouth daily. No current facility-administered medications for this visit. Review of Systems Constitutional: Negative for fever, chills, malaise/fatigue and diaphoresis. Respiratory: Negative for cough, hemoptysis, sputum production, shortness of breath and wheezing.   
Cardiovascular: Negative for chest pain, palpitations, orthopnea, claudication, leg swelling and PND. Gastrointestinal: Negative for heartburn, nausea, vomiting, blood in stool and melena. Genitourinary: Negative for dysuria and flank pain. Musculoskeletal: Negative for joint pain and back pain. Skin: Negative for rash. Neurological: Negative for focal weakness, seizures, loss of consciousness, weakness and headaches. Endo/Heme/Allergies: Does not bruise/bleed easily. Psychiatric/Behavioral: Negative for memory loss. The patient does not have insomnia. Physical Exam:   
Visit Vitals /62 (BP 1 Location: Left arm, BP Patient Position: Sitting) Pulse (!) 56 Resp 16 Ht 5' 8\" (1.727 m) Wt 123 lb 6.4 oz (56 kg) SpO2 97% BMI 18.76 kg/m² Wt Readings from Last 3 Encounters:  
04/02/19 123 lb 6.4 oz (56 kg) 12/31/18 121 lb (54.9 kg) 10/16/18 123 lb 6.4 oz (56 kg) Gen: NAD Mental Status - Alert. General Appearance - Not in acute distress. Neck - no JVD Chest and Lung Exam  
Inspection: Accessory muscles - No use of accessory muscles in breathing. Auscultation:  
Breath sounds: - Normal.  
 
Cardiovascular Inspection: Jugular vein - Bilateral - Inspection Normal.  
Palpation/Percussion:  
Apical Impulse: - Normal.  
Auscultation: Rhythm - Regular. Heart Sounds - S1 WNL and S2 WNL. No S3 or S4. Murmurs & Other Heart Sounds: Auscultation of the heart reveals - No Murmurs. Peripheral Vascular Upper Extremity: Inspection - Bilateral - No Cyanotic nailbeds or Digital clubbing. Lower Extremity:  
Palpation: Edema - Bilateral - No edema. Abdomen: Soft, non-tender, bowel sounds are active. Neuro: A&O times 3, CN and motor grossly WNL Cardiographics EKG 4/2/19 - SR, first degree AVB, unchanged from previous Assessment:  
 
Encounter Diagnoses Name Primary?  Mixed hyperlipidemia Yes  Essential hypertension Plan:  
 
Mr. Canales Son has a history of AF. His EKG today is unchanged from previous. His BP is 142/62. No further testing is necessary at this time. Continue current medical regimen. Follow up in 6 months or PRN Written by Alexandro Montenegro, as dictated by Dr. Emmanuelle Awan.

## 2019-04-02 NOTE — PROGRESS NOTES
1. Have you been to the ER, urgent care clinic since your last visit? Hospitalized since your last visit? No. 
 
2. Have you seen or consulted any other health care providers outside of the [address] [place] [address] since your last visit? Include any pap smears or colon screening. SEEN BY PCP. Chief Complaint Patient presents with  Follow-up 6 month- pt denies any cardiac symptoms 3

## 2019-06-10 NOTE — TELEPHONE ENCOUNTER
----- Message from Samra Kat sent at 6/10/2019 11:10 AM EDT -----  Regarding: JUAN LUIS Gross/rx refill  Pt's daughter Chemo Ortega (p) [phone] , said her father is due for his rx refill for his  Generic brand for Keppra  His  levetiracetam  500 mg he takes  three tabs 2x  A day, when he was going to take his medication he notice ants on his  medication, and without thinking he tried to rinse the medication  Off and the medication had  dissolved  she was able to save  enough for today and tomorrow, he will need a rx refilled by  Tomorrow  For his [facility] [phone] she does not want him to miss a dose

## 2019-08-29 NOTE — PROGRESS NOTES
Date:  19     Name:  Rose Mary Molina  :  1927  MRN:  [id number]     PCP:  Chely Celaya NP    Chief Complaint   Patient presents with    Epilepsy     HISTORY OF PRESENT ILLNESS: Follow-up for epilepsy and Alzheimer's type dementia. He does continue taking Keppra without any difficulties or signs or symptoms of toxicity. No seizures since last Kofi. Memory appears to be stable on Razadyne 8 mg daily. Except as noted above, denies  fever, chills, cough. No CP or SOB. No dysuria, loss of bowel or bladder control. No Weight loss. Appetite good. Sleeping well. No sweats. No edema. No bruising or bleeding. No nausea or vomit. No diarrhea. No frequency, urgency, No depressive sxs. No anxiety. Denies sore throat, nasal congestion, nasal discharge, epistaxis, tinnitus, hearing loss, back pain, muscle pain, or joint pain. Current Outpatient Medications   Medication Sig    levETIRAcetam (KEPPRA) 500 mg tablet take 3 tablets by mouth twice a day    dilTIAZem CD (CARDIZEM CD) 180 mg ER capsule Take 1 Cap by mouth daily.  carvedilol (COREG) 3.125 mg tablet Take 1 Tab by mouth two (2) times daily (with meals).  galantamine (RAZADYNE) 8 mg SR capsule take 1 capsule by mouth daily before BREAKFAST    cyanocobalamin (VITAMIN B-12) 2,500 mcg sublingual tablet Take 5,000 mcg by mouth daily.  ASCORBATE CALCIUM (VITAMIN C PO) Take  by mouth daily.  Calcium-Cholecalciferol, D3, 600 mg(1,500mg) -400 unit chew Take  by mouth daily.  aspirin 81 mg chewable tablet Take 81 mg by mouth nightly.  rosuvastatin (CRESTOR) 5 mg tablet Take 5 mg by mouth daily. No current facility-administered medications for this visit.       No Known Allergies  Past Medical History:   Diagnosis Date    CAD (coronary artery disease)     Hypercholesteremia     Hypertension     Seizures ([address]     last seizure in  per pt     Past Surgical History:   Procedure Laterality Date    CARDIAC SURG PROCEDURE UNLIST      cardiac stents    HX APPENDECTOMY  1945    HX CHOLECYSTECTOMY  2007    ID EGD BALLOON DILATION ESOPHAGUS <30 MM DIAM  11/21/2012          Social History     Socioeconomic History    Marital status:      Spouse name: Not on file    Number of children: Not on file    Years of education: Not on file    Highest education level: Not on file   Occupational History    Not on file   Social Needs    Financial resource strain: Not on file    Food insecurity:     Worry: Not on file     Inability: Not on file    Transportation needs:     Medical: Not on file     Non-medical: Not on file   Tobacco Use    Smoking status: Former Smoker     Types: Cigarettes    Smokeless tobacco: Never Used    Tobacco comment: years ago- not sure when   Substance and Sexual Activity    Alcohol use: No    Drug use: No    Sexual activity: Not on file   Lifestyle    Physical activity:     Days per week: Not on file     Minutes per session: Not on file    Stress: Not on file   Relationships    Social connections:     Talks on phone: Not on file     Gets together: Not on file     Attends Advent service: Not on file     Active member of club or organization: Not on file     Attends meetings of clubs or organizations: Not on file     Relationship status: Not on file    Intimate partner violence:     Fear of current or ex partner: Not on file     Emotionally abused: Not on file     Physically abused: Not on file     Forced sexual activity: Not on file   Other Topics Concern    Not on file   Social History Narrative    Not on file     Family History   Problem Relation Age of Onset    Alcohol abuse Mother        PHYSICAL EXAMINATION:    Visit Vitals  /62   Resp 20   Ht 5' 8\" (1.727 m)   BMI 18.76 kg/m²     General: Well defined, nourished, and groomed individual in no acute distress. Neck: Supple, nontender, no bruits, no pain with resistance to active range of motion. Heart: Regular rate and rhythm, no murmurs, rub, or gallop. Normal S1S2. Lungs: Clear to auscultation bilaterally with equal chest expansion, no cough, no wheeze   Musculoskeletal: Extremities revealed no edema and had full range of motion of joints. Psych: Good mood and bright affect   NEUROLOGICAL EXAMINATION:   Mental Status: Alert and oriented to person, place, and time   Cranial Nerves:   II, III, IV, VI: Visual acuity grossly intact. Visual fields are normal.   Pupils are equal, round, and reactive to light and accommodation. Extra-ocular movements are full and fluid. Fundoscopic exam was benign, no ptosis or nystagmus. V-XII: Hearing is grossly intact. Facial features are symmetric, with normal sensation and strength. The palate rises symmetrically and the tongue protrudes midline. Sternocleidomastoids 5/5. Motor Examination: Normal tone, bulk, and strength, 5/5 muscle strength throughout. Coordination: No resting or intention tremor   Gait and Station: Steady while walking. No muscle wasting or fasiculations noted. Reflexes: DTRs 2+ throughout. ASSESSMENT AND PLAN    ICD-10-CM ICD-9-CM    1. Complex partial seizures with consciousness impaired (HCC) G40.209 345.40 levETIRAcetam (KEPPRA) 500 mg tablet   2. 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 Continue with Razadyen ER. Epilepsy is stable on present therapy of Keppra without signs or symptoms of side effect or toxicity. He will continue with this as previously prescribed. Follow up in one year    [facility].  Paula Sample

## 2019-09-27 NOTE — TELEPHONE ENCOUNTER
----- Message from Freedom Williams Ayana8 sent at 9/27/2019  8:33 AM EDT -----  Regarding: Sona Gross/ telephone    General Message/Vendor Calls    Caller's first and last name: Renee Chavez, daughter      Reason for call: stated the pt saw his pcp ad his depakote level was very low (less than 4)      Callback required yes/no and why: yes      Best contact number(s): [phone]      Details to clarify the request:      Freedom Kramer Ayana1

## 2019-10-08 NOTE — PROGRESS NOTES
1. Have you been to the ER, urgent care clinic since your last visit? Hospitalized since your last visit? NO.    2. Have you seen or consulted any other health care providers outside of the [address] [place] [address] since your last visit? Include any pap smears or colon screening. NO.         Chief Complaint   Patient presents with    Follow-up     [address]

## 2019-10-08 NOTE — LETTER
10/8/19 Patient: Geraldine Silverman Sr  
YOB: 1927 Date of Visit: 10/8/2019 Rubens Olson NP 
[address] [address] [facility] [address] [address] VIA Facsimile: [phone] Dear Rubens Olson NP, Thank you for referring Mr. Deb Bennett to [address] [place] [address] for evaluation. My notes for this consultation are attached. If you have questions, please do not hesitate to call me. I look forward to following your patient along with you.  
 
 
Sincerely, 
 
Norm Harvey MD

## 2019-10-08 NOTE — PROGRESS NOTES
[address] [place] [address] [address] [place], [address] [facility]     Colt Flood Sr is a 80 y.o. male. Last seen by me 6 months prior. Subjective:     Mr. Renato Chan states he is feeling well overall with no interval issues. He does not check his BP at often. His wife states it has been good most of the time. Patient denies any exertional chest pain, dyspnea, palpitations, syncope, orthopnea, edema or paroxysmal nocturnal dyspnea. He is here today with his daughter.      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 ([facility] 06/12/2017    Complex partial seizures with consciousness impaired ([facility] 12/21/2015    Elevated troponin 07/29/2014    CAD (coronary artery disease) 07/28/2014    HTN (hypertension) 07/28/2014    Hyperlipidemia 07/28/2014      Raissa Brooke NP  Past Medical History:   Diagnosis Date    CAD (coronary artery disease)     Hypercholesteremia     Hypertension     Seizures ([facility]     last seizure in 2011 per pt      Past Surgical History:   Procedure Laterality Date    CARDIAC SURG PROCEDURE UNLIST      cardiac stents    HX APPENDECTOMY  1945    HX CHOLECYSTECTOMY  2007    ME EGD BALLOON DILATION ESOPHAGUS <30 MM DIAM  11/21/2012          No Known Allergies   Family History   Problem Relation Age of Onset    Alcohol abuse Mother       Social History     Socioeconomic History    Marital status:      Spouse name: Not on file    Number of children: Not on file    Years of education: Not on file    Highest education level: Not on file   Occupational History    Not on file   Social Needs    Financial resource strain: Not on file    Food insecurity:     Worry: Not on file     Inability: Not on file    Transportation needs:     Medical: Not on file     Non-medical: Not on file   Tobacco Use    Smoking status: Former Smoker     Types: Cigarettes    Smokeless tobacco: Never Used    Tobacco comment: years ago- not sure when   Substance and Sexual Activity    Alcohol use: No    Drug use: No    Sexual activity: Not on file   Lifestyle    Physical activity:     Days per week: Not on file     Minutes per session: Not on file    Stress: Not on file   Relationships    Social connections:     Talks on phone: Not on file     Gets together: Not on file     Attends Sikhism service: Not on file     Active member of club or organization: Not on file     Attends meetings of clubs or organizations: Not on file     Relationship status: Not on file    Intimate partner violence:     Fear of current or ex partner: Not on file     Emotionally abused: Not on file     Physically abused: Not on file     Forced sexual activity: Not on file   Other Topics Concern    Not on file   Social History Narrative    Not on file      Current Outpatient Medications   Medication Sig    levETIRAcetam (KEPPRA) 500 mg tablet take 3 tablets by mouth twice a day    galantamine (RAZADYNE) 8 mg SR capsule take 1 capsule by mouth daily before BREAKFAST    dilTIAZem CD (CARDIZEM CD) 180 mg ER capsule Take 1 Cap by mouth daily.  carvedilol (COREG) 3.125 mg tablet Take 1 Tab by mouth two (2) times daily (with meals).  cyanocobalamin (VITAMIN B-12) 2,500 mcg sublingual tablet Take 5,000 mcg by mouth daily.  ASCORBATE CALCIUM (VITAMIN C PO) Take  by mouth daily.  Calcium-Cholecalciferol, D3, 600 mg(1,500mg) -400 unit chew Take  by mouth daily.  aspirin 81 mg chewable tablet Take 81 mg by mouth nightly.  rosuvastatin (CRESTOR) 5 mg tablet Take 5 mg by mouth daily. No current facility-administered medications for this visit. Review of Systems  Constitutional: Negative for fever, chills, malaise/fatigue and diaphoresis. Respiratory: Negative for cough, hemoptysis, sputum production, shortness of breath and wheezing. Cardiovascular: Negative for chest pain, palpitations, orthopnea, claudication, leg swelling and PND. Gastrointestinal: Negative for heartburn, nausea, vomiting, blood in stool and melena. Genitourinary: Negative for dysuria and flank pain. Musculoskeletal: Negative for joint pain and back pain. Skin: Negative for rash. Neurological: Negative for focal weakness, seizures, loss of consciousness, weakness and headaches. Endo/Heme/Allergies: Does not bruise/bleed easily. Psychiatric/Behavioral: Negative for memory loss. The patient does not have insomnia. Physical Exam:    Visit Vitals  /78 (BP 1 Location: Left arm, BP Patient Position: Sitting)   Pulse (!) 55   Resp 16   Ht 5' 8\" (1.727 m)   Wt 124 lb (56.2 kg)   SpO2 97%   BMI 18.85 kg/m²     Wt Readings from Last 3 Encounters:   10/08/19 124 lb (56.2 kg)   04/02/19 123 lb 6.4 oz (56 kg)   12/31/18 121 lb (54.9 kg)       Gen: NAD    Mental Status - Alert. General Appearance - Not in acute distress. Neck - no JVD    Chest and Lung Exam   Inspection: Accessory muscles - No use of accessory muscles in breathing. Auscultation:   Breath sounds: - Normal.     Cardiovascular   Inspection: Jugular vein - Bilateral - Inspection Normal.   Palpation/Percussion:   Apical Impulse: - Normal.   Auscultation: Rhythm - Regular. Heart Sounds - S1 WNL and S2 WNL. No S3 or S4. Murmurs & Other Heart Sounds: Auscultation of the heart reveals - No Murmurs. Peripheral Vascular   Upper Extremity: Inspection - Bilateral - No Cyanotic nailbeds or Digital clubbing. Lower Extremity:   Palpation: Edema - Bilateral - No edema. Abdomen: Soft, non-tender, bowel sounds are active. Neuro: A&O times 3, CN and motor grossly WNL    Cardiographics  EKG 10/8/19 - SB, 1st degree AV block, poor R wave progression, unchanged compared with 6 months ago     Assessment:     Encounter Diagnoses   Name Primary?     Essential hypertension Yes    Atrial fibrillation with RVR [facility])     Coronary artery disease involving native heart with angina pectoris, unspecified vessel or lesion type ([facility]     1st degree AV block       Plan:     Mr. Valdo Deleon has a history of AF. His EKG today is unchanged from previous. His BP is 160/78. Quintin Rein No further testing is necessary at this time. Continue current medical regimen.      Follow up in 6 months or PRN    Written by Farzana Mancia, as dictated by Kaycee Hernandez M.D.

## 2019-10-10 NOTE — TELEPHONE ENCOUNTER
Returned her call. Not having any issues, not having any seizures. She was just letting us know about his blood work. She did state there was one day that he seemed kind of our of it, fatigued but other than that he has been pretty normal.     It stands to reason that his Depakote level would be low since he is not on Depakote but Keppra per NP. Contacted the patients daughter and gave her the previous message sent from the NP. SHe has verbalized understanding.

## 2019-10-24 NOTE — ED TRIAGE NOTES
The patient presents to the ED with complaints of doing a word search at 12:30 this afternoon and then went \"unresponsive\" wife reports history of seizures, but states he has \"not had one like this\" and \"they dont last this long\". Patient is alert, unable to answer questions at this time. Patient is having trouble moving right arm. MD Madera at the bedside now

## 2019-10-24 NOTE — ED PROVIDER NOTES
EMERGENCY DEPARTMENT HISTORY AND PHYSICAL EXAM 
 
 
Date: 10/24/2019 Patient Name: Blu Farris Please note that this dictation was completed with [employer], the computer voice recognition software. Quite often unanticipated grammatical, syntax, homophones, and other interpretive errors are inadvertently transcribed by the computer software. Please disregard these errors. Please excuse any errors that have escaped final proofreading. History of Presenting Illness Chief Complaint Patient presents with  Unresponsive History Provided By:  Patient's wife HPI: Blu Farris, 80 y.o. male, with a past medical history significant for seizures presenting the emergency department with unresponsive episode. Around 1230 the patient had an episode where he began staring off into space and was minimally responsive. He is not improved. Per his wife he normally has some shaking of his mouth with his seizures, but she did not note any of that today. No tonic-clonic activity. No loss of bowel or bladder. He is been minimally responsive, will open eyes, tracks, follows some commands. He is not moving his upper or lower extremities. Per nursing he was moving his left arm earlier, but now he will not follow my commands. Unable to provide any significant history at this time. PCP: Nydia Vu NP No current facility-administered medications on file prior to encounter. Current Outpatient Medications on File Prior to Encounter Medication Sig Dispense Refill  levETIRAcetam (KEPPRA) 500 mg tablet take 3 tablets by mouth twice a day 540 Tab 1  
 galantamine (RAZADYNE) 8 mg SR capsule take 1 capsule by mouth daily before BREAKFAST 90 Cap 1  dilTIAZem CD (CARDIZEM CD) 180 mg ER capsule Take 1 Cap by mouth daily. 90 Cap 3  carvedilol (COREG) 3.125 mg tablet Take 1 Tab by mouth two (2) times daily (with meals).  90 Tab 0  
  cyanocobalamin (VITAMIN B-12) 2,500 mcg sublingual tablet Take 5,000 mcg by mouth daily.  ASCORBATE CALCIUM (VITAMIN C PO) Take  by mouth daily.  Calcium-Cholecalciferol, D3, 600 mg(1,500mg) -400 unit chew Take  by mouth daily.  aspirin 81 mg chewable tablet Take 81 mg by mouth nightly.  rosuvastatin (CRESTOR) 5 mg tablet Take 5 mg by mouth daily. Past History Past Medical History: 
Past Medical History:  
Diagnosis Date  CAD (coronary artery disease)  Hypercholesteremia  Hypertension  Seizures ([address]   
 last seizure in 2011 per pt Past Surgical History: 
Past Surgical History:  
Procedure Laterality Date  CARDIAC SURG PROCEDURE UNLIST    
 cardiac stents [address]  HX CHOLECYSTECTOMY  2007  MO EGD BALLOON DILATION ESOPHAGUS <30 MM DIAM  11/21/2012 Family History: 
Family History Problem Relation Age of Onset  Alcohol abuse Mother Social History: 
Social History Tobacco Use  Smoking status: Former Smoker Types: Cigarettes  Smokeless tobacco: Never Used  Tobacco comment: years ago- not sure when Substance Use Topics  Alcohol use: No  
 Drug use: No  
 
 
Allergies: 
No Known Allergies Review of Systems Review of Systems Unable to perform ROS: Patient nonverbal  
 
 
Physical Exam  
Physical Exam  
Constitutional:  
Awake, alert male, staring off into space. Tracking, extraocular muscle movements are intact. HENT:  
Head: Normocephalic and atraumatic. Moist mucous membranes Eyes: Pupils are equal, round, and reactive to light. Conjunctivae are normal. Right eye exhibits no discharge. Left eye exhibits no discharge. Neck: Normal range of motion. Neck supple. No tracheal deviation present. Cardiovascular: Normal rate and regular rhythm. Murmur (systolic murmur) heard.  
Pulmonary/Chest: Effort normal and breath sounds normal. No respiratory distress. He has no wheezes. He has no rales. Abdominal: Soft. Bowel sounds are normal. There is no tenderness. There is no rebound and no guarding. Musculoskeletal: Normal range of motion. He exhibits no edema, tenderness or deformity. Neurological: He is alert. Nonverbal, moves his left thumb when asked to give a thumbs up, otherwise will not hold his arms up, will not lift his legs up. Skin: Skin is warm and dry. No rash noted. No erythema. Psychiatric:  
Unable to assess Nursing note and vitals reviewed. Diagnostic Study Results Labs - Recent Results (from the past 12 hour(s)) GLUCOSE, POC Collection Time: 10/24/19  2:00 PM  
Result Value Ref Range Glucose (POC) 102 (H) 65 - 100 mg/dL Performed by José Antonio Angeles \"Mayo\" CBC WITH AUTOMATED DIFF Collection Time: 10/24/19  2:15 PM  
Result Value Ref Range WBC 7.3 4.1 - 11.1 K/uL  
 RBC 3.59 (L) 4.10 - 5.70 M/uL  
 HGB 11.9 (L) 12.1 - 17.0 g/dL HCT 36.7 36.6 - 50.3 % .2 (H) 80.0 - 99.0 FL  
 MCH 33.1 26.0 - 34.0 PG  
 MCHC 32.4 30.0 - 36.5 g/dL  
 RDW 12.3 11.5 - 14.5 % PLATELET 314 418 - 197 K/uL MPV 10.9 8.9 - 12.9 FL  
 NRBC 0.0 0  WBC ABSOLUTE NRBC 0.00 0.00 - 0.01 K/uL NEUTROPHILS 58 32 - 75 % LYMPHOCYTES 23 12 - 49 % MONOCYTES 14 (H) 5 - 13 % EOSINOPHILS 4 0 - 7 % BASOPHILS 1 0 - 1 % IMMATURE GRANULOCYTES 0 0.0 - 0.5 % ABS. NEUTROPHILS 4.3 1.8 - 8.0 K/UL  
 ABS. LYMPHOCYTES 1.7 0.8 - 3.5 K/UL  
 ABS. MONOCYTES 1.0 0.0 - 1.0 K/UL  
 ABS. EOSINOPHILS 0.3 0.0 - 0.4 K/UL  
 ABS. BASOPHILS 0.0 0.0 - 0.1 K/UL  
 ABS. IMM. GRANS. 0.0 0.00 - 0.04 K/UL  
 DF AUTOMATED METABOLIC PANEL, COMPREHENSIVE Collection Time: 10/24/19  2:15 PM  
Result Value Ref Range Sodium 136 136 - 145 mmol/L Potassium 4.1 3.5 - 5.1 mmol/L Chloride 104 97 - 108 mmol/L  
 CO2 27 21 - 32 mmol/L Anion gap 5 5 - 15 mmol/L Glucose 93 65 - 100 mg/dL  BUN 27 (H) 6 - 20 MG/DL  
 Creatinine 1.05 0.70 - 1.30 MG/DL  
 BUN/Creatinine ratio 26 (H) 12 - 20 GFR est AA >60 >60 ml/min/1.73m2 GFR est non-AA >60 >60 ml/min/1.73m2 Calcium 8.5 8.5 - 10.1 MG/DL Bilirubin, total 0.3 0.2 - 1.0 MG/DL  
 ALT (SGPT) 20 12 - 78 U/L  
 AST (SGOT) 16 15 - 37 U/L Alk. phosphatase 103 45 - 117 U/L Protein, total 7.3 6.4 - 8.2 g/dL Albumin 3.5 3.5 - 5.0 g/dL Globulin 3.8 2.0 - 4.0 g/dL A-G Ratio 0.9 (L) 1.1 - 2.2 SAMPLES BEING HELD Collection Time: 10/24/19  2:15 PM  
Result Value Ref Range SAMPLES BEING HELD  1 BLUE   
 COMMENT Add-on orders for these samples will be processed based on acceptable specimen integrity and analyte stability, which may vary by analyte. ETHYL ALCOHOL Collection Time: 10/24/19  2:15 PM  
Result Value Ref Range ALCOHOL(ETHYL),SERUM <10 <10 MG/DL  
EKG, 12 LEAD, INITIAL Collection Time: 10/24/19  2:16 PM  
Result Value Ref Range Ventricular Rate 59 BPM  
 Atrial Rate 59 BPM  
 P-R Interval 260 ms QRS Duration 88 ms Q-T Interval 440 ms QTC Calculation (Bezet) 435 ms Calculated P Axis 19 degrees Calculated R Axis -10 degrees Calculated T Axis 11 degrees Diagnosis Sinus bradycardia with 1st degree AV block Cannot rule out Anterior infarct , age undetermined URINALYSIS W/ REFLEX CULTURE Collection Time: 10/24/19  3:20 PM  
Result Value Ref Range Color YELLOW/STRAW Appearance CLEAR CLEAR Specific gravity 1.013 1.003 - 1.030    
 pH (UA) 7.0 5.0 - 8.0 Protein NEGATIVE  NEG mg/dL Glucose NEGATIVE  NEG mg/dL Ketone NEGATIVE  NEG mg/dL Bilirubin NEGATIVE  NEG Blood NEGATIVE  NEG Urobilinogen 0.2 0.2 - 1.0 EU/dL Nitrites NEGATIVE  NEG Leukocyte Esterase NEGATIVE  NEG    
 WBC 0-4 0 - 4 /hpf  
 RBC 0-5 0 - 5 /hpf Epithelial cells FEW FEW /lpf Bacteria NEGATIVE  NEG /hpf  
 UA:UC IF INDICATED CULTURE NOT INDICATED BY UA RESULT CNI Hyaline cast 0-2 0 - 5 /lpf Radiologic Studies -  
XR CHEST PORT Final Result IMPRESSION:  
  
Left lung base atelectasis versus pneumonia. No pneumothorax. Recommend PA and  
lateral chest views when the patient can better tolerate. CT HEAD WO CONT Final Result IMPRESSION: No acute intracranial hemorrhage, mass or infarct. CT Results  (Last 48 hours) 10/24/19 1437  CT HEAD WO CONT Final result Impression:  IMPRESSION: No acute intracranial hemorrhage, mass or infarct. Narrative:  INDICATION: Confusion/delirium, altered LOC, unexplained Exam: Noncontrast CT of the brain is performed with 5 mm collimation. CT dose reduction was achieved with the use of the standardized protocol  
tailored for this examination and automatic exposure control for dose  
modulation. Direct comparison made to prior CT dated September 2018. FINDINGS: There is mild, age-appropriate diffuse cortical atrophy. There is no  
acute intracranial hemorrhage, mass, mass effect or herniation. Ventricular  
system is normal. The gray-white matter differentiation is well-preserved. The  
mastoid air cells are well pneumatized. The visualized paranasal sinuses are  
normal.  
   
  
  
 
CXR Results  (Last 48 hours) 10/24/19 1459  XR CHEST PORT Final result Impression:  IMPRESSION:  
   
Left lung base atelectasis versus pneumonia. No pneumothorax. Recommend PA and  
lateral chest views when the patient can better tolerate. Narrative:  EXAM: XR CHEST PORT INDICATION: Episode of unresponsiveness this afternoon. Chronic hypertension. COMPARISON: Chest views on 7/28/2014. CT chest on 7/6/2009. TECHNIQUE: Upright portable chest AP view FINDINGS: Cardiac monitoring wires overlie the thorax. Borderline cardiac size  
is unchanged and accentuated by technique. Aortic arch is atherosclerotic but not enlarged. The pulmonary vasculature is within normal limits. Hazy opacity at the left lung base is nonspecific. While lung is clear. No  
pneumothorax. Bones are osteopenic. Surgical clips indicate cholecystectomy. Medical Decision Making I am the first provider for this patient. I reviewed the vital signs, available nursing notes, past medical history, past surgical history, family history and social history. Vital Signs-Reviewed the patient's vital signs. Patient Vitals for the past 12 hrs: 
 Temp Pulse Resp BP SpO2  
10/24/19 1400 98.1 °F (36.7 °C) 60 17 160/57 97 % Records Reviewed: Nursing Notes and Old Medical Records Provider Notes (Medical Decision Making): Concerning for likely postictal episode secondary to likely seizure, less likely would be basilar infarction. Will obtain a CT of the head. Disposition pending laboratory work-up and imaging. Anticipate likely hospitalization. At time of evaluation I did not feel he was a TPA candidate given that he does not have any lateralizing symptoms and his symptoms are most likely consistent with postictal period. ED Course:  
Initial assessment performed. The patients presenting problems have been discussed, and they are in agreement with the care plan formulated and outlined with them. I have encouraged them to ask questions as they arise throughout their visit. ED Course as of Oct 24 1929 Thu Oct 24, 2019  
1529 Per nursing he is more awake, interactive, no focal deficit. Went to re-eval, but was using bathroom. WIll try again soon [AR] ED Course User Index [AR] Gideon Mota, DO  
 
 
 
Critical Care Time:  
none Disposition: 
DISCHARGE NOTE Patients results have been reviewed with them.   Patient and/or family have verbally conveyed their understanding and agreement of the patient's signs, symptoms, diagnosis, treatment and prognosis and additionally agree to follow up as recommended or return to the Emergency Room should their condition change or have any new concerns prior to their follow-up appointment. Patient verbally agrees with the care-plan and verbally conveys that all of their questions have been answered. Discharge instructions have also been provided to the patient with some educational information regarding their diagnosis as well a list of reasons why they would want to return to the ER prior to their follow-up appointment should their condition change. PLAN: 
1. Discharge Medication List as of 10/24/2019  3:56 PM  
  
 
2. Follow-up Information Follow up With Specialties Details Why Contact Info Valeriy Kendallr, JUAN LUIS Neurology Schedule an appointment as soon as possible for a visit  [place] [address] [address] [facility] [address] [phone] [facility] EMERGENCY DEPT Emergency Medicine  If symptoms worsen [address] [facility] 
[phone] Return to ED if worse Diagnosis Clinical Impression: 1. Seizure ([address] 2. Transient alteration of awareness Attestations:  
This note was completed by Bea Cobian DO

## 2019-10-24 NOTE — DISCHARGE INSTRUCTIONS
Patient Education        Seizure: Care Instructions  Your Care Instructions    Seizures are caused by abnormal patterns of electrical signals in the brain. They are different for each person. Seizures can affect movement, speech, vision, or awareness. Some people have only slight shaking of a hand and do not pass out. Other people may pass out and have violent shaking of the whole body. Some people appear to stare into space. They are awake, but they can't respond normally. Later, they may not remember what happened. You may need tests to identify the type and cause of the seizures. A seizure may occur only once, or you may have them more than one time. Taking medicines as directed and following up with your doctor may help keep you from having more seizures. The doctor has checked you carefully, but problems can develop later. If you notice any problems or new symptoms, get medical treatment right away. Follow-up care is a key part of your treatment and safety. Be sure to make and go to all appointments, and call your doctor if you are having problems. It's also a good idea to know your test results and keep a list of the medicines you take. How can you care for yourself at home? · Be safe with medicines. Take your medicines exactly as prescribed. Call your doctor if you think you are having a problem with your medicine. · Do not do any activity that could be dangerous to you or others until your doctor says it is safe to do so. For example, do not drive a car, operate machinery, swim, or climb ladders. · Be sure that anyone treating you for any health problem knows that you have had a seizure and what medicines you are taking for it. · Identify and avoid things that may make you more likely to have a seizure. These may include lack of sleep, alcohol or drug use, stress, or not eating. · Make sure you go to your follow-up appointment. When should you call for help?   Call 911 anytime you think you may need emergency care. For example, call if:    · You have another seizure.     · You have more than one seizure in 24 hours.     · You have new symptoms, such as trouble walking, speaking, or thinking clearly.    Call your doctor now or seek immediate medical care if:    · You are not acting normally.    Watch closely for changes in your health, and be sure to contact your doctor if you have any problems. Where can you learn more? Go to http://svetlana-brooke.info/. Enter D697 in the search box to learn more about \"Seizure: Care Instructions. \"  Current as of: March 28, 2019  Content Version: 12.2  © 5711-4089 [employer]. Care instructions adapted under license by [employer] (which disclaims liability or warranty for this information). If you have questions about a medical condition or this instruction, always ask your healthcare professional. [address] any warranty or liability for your use of this information.

## 2019-10-24 NOTE — ED NOTES
Jill Service,  
 has done a bedside review of the discharge instructions. The patient is in understanding. The patients line(s) are removed. The patient is dressed, and belongings together for discharge.

## 2019-10-25 PROBLEM — I63.9 CEREBROVASCULAR ACCIDENT (CVA) (HCC): Status: ACTIVE | Noted: 2019-01-01

## 2019-10-25 NOTE — PROGRESS NOTES
ELECTROENCEPHALOGRAM REPORT 
 
HISTORY: Patient is a 35-year-old male who is being evaluated for altered 
mental status, stroke DESCRIPTION: This is an 18-channel EEG performed on a drowsy patient. The dominant posterior background rhythm consists of 
medium-voltage rhythms in the 7 Hz frequency range out of the posterior 
head region. Photic stimulation does not elicit a significant 
driving response. Hyperventilation was not performed. ELECTROENCEPHALOGRAM SUMMARY: Mildly abnormal EEG due to mild slowing of the background rhythms. CLINICAL INTERPRETATION: This EEG is suggestive of some mild generalized encephalopathic process, nonspecific in type. This may be related to underlying structural brain injury and/or toxic/metabolic abnormality. No 
clearly lateralizing or epileptiform features were seen. Please correlate 
clinically. Dewey Forward, DO 
10/25/19

## 2019-10-25 NOTE — PROGRESS NOTES
Hospitalist Progress Note William Bray MD 
Answering service: [phone] OR [phone] from in house phone Date of Service:  10/25/2019 NAME:  Maricel Moreira Sr 
:  1927 MRN:  [id number] Admission Summary:  
 
Elba Hernández is a 80 y.o. male Patient is a 60-year-old male who is independent in the activities of daily life at baseline, and lives at home with his daughter. Patient does have a history of seizures, and these episodes are characterized by blank stares and not responding to the environment around him. Patients daughter states that patient never had convulsions or shaking. Patient had such an episode yesterday afternoon. Patients daughter states that however, this is the first seizure-like episode in a long while. He usually does not have seizures more than once every year or every few years. Patients daughter states that patient became responsive after arrival to the emergency department and head CT was negative, and he was discharged home. Once he got home that evening, patient  became unresponsive again. Patients daughter called her son who is their next-door neighbor. He came over. Initially, they thought that he was having another seizure. But then it appeared that he seem to want to communicate, but could not. They asked him questions, and he got it yes and no. They asked him if he needed to go to the bathroom, and he answered yes. However, he appeared not to be able to move his arms and legs. They didnt notice that one side was weaker than the other. 
  
Upon arrival to the emergency department at [facility], patient continued to be aphasic. 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 Patient was then transferred to [facility] where he underwent left MCA distal M2 mechanical thrombectomy. Interval history / Subjective: Patient is lethargic but open his eyes to voice Assessment & Plan:  
 
Aphasic due to acute [facility] M2 occlusion 
-s/p thrombectomy left MCA distal M2 
-on cardene gtt to keep SBP <160 
-on aspirin 300 mg rectal, keppra 
-continue neuro check 
-Neurointerventional surgeon and neurologist on boar Paroxysmal A Fib, rat controlled -he takes cardizem and aspirin 81 mg at home 
-continue cardiac monitoring Seizure -EEG 10/25 mildly abnormal EEG due to mild slowing of the background rhythms. 
-on keppra  
-seizure precaution 
-neurologist on board HTN 
-on cardene gtt, coreg, monitor BP Hx of CAD 
-on coreg, aspirin suppositor Anemia of chronic disease  
-stable, monitor H/H Mild hypokalemia 
-repeat bmp in am 
 
Hx of hypercholesterolemia  
-will resume statin possible vis NGT Moderate Protein-Calorie Malnutrition 
-nutrition consult Code status: Full Code DVT prophylaxis: SCD Care Plan discussed with: Patient/Family and Nurse Disposition: TBD Hospital Problems  Date Reviewed: 10/8/2019 Codes Class Noted POA Cerebrovascular accident (CVA) [address] [address] ICD-10-CM: I63.9 ICD-9-CM: 434.91  10/25/2019 Unknown Vital Signs:  
 Last 24hrs VS reviewed since prior progress note. Most recent are: 
Visit Vitals /60 (BP 1 Location: Left arm, BP Patient Position: At rest) Pulse 74 Temp 97.9 °F (36.6 °C) Resp 14 Ht 5' 8\" (1.727 m) Wt 51.1 kg (112 lb 10.5 oz) SpO2 94% BMI 17.13 kg/m² Intake/Output Summary (Last 24 hours) at 10/25/2019 1617 Last data filed at 10/25/2019 1600 Gross per 24 hour Intake 5721.66 ml Output 1315 ml Net 4406.66 ml Physical Examination:  
 
 
     
Constitutional:  No acute distress, cooperative, pleasant ENT:  Oral mucous moist, oropharynx benign. Resp:  CTA bilaterally. No wheezing/rhonchi/rales. No accessory muscle use CV:  Irregular rhythm, normal rate, no murmurs, gallops, rubs GI:  Soft, non distended, non tender. normoactive bowel sounds, no hepatosplenomegaly Musculoskeletal:  No edema Neurologic:  Lethargic but wakeful, non verbal  
  Skin:  Good turgor, no rashes or ulcers Data Review:  
 Review and/or order of clinical lab test 
Review and/or order of tests in the radiology section of CPT Review and/or order of tests in the medicine section of [facility] Labs:  
 
Recent Labs 10/25/19 
0101 10/24/19 
1415 WBC 7.3 7.3 HGB 11.6* 11.9*  
HCT 34.9* 36.7  177 Recent Labs 10/25/19 
0101 10/24/19 
1415 * 136  
K 3.7 4.1  104 CO2 27 27 BUN 25* 27* CREA 1.16 1.05  
* 93  
CA 8.7 8.5 Recent Labs 10/25/19 
0101 10/24/19 
1415 SGOT 12* 16 ALT 16 20 AP 97 103 TBILI 0.6 0.3 TP 6.8 7.3 ALB 3.4* 3.5 GLOB 3.4 3.8 Recent Labs 10/25/19 
0101 INR 1.0 PTP 10.4 APTT 25.4 No results for input(s): FE, TIBC, PSAT, FERR in the last 72 hours. No results found for: FOL, RBCF No results for input(s): PH, PCO2, PO2 in the last 72 hours. No results for input(s): CPK, CKNDX, TROIQ in the last 72 hours. No lab exists for component: CPKMB No results found for: CHOL, CHOLX, CHLST, CHOLV, HDL, HDLP, LDL, LDLC, DLDLP, TGLX, TRIGL, TRIGP, CHHD, CHHDX Lab Results Component Value Date/Time Glucose (POC) 117 (H) 10/25/2019 01:39 AM  
 Glucose (POC) 102 (H) 10/24/2019 02:00 PM  
 Glucose (POC) 149 (H) 09/29/2018 06:39 AM  
 Glucose (POC) 102 10/02/2011 09:22 PM  
 Glucose (POC) 138 (H) 10/02/2011 04:30 PM  
 
Lab Results Component Value Date/Time  Color YELLOW/STRAW 10/24/2019 03:20 PM  
 Appearance CLEAR 10/24/2019 03:20 PM  
 Specific gravity 1.013 10/24/2019 03:20 PM  
 pH (UA) 7.0 10/24/2019 03:20 PM  
 Protein NEGATIVE  10/24/2019 03:20 PM  
 Glucose NEGATIVE  10/24/2019 03:20 PM  
 Ketone NEGATIVE  10/24/2019 03:20 PM  
 Bilirubin NEGATIVE  10/24/2019 03:20 PM  
 Urobilinogen 0.2 10/24/2019 03:20 PM  
 Nitrites NEGATIVE  10/24/2019 03:20 PM  
 Leukocyte Esterase NEGATIVE  10/24/2019 03:20 PM  
 Epithelial cells FEW 10/24/2019 03:20 PM  
 Bacteria NEGATIVE  10/24/2019 03:20 PM  
 WBC 0-4 10/24/2019 03:20 PM  
 RBC 0-5 10/24/2019 03:20 PM  
 
 
 
Medications Reviewed:  
 
Current Facility-Administered Medications Medication Dose Route Frequency  sodium chloride (NS) flush 5-40 mL  5-40 mL IntraVENous Q8H  
 sodium chloride (NS) flush 5-40 mL  5-40 mL IntraVENous PRN  
 acetaminophen (TYLENOL) tablet 650 mg  650 mg Oral Q4H PRN  
 ondansetron (ZOFRAN) injection 4 mg  4 mg IntraVENous Q4H PRN  
 lactated Ringers infusion  50 mL/hr IntraVENous CONTINUOUS  
 carvedilol (COREG) tablet 3.125 mg  3.125 mg Oral BID WITH MEALS  
 levETIRAcetam (KEPPRA) 1,500 mg in 0.9% sodium chloride 100 mL IVPB  1,500 mg IntraVENous Q12H  
 niCARdipine (CARDENE) 25 mg in 0.9% sodium chloride 250 mL infusion  0-15 mg/hr IntraVENous TITRATE  hydrALAZINE (APRESOLINE) 20 mg/mL injection 20 mg  20 mg IntraVENous Q6H PRN  
 [START ON 10/26/2019] pantoprazole (PROTONIX) 40 mg in sodium chloride 0.9% 10 mL injection  40 mg IntraVENous DAILY  
 
______________________________________________________________________ EXPECTED LENGTH OF STAY: - - - 
ACTUAL LENGTH OF STAY:          0 Mervat Nair MD

## 2019-10-25 NOTE — PROGRESS NOTES
Transitions of care TBD pending medical progress Reason for Admission:  Presented with right sided weakness and difficulty speaking To IR for Mechanical Thrombectomy of Left MCA distal M2 on 10/25/19 with Dr. Yamel Dixon  
   
RRAT Score:   20/ moderate Resources/supports as identified by patient/family:   Lives with daughter Donte Gillis  [phone] Top Challenges facing patient (as identified by patient/family and CM): Finances/Medication cost?    Confirmed insurance to be Medicare A,B and [facility] secondary Transportation? Daughter drives Support system or lack thereof? Lives with daughter and son in law Living arrangements? See above Self-care/ADLs/Cognition? Was independent prior to admission Current Advanced Directive/Advance Care Plan:  Not on file Plan for utilizing home health:   TBD Care manager met with patient's daughter Donte Gillis to introduce self and explain role. She confirmed his PCP to be Dr Carson Smith and sees her every three months and uses the local [employer] and his pharmacy. Patient has no previous equipment needs and had home health years ago, she cannot recall the name of the agency. Care management will follow for transitions of care. Brianda Teague RN,CRM Care Management Interventions PCP Verified by CM: Yes(Dr Pritchett) MyChart Signup: No 
Discharge Durable Medical Equipment: No 
Physical Therapy Consult: No 
Occupational Therapy Consult: No 
Speech Therapy Consult: No 
Current Support Network: Relative's Home(Daughter Isabel C: [phone])

## 2019-10-25 NOTE — PROGRESS NOTES
0500: TRANSFER - IN REPORT: 
 
Verbal report received from [place] (name) on [facility]  being received from Angio (unit) for routine progression of care Report consisted of patients Situation, Background, Assessment and  
Recommendations(SBAR). Information from the following report(s) SBAR, Kardex, Procedure Summary, Intake/Output, MAR, Accordion, Recent Results, Cardiac Rhythm NSR, Macario, Sinus Tach, PVCs, PACs, Bigeminy and Alarm Parameters  was reviewed with the receiving nurse. Opportunity for questions and clarification was provided. Assessment completed upon patients arrival to unit and care assumed. Pt is NSR on monitor. On Cardene gtt to maintain -140. Opens eyes spontaneously, pupils equal, reactive 2s, pt is aphasic but will nod appropriately. Right side weaker than left. LLOYD spontaneously. 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 Primary Nurse Alise Lacy and Vita Maynard RN performed a dual skin assessment on this patient No impairment noted Sreekanth score is 13 
 
0730: Bedside and Verbal shift change report given to Tl Lambert RN (oncoming nurse) by Daxa Starks RN (offgoing nurse). Report included the following information SBAR, Kardex, Intake/Output, MAR, Accordion, Recent Results, Cardiac Rhythm NSR, PJCs, afib and Alarm Parameters .

## 2019-10-25 NOTE — PROGRESS NOTES
Neurocritical Care Brief Progress Note: 
 
Rounded on pt post angio. Right groin arteriotomy site groin site soft, C/D/I, with no active bleeding or drainage. Pt is now moving right side but is still very weak. Pt opens eyes to voices and nods head yes/no appropriately. NIHSS:   
  1a-LOC:0 
  1b-Month/Age:2 
  1c-Open/Close Hand:2 2-Best Gaze:1 3-Visual Fields:3 
  4-Facial Palsy:0 
  5a-Left Arm:1 
  5b-Right Arm:3 
  6a-Left Le 
  6b-Right Leg:3 
  7-Limb Ataxia:2 
  8-Sensory:2 
  9-Best Language:2 
  10-Dysarthria:2 
  11-Extinction/Inattention:2 
TOTAL SCORE:27 
(pre-procedure 31) Harleen Holm NP Neurocritical Care Nurse Practitioner [phone]

## 2019-10-25 NOTE — ANESTHESIA PREPROCEDURE EVALUATION
Relevant Problems No relevant active problems Anesthetic History No history of anesthetic complications Review of Systems / Medical History Patient summary reviewed, nursing notes reviewed and pertinent labs reviewed Pulmonary Within defined limits Neuro/Psych  
 
seizures CVA Cardiovascular Hypertension Dysrhythmias : atrial fibrillation CAD and cardiac stents GI/Hepatic/Renal 
Within defined limits Endo/Other Within defined limits Other Findings Physical Exam 
 
Airway Mallampati: II 
TM Distance: > 6 cm Neck ROM: normal range of motion Mouth opening: Normal 
 
 Cardiovascular Regular rate and rhythm,  S1 and S2 normal,  no murmur, click, rub, or gallop Dental 
No notable dental hx Pulmonary Breath sounds clear to auscultation Abdominal 
GI exam deferred Other Findings Anesthetic Plan ASA: 3, emergent Anesthesia type: MAC Monitoring Plan: Arterial line Induction: Intravenous Emergency case

## 2019-10-25 NOTE — PROGRESS NOTES
0730- Bedside and Verbal shift change report given to [id number] [facility] (nursing student) (oncoming nurse) by Parag Marroquin (offgoing nurse). Report included the following information SBAR, Kardex, ED Summary, Procedure Summary, Intake/Output, MAR, Recent Results and Cardiac Rhythm AFib. 
0945- Spoke to radiologist and Dr. Elias Baumgarten about KUB (possible placement in hiatal hernia) will try to replace NGT. Holding PO meds until enteral  access is obtained. [phone] Dobhoff placed  Pt to CT with RN and SN on monitor. 36- Dr. Black Friendly at bedside evaluating patient, still no enteral access to restart home meds. Will start IV keppra. EEG also ordered 1200-After 3rd attempt of placing dobhoff, call rec. from radiologist regarding incorrect positioning of NGT,  removed by RN 
Fain Dakins Dr. Elias Baumgarten about BP increasing, prn medications added and will restart cardene if needed. 1530- Bedside and Verbal shift change report given to [address] [facility] [address] (oncoming nurse) by Elliot Steven RN (offgoing nurse). Report included the following information SBAR, Kardex, ED Summary, Procedure Summary, Intake/Output, MAR, Recent Results and Cardiac Rhythm AFib.

## 2019-10-25 NOTE — ED PROVIDER NOTES
EMERGENCY DEPARTMENT HISTORY AND PHYSICAL EXAM 
     
 
Date: 10/25/2019 Patient Name: Amos Sam History of Presenting Illness Chief Complaint Patient presents with  Extremity Weakness  Aphasia  
  started about 10PM today History Provided By: EMS 
 
HPI: Amos Sam is a 80 y.o. male, pmhx epilepsy, CAD, hypertension, high cholesterol, who presents EMS to the ED c/o decreased responsiveness At this for complete unresponsiveness and a aphasia. He was seen early her in the ER for having an episode of seizure which did not improve as he does normally. He was evaluated with CT and labs and everything was normal and he was sent home. Per EMS approximately 1 hour ago patient became aphasic and stopped responding and developed right-sided weakness. In route his Accu-Chek was 103. PCP: Shadi Sigala NP Allergies: No known medication allergies Social Hx: Former tobacco, -EtOH, -Illicit Drugs There are no other complaints, changes, or physical findings at this time. Current Outpatient Medications Medication Sig Dispense Refill  levETIRAcetam (KEPPRA) 500 mg tablet take 3 tablets by mouth twice a day 540 Tab 1  
 galantamine (RAZADYNE) 8 mg SR capsule take 1 capsule by mouth daily before BREAKFAST 90 Cap 1  dilTIAZem CD (CARDIZEM CD) 180 mg ER capsule Take 1 Cap by mouth daily. 90 Cap 3  carvedilol (COREG) 3.125 mg tablet Take 1 Tab by mouth two (2) times daily (with meals). 90 Tab 0  
 cyanocobalamin (VITAMIN B-12) 2,500 mcg sublingual tablet Take 5,000 mcg by mouth daily.  ASCORBATE CALCIUM (VITAMIN C PO) Take  by mouth daily.  Calcium-Cholecalciferol, D3, 600 mg(1,500mg) -400 unit chew Take  by mouth daily.  aspirin 81 mg chewable tablet Take 81 mg by mouth nightly.  rosuvastatin (CRESTOR) 5 mg tablet Take 5 mg by mouth daily. Past History Past Medical History: 
Past Medical History:  
Diagnosis Date  CAD (coronary artery disease)  Hypercholesteremia  Hypertension  Seizures ([address]   
 last seizure in 2011 per pt Past Surgical History: 
Past Surgical History:  
Procedure Laterality Date  CARDIAC SURG PROCEDURE UNLIST    
 cardiac stents [address]  HX CHOLECYSTECTOMY  2007  [place] EGD BALLOON DILATION ESOPHAGUS <30 MM DIAM  11/21/2012 Family History: 
Family History Problem Relation Age of Onset  Alcohol abuse Mother Social History: 
Social History Tobacco Use  Smoking status: Former Smoker Types: Cigarettes  Smokeless tobacco: Never Used  Tobacco comment: years ago- not sure when Substance Use Topics  Alcohol use: No  
 Drug use: No  
 
 
Allergies: 
No Known Allergies Review of Systems Review of Systems Unable to perform ROS: Mental status change Physical Exam  
Physical Exam  
Constitutional: He appears well-developed and well-nourished. This is a thin, pale, elderly male currently in minimal distress HENT:  
Head: Normocephalic and atraumatic. Mouth/Throat: Oropharynx is clear and moist.  
Eyes: Pupils are equal, round, and reactive to light. Conjunctivae and EOM are normal. Right eye exhibits no discharge. Left eye exhibits no discharge. Neck: Normal range of motion. Neck supple. Cardiovascular: Normal rate, regular rhythm and normal heart sounds. No murmur heard. Pulmonary/Chest: Effort normal and breath sounds normal. No respiratory distress. He has no wheezes. He has no rales. Abdominal: Soft. Bowel sounds are normal. He exhibits no distension. There is no tenderness. Musculoskeletal: Normal range of motion. He exhibits no edema. Neurological: No cranial nerve deficit. He exhibits normal muscle tone. Patient is awake, currently staring in space and not responsive. He does not follow commands thus unable to complete a neurologic assessment. Pupils are equal and reactive and he does not have obvious gaze preference. Skin: Skin is warm and dry. No rash noted. He is not diaphoretic. Nursing note and vitals reviewed. Diagnostic Study Results Labs - Recent Results (from the past 12 hour(s)) GLUCOSE, POC Collection Time: 10/24/19  2:00 PM  
Result Value Ref Range Glucose (POC) 102 (H) 65 - 100 mg/dL Performed by Kiana Fajardo \"Mayo\" CBC WITH AUTOMATED DIFF Collection Time: 10/24/19  2:15 PM  
Result Value Ref Range WBC 7.3 4.1 - 11.1 K/uL  
 RBC 3.59 (L) 4.10 - 5.70 M/uL  
 HGB 11.9 (L) 12.1 - 17.0 g/dL HCT 36.7 36.6 - 50.3 % .2 (H) 80.0 - 99.0 FL  
 MCH 33.1 26.0 - 34.0 PG  
 MCHC 32.4 30.0 - 36.5 g/dL  
 RDW 12.3 11.5 - 14.5 % PLATELET 800 001 - 382 K/uL MPV 10.9 8.9 - 12.9 FL  
 NRBC 0.0 0  WBC ABSOLUTE NRBC 0.00 0.00 - 0.01 K/uL NEUTROPHILS 58 32 - 75 % LYMPHOCYTES 23 12 - 49 % MONOCYTES 14 (H) 5 - 13 % EOSINOPHILS 4 0 - 7 % BASOPHILS 1 0 - 1 % IMMATURE GRANULOCYTES 0 0.0 - 0.5 % ABS. NEUTROPHILS 4.3 1.8 - 8.0 K/UL  
 ABS. LYMPHOCYTES 1.7 0.8 - 3.5 K/UL  
 ABS. MONOCYTES 1.0 0.0 - 1.0 K/UL  
 ABS. EOSINOPHILS 0.3 0.0 - 0.4 K/UL  
 ABS. BASOPHILS 0.0 0.0 - 0.1 K/UL  
 ABS. IMM. GRANS. 0.0 0.00 - 0.04 K/UL  
 DF AUTOMATED METABOLIC PANEL, COMPREHENSIVE Collection Time: 10/24/19  2:15 PM  
Result Value Ref Range Sodium 136 136 - 145 mmol/L Potassium 4.1 3.5 - 5.1 mmol/L Chloride 104 97 - 108 mmol/L  
 CO2 27 21 - 32 mmol/L Anion gap 5 5 - 15 mmol/L Glucose 93 65 - 100 mg/dL BUN 27 (H) 6 - 20 MG/DL Creatinine 1.05 0.70 - 1.30 MG/DL  
 BUN/Creatinine ratio 26 (H) 12 - 20 GFR est AA >60 >60 ml/min/1.73m2 GFR est non-AA >60 >60 ml/min/1.73m2 Calcium 8.5 8.5 - 10.1 MG/DL Bilirubin, total 0.3 0.2 - 1.0 MG/DL  
 ALT (SGPT) 20 12 - 78 U/L  
 AST (SGOT) 16 15 - 37 U/L Alk.  phosphatase 103 45 - 117 U/L  
 Protein, total 7.3 6.4 - 8.2 g/dL Albumin 3.5 3.5 - 5.0 g/dL Globulin 3.8 2.0 - 4.0 g/dL A-G Ratio 0.9 (L) 1.1 - 2.2 SAMPLES BEING HELD Collection Time: 10/24/19  2:15 PM  
Result Value Ref Range SAMPLES BEING HELD  1 BLUE   
 COMMENT Add-on orders for these samples will be processed based on acceptable specimen integrity and analyte stability, which may vary by analyte. ETHYL ALCOHOL Collection Time: 10/24/19  2:15 PM  
Result Value Ref Range ALCOHOL(ETHYL),SERUM <10 <10 MG/DL  
EKG, 12 LEAD, INITIAL Collection Time: 10/24/19  2:16 PM  
Result Value Ref Range Ventricular Rate 59 BPM  
 Atrial Rate 59 BPM  
 P-R Interval 260 ms QRS Duration 88 ms Q-T Interval 440 ms QTC Calculation (Bezet) 435 ms Calculated P Axis 19 degrees Calculated R Axis -10 degrees Calculated T Axis 11 degrees Diagnosis Sinus bradycardia with 1st degree AV block Cannot rule out Anterior infarct , age undetermined URINALYSIS W/ REFLEX CULTURE Collection Time: 10/24/19  3:20 PM  
Result Value Ref Range Color YELLOW/STRAW Appearance CLEAR CLEAR Specific gravity 1.013 1.003 - 1.030    
 pH (UA) 7.0 5.0 - 8.0 Protein NEGATIVE  NEG mg/dL Glucose NEGATIVE  NEG mg/dL Ketone NEGATIVE  NEG mg/dL Bilirubin NEGATIVE  NEG Blood NEGATIVE  NEG Urobilinogen 0.2 0.2 - 1.0 EU/dL Nitrites NEGATIVE  NEG Leukocyte Esterase NEGATIVE  NEG    
 WBC 0-4 0 - 4 /hpf  
 RBC 0-5 0 - 5 /hpf Epithelial cells FEW FEW /lpf Bacteria NEGATIVE  NEG /hpf  
 UA:UC IF INDICATED CULTURE NOT INDICATED BY UA RESULT CNI Hyaline cast 0-2 0 - 5 /lpf  
CBC WITH AUTOMATED DIFF Collection Time: 10/25/19  1:01 AM  
Result Value Ref Range WBC 7.3 4.1 - 11.1 K/uL  
 RBC 3.45 (L) 4.10 - 5.70 M/uL  
 HGB 11.6 (L) 12.1 - 17.0 g/dL HCT 34.9 (L) 36.6 - 50.3 % .2 (H) 80.0 - 99.0 FL  
 MCH 33.6 26.0 - 34.0 PG  
 MCHC 33.2 30.0 - 36.5 g/dL RDW 12.2 11.5 - 14.5 % PLATELET 427 293 - 639 K/uL MPV 10.4 8.9 - 12.9 FL  
 NRBC 0.0 0  WBC ABSOLUTE NRBC 0.00 0.00 - 0.01 K/uL NEUTROPHILS 62 32 - 75 % LYMPHOCYTES 22 12 - 49 % MONOCYTES 12 5 - 13 % EOSINOPHILS 3 0 - 7 % BASOPHILS 1 0 - 1 % IMMATURE GRANULOCYTES 0 0.0 - 0.5 % ABS. NEUTROPHILS 4.5 1.8 - 8.0 K/UL  
 ABS. LYMPHOCYTES 1.6 0.8 - 3.5 K/UL  
 ABS. MONOCYTES 0.9 0.0 - 1.0 K/UL  
 ABS. EOSINOPHILS 0.2 0.0 - 0.4 K/UL  
 ABS. BASOPHILS 0.1 0.0 - 0.1 K/UL  
 ABS. IMM. GRANS. 0.0 0.00 - 0.04 K/UL  
 DF AUTOMATED PROTHROMBIN TIME + INR Collection Time: 10/25/19  1:01 AM  
Result Value Ref Range INR 1.0 0.9 - 1.1 Prothrombin time 10.4 9.0 - 11.1 sec PTT Collection Time: 10/25/19  1:01 AM  
Result Value Ref Range aPTT 25.4 22.1 - 32.0 sec  
 aPTT, therapeutic range     58.0 - 77.0 SECS  
GLUCOSE, POC Collection Time: 10/25/19  1:39 AM  
Result Value Ref Range Glucose (POC) 117 (H) 65 - 100 mg/dL Performed by Christian Pop RN   
 
 
Radiologic Studies -  
CTA CODE NEURO HEAD AND NECK W CONT  
  
  
CT PERF W CBF  
  
  
CT CODE NEURO HEAD WO CONTRAST Final Result IMPRESSION: No acute intracranial hemorrhage, mass or infarct. CT Results  (Last 48 hours) 10/25/19 0039  CTA CODE NEURO HEAD AND NECK W CONT Preliminary result Narrative:  *PRELIMINARY REPORT* Left M2 occlusion within sylvian fissure Preliminary report was provided by Dr. Elsi Santillan, the on-call radiologist, at  
01:04 Final report to follow. *END PRELIMINARY REPORT*  
   
   
   
   
   
   
  
 10/25/19 0039  CT PERF W CBF Preliminary result Narrative:  *PRELIMINARY REPORT* Left MCA territory perfusion abnormality compatible with infarct. Preliminary report was provided by Dr. Elsi Santillan, the on-call radiologist, at  
01:03 Final report to follow. *END PRELIMINARY REPORT* 10/25/19 0020  CT CODE NEURO HEAD WO CONTRAST Final result Impression:  IMPRESSION: No acute intracranial hemorrhage, mass or infarct. Narrative:  EXAM: CT CODE NEURO HEAD WO CONTRAST INDICATION: Code Stroke COMPARISON: CT 10/24/2019. CONTRAST: None. TECHNIQUE: Unenhanced CT of the head was performed using 5 mm images. Brain and  
bone windows were generated. CT dose reduction was achieved through use of a  
standardized protocol tailored for this examination and automatic exposure  
control for dose modulation. FINDINGS: There is no significant change in mild, age-appropriate diffuse  
cortical atrophy. There is no acute intracranial hemorrhage, mass, mass effect  
or herniation. Ventricular system is normal. The gray-white matter  
differentiation is well-preserved. The mastoid air cells are well pneumatized. The visualized paranasal sinuses are normal.   
   
  
 10/24/19 1437  CT HEAD WO CONT Final result Impression:  IMPRESSION: No acute intracranial hemorrhage, mass or infarct. Narrative:  INDICATION: Confusion/delirium, altered LOC, unexplained Exam: Noncontrast CT of the brain is performed with 5 mm collimation. CT dose reduction was achieved with the use of the standardized protocol  
tailored for this examination and automatic exposure control for dose  
modulation. Direct comparison made to prior CT dated September 2018. FINDINGS: There is mild, age-appropriate diffuse cortical atrophy. There is no  
acute intracranial hemorrhage, mass, mass effect or herniation. Ventricular  
system is normal. The gray-white matter differentiation is well-preserved. The  
mastoid air cells are well pneumatized. The visualized paranasal sinuses are  
normal.  
   
  
  
 
CXR Results  (Last 48 hours) 10/24/19 1459  XR CHEST PORT Final result  Impression:  IMPRESSION:  
   
 Left lung base atelectasis versus pneumonia. No pneumothorax. Recommend PA and  
lateral chest views when the patient can better tolerate. Narrative:  EXAM: XR CHEST PORT INDICATION: Episode of unresponsiveness this afternoon. Chronic hypertension. COMPARISON: Chest views on 7/28/2014. CT chest on 7/6/2009. TECHNIQUE: Upright portable chest AP view FINDINGS: Cardiac monitoring wires overlie the thorax. Borderline cardiac size  
is unchanged and accentuated by technique. Aortic arch is atherosclerotic but  
not enlarged. The pulmonary vasculature is within normal limits. Hazy opacity at the left lung base is nonspecific. While lung is clear. No  
pneumothorax. Bones are osteopenic. Surgical clips indicate cholecystectomy. Medical Decision Making I am the first provider for this patient. I reviewed the vital signs, available nursing notes, past medical history, past surgical history, family history and social history. Vital Signs-Reviewed the patient's vital signs. Patient Vitals for the past 12 hrs: 
 Pulse Resp BP SpO2  
10/25/19 0127    96 % 10/25/19 0100   132/81   
10/25/19 0029 62 18 (!) 159/100 96 % Pulse Oximetry Analysis - 96% on RA Cardiac Monitor:  
Rate: 62bpm 
Rhythm: Normal Sinus Rhythm Records Reviewed: Nursing Notes, Old Medical Records, Ambulance Run Sheet, Previous Radiology Studies and Previous Laboratory Studies Provider Notes (Medical Decision Making): MDM: Elderly male presenting with dense deficit, tPA candidate? Await family for history. Pt taken to CT for code 1 stroke. ED Course:  
Initial assessment performed. The patients presenting problems have been discussed, and they are in agreement with the care plan formulated and outlined with them. I have encouraged them to ask questions as they arise throughout their visit.  
 
PROGRESS NOTE: 
01:05AM 
 Pt unchanged with stable VS. LKW according to his family is 7:30. Pt went to sleep and then awoke aphasic with weakness. Not a tPA candidate. Staying in CT for angiogram.  
 
01:15AM 
Discussed with radiology as well as tele-neurology, call placed to Neuro IR for possible embolectomy CONSULT NOTE:  
1:20 AM 
Surya Nuñez MD spoke with Dr Zara Díaz, Specialty: Neuro interventionalist 
Discussed pt's hx, disposition, and available diagnostic and imaging results. Reviewed care plans. Consultant agrees with plans as outlined. He said it sounds like patient is a candidate upon review of his imaging. Requesting to discuss with family if this was something they would like to proceed with given his age. Sukhdeep James 1:30 AM 
Talked with family at bedside. His durable POA is with him with his forms which have been reviewed regarding end-of-life wishes. Given that this procedure cannot only sustain his life but actually reverse his deficits I would recommend it at this time and his family and POA Bhaskar Yo agrees. Recontacted Dr. Xin Fabian to let him know their wishes. Charge nurse called to arrange lights and sirens. Blood pressure currently 170/90 CONSULT NOTE:  
1:40 AM 
Surya Nuñez MD spoke with Dr Manolo Markham, Specialty: ER at [facility] Discussed pt's hx, disposition, and available diagnostic and imaging results. Reviewed care plans. Consultant agrees with plans as outlined. She will accept the patient in transfer. Critical Care Time: CRITICAL CARE NOTE : 
02:15AM 
IMPENDING DETERIORATION -Airway, Respiratory, Cardiovascular, CNS, Metabolic, Renal and Hepatic ASSOCIATED RISK FACTORS - Hypotension, Shock, Bleeding, Metabolic changes and CNS Decompensation MANAGEMENT- Bedside Assessment, Supervision of Care and Transfer INTERPRETATION -  Xrays, CT Scan and Blood Pressure INTERVENTIONS - hemodynamic mngmt, Neurologic interventions  and Metobolic interventions CASE REVIEW - Hospitalist, Medical Sub-Specialist, Nursing and Family TREATMENT RESPONSE -Stable PERFORMED BY - Self NOTES   : 
I have spent 45 minutes of critical care time involved in lab review, consultations with specialist, family decision- making, bedside attention and documentation. During this entire length of time I was immediately available to the patient. Mikhail Leija MD 
 
 
 
Diagnosis Clinical Impression: 1. Acute embolic stroke ([address] PLAN: 
1. Transfer to [address] [place] [address] 2. Neuro interventionalist evaluation for possible procedure Please note, this dictation was completed with [employer], the computer voice recognition software. Quite often unanticipated grammatical, syntax, homophones, and other interpretive errors are inadvertently transcribed by the computer software. Please disregard these errors. Please excuse any errors that have escaped final proof reading.

## 2019-10-25 NOTE — H&P
History and Physical 
 
Patient: Sailaja Velazquez MRN: [id number]  SSN: [id number] YOB: 1927  Age: 80 y.o. Sex: male Subjective:  
  
Sailaja Velazquez is a 80 y.o. male Patient is a 27-year-old male who is independent in the activities of daily life at baseline, and lives at home with his daughter. Patient does have a history of seizures, and these episodes are characterized by blank stares and not responding to the environment around him. Patients daughter states that patient never had convulsions or shaking. Patient had such an episode yesterday afternoon. Patients daughter states that however, this is the first seizure-like episode in a long while. He usually does not have seizures more than once every year or every few years. Patients daughter states that patient became responsive after arrival to the emergency department and head CT was negative, and he was discharged home. Once he got home that evening, patient  became unresponsive again. Patients daughter called her son who is their next-door neighbor. He came over. Initially, they thought that he was having another seizure. But then it appeared that he seem to want to communicate, but could not. They asked him questions, and he got it yes and no. They asked him if he needed to go to the bathroom, and he answered yes. However, he appeared not to be able to move his arms and legs. They didnt notice that one side was weaker than the other. Upon arrival to the emergency department at [facility], patient continued to be aphasic. 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 Patient was then transferred to [facility] where he underwent left MCA distal M2 mechanical thrombectomy. Per neurointerventional surgery, Ms. Andrea Mora,  nurse practitioner, patient was completely flaccid upon arrival to the radiology suite.  After thrombectomy, he started moving his right hand and right leg. He was able to close is it to, but could not show strength otherwise. He did make eye contact when his name was called. He remains aphasic. During my exam however, patient is somnolent -- he did open his eyes, but did not turn towards me. He also did not  squeeze his fist when my hand was placed in it. Family is at bedside. Past Medical History:  
Diagnosis Date  CAD (coronary artery disease)  Hypercholesteremia  Hypertension  Seizures ([address]   
 last seizure in 2011 per pt Past Surgical History:  
Procedure Laterality Date  CARDIAC SURG PROCEDURE UNLIST    
 cardiac stents [address]  HX CHOLECYSTECTOMY  2007  [facility]  [facility]  MA EGD BALLOON DILATION ESOPHAGUS <30 MM DIAM  11/21/2012 Family History Problem Relation Age of Onset  Alcohol abuse Mother Social History Tobacco Use  Smoking status: Former Smoker Types: Cigarettes  Smokeless tobacco: Never Used  Tobacco comment: years ago- not sure when Substance Use Topics  Alcohol use: No  
  
Prior to Admission medications Medication Sig Start Date End Date Taking? Authorizing Provider  
levETIRAcetam (KEPPRA) 500 mg tablet take 3 tablets by mouth twice a day 8/29/19   Rosalva ZHENG NP  
galantamine (RAZADYNE) 8 mg SR capsule take 1 capsule by mouth daily before BREAKFAST 8/29/19   Patrick Vizcaino NP  
dilTIAZem CD (CARDIZEM CD) 180 mg ER capsule Take 1 Cap by mouth daily. 6/10/19   Twylla Boeck, MD  
carvedilol (COREG) 3.125 mg tablet Take 1 Tab by mouth two (2) times daily (with meals). 2/26/19   Mary Lou Olson NP  
cyanocobalamin (VITAMIN B-12) 2,500 mcg sublingual tablet Take 5,000 mcg by mouth daily. Provider, Historical  
ASCORBATE CALCIUM (VITAMIN C PO) Take  by mouth daily.     Provider, Historical  
 Calcium-Cholecalciferol, D3, 600 mg(1,500mg) -400 unit chew Take  by mouth daily. Provider, Historical  
aspirin 81 mg chewable tablet Take 81 mg by mouth nightly. Provider, Historical  
rosuvastatin (CRESTOR) 5 mg tablet Take 5 mg by mouth daily. Provider, Historical  
  
 
No Known Allergies Review of Systems: 
Review of systems not obtained due to patient factors. Objective:  
 
Vitals:  
 10/25/19 1230 10/25/19 1300 10/25/19 1400 10/25/19 1500 BP: 145/58 140/62 138/69 153/55 Pulse: 78 87 87 93 Resp: 13 13 14 16 Temp:      
SpO2: 91% 95% 95% 96% Weight:      
Height:      
  
 
Physical Exam: 
General:  Well nourished. In noacute distress. Eyes:  Conjunctivae/corneas clear. PERRL, EOMs intact. Mouth/Throat: Lips  normal.   
Neck: Supple, symmetrical, trachea midline, no adenopathy,, no carotid bruit and no JVD. Lungs:   Clear to auscultation bilaterally. Heart:  Regular rate and rhythm, S1, S2 normal, no murmur, click, rub or gallop. Abdomen:   Soft, appears to be non-tender; patient does not grimace with palpation of abdomen. Bowel sounds normal. Nondistended. Extremities: Extremities normal, atraumatic, no cyanosis or edema. Skin: Skin color, texture, turgor normal. No rashes or lesions Lymph nodes: Cervical, supraclavicular  nodes normal.  
Neurologic:  Patient is somnolent -- he did open his eyes, but did not turn towards me. He also did not  squeeze his fist when my hand was placed in it. Right leg movement not observed during my exam.  
 
Recent Results (from the past 24 hour(s)) CBC WITH AUTOMATED DIFF Collection Time: 10/25/19  1:01 AM  
Result Value Ref Range WBC 7.3 4.1 - 11.1 K/uL  
 RBC 3.45 (L) 4.10 - 5.70 M/uL  
 HGB 11.6 (L) 12.1 - 17.0 g/dL HCT 34.9 (L) 36.6 - 50.3 % .2 (H) 80.0 - 99.0 FL  
 MCH 33.6 26.0 - 34.0 PG  
 MCHC 33.2 30.0 - 36.5 g/dL  
 RDW 12.2 11.5 - 14.5 % PLATELET 772 887 - 266 K/uL  MPV 10.4 8.9 - 12.9 FL  
 NRBC 0.0 0  WBC ABSOLUTE NRBC 0.00 0.00 - 0.01 K/uL NEUTROPHILS 62 32 - 75 % LYMPHOCYTES 22 12 - 49 % MONOCYTES 12 5 - 13 % EOSINOPHILS 3 0 - 7 % BASOPHILS 1 0 - 1 % IMMATURE GRANULOCYTES 0 0.0 - 0.5 % ABS. NEUTROPHILS 4.5 1.8 - 8.0 K/UL  
 ABS. LYMPHOCYTES 1.6 0.8 - 3.5 K/UL  
 ABS. MONOCYTES 0.9 0.0 - 1.0 K/UL  
 ABS. EOSINOPHILS 0.2 0.0 - 0.4 K/UL  
 ABS. BASOPHILS 0.1 0.0 - 0.1 K/UL  
 ABS. IMM. GRANS. 0.0 0.00 - 0.04 K/UL  
 DF AUTOMATED METABOLIC PANEL, COMPREHENSIVE Collection Time: 10/25/19  1:01 AM  
Result Value Ref Range Sodium 135 (L) 136 - 145 mmol/L Potassium 3.7 3.5 - 5.1 mmol/L Chloride 101 97 - 108 mmol/L  
 CO2 27 21 - 32 mmol/L Anion gap 7 5 - 15 mmol/L Glucose 105 (H) 65 - 100 mg/dL BUN 25 (H) 6 - 20 MG/DL Creatinine 1.16 0.70 - 1.30 MG/DL  
 BUN/Creatinine ratio 22 (H) 12 - 20 GFR est AA >60 >60 ml/min/1.73m2 GFR est non-AA 59 (L) >60 ml/min/1.73m2 Calcium 8.7 8.5 - 10.1 MG/DL Bilirubin, total 0.6 0.2 - 1.0 MG/DL  
 ALT (SGPT) 16 12 - 78 U/L  
 AST (SGOT) 12 (L) 15 - 37 U/L Alk. phosphatase 97 45 - 117 U/L Protein, total 6.8 6.4 - 8.2 g/dL Albumin 3.4 (L) 3.5 - 5.0 g/dL Globulin 3.4 2.0 - 4.0 g/dL A-G Ratio 1.0 (L) 1.1 - 2.2 PROTHROMBIN TIME + INR Collection Time: 10/25/19  1:01 AM  
Result Value Ref Range INR 1.0 0.9 - 1.1 Prothrombin time 10.4 9.0 - 11.1 sec PTT Collection Time: 10/25/19  1:01 AM  
Result Value Ref Range aPTT 25.4 22.1 - 32.0 sec  
 aPTT, therapeutic range     58.0 - 77.0 SECS  
GLUCOSE, POC Collection Time: 10/25/19  1:39 AM  
Result Value Ref Range Glucose (POC) 117 (H) 65 - 100 mg/dL Performed by Tim Hines RN   
 
CT HEAD WO CONT Narrative: INDICATION: post thrombectomy EXAM: CT HEAD without contrast.  
CT dose reduction was achieved through use of a standardized protocol tailored for this examination and automatic exposure control for dose modulation. COMPARISON: 10/25/2019, 0238 hours. FINDINGS: Unenhanced CT Head is performed. There is new hyperdensity in the left sylvian fissure region--uncertain if this 
represents petechial or subarachnoid blood and/or luxury perfusion in the 
ischemic bed from previous contrast injection. There is no shift, hydrocephalus or hyperdense extra-axial fluid collection. Impression: IMPRESSION: New hypodensity in the left sylvian fissure region as discussed. No 
shift or hydrocephalus. XR ABD PORT  1 V Narrative: EXAM: XR ABD PORT  1 V 
 
INDICATION: Enteric tube placement. Hospitalization for CVA. COMPARISON: Abdomen view earlier today at 10:09 AM. TECHNIQUE: Portable supine AP abdomen view FINDINGS: Feeding tube is coiled in projection with the right lung base. Cardiac 
monitoring wires are visible. Cardiac size is borderline. No bowel obstruction. Bones are osteopenic. Surgical clips indicate cholecystectomy. Impression: IMPRESSION:  
1. Partially imaged feeding tube appears coiled in a right lower lobe bronchus. Recommend removal and replacement. 2. No bowel obstruction. I discussed this impression with LATONYA Negron at 1220 hours on 10/25/2019. XR ABD PORT  1 V Narrative: INDICATION: NG tube placement FINDINGS: Single supine view of the abdomen demonstrates a nonspecific 
intestinal gas pattern. Dobbhoff tube tip overlies the left lung base. No soft 
tissue mass or pathological soft tissue calcification is seen. The osseous 
structures are unremarkable. Impression: IMPRESSION: Nonspecific intestinal gas pattern. Dobbhoff tube tip overlies the 
left lung base and presumably lies within a hiatal hernia; if duodenal/jejunal 
positioning is desired, this should be advanced at least 20 cm. IR PERC ART [facility] THROMB INFUSION INTRACRANIAL Left middle cerebral artery M2/M3 thrombectomy Cerebral angiogram: Left common carotid, left internal carotid, left middle 
cerebral artery injections Ultrasound guided arterial access: Right common femoral artery Angio-Seal closure device placement Aminta CT of the head DATE: October 25, 2019 HISTORY: 80-year-old right-handed male transferred from [facility] with acute left middle cerebral artery syndrome. CTA and CT 
perfusion demonstrates an occluded left MCA M2 segment. CT perfusion shows a 
profile that is favorable for recanalization. Repeat dry CT on arrival 
demonstrated an aspects score of 9. The patient was living independently prior 
to this event. Physician: Dr. Ronald Osman Asst.: None Anesthesia: Monitored anesthesia care Fluoroscopy time: 43.9 minutes (1172 mGy total biplane) Contrast: 120 mL Isovue 300 CSC metrics: 
Groin puncture time: 03:06 AM 
First-pass time: 03:26 AM 
Final recanalization time: 04:01 AM AM 
Pretreatment TICI score: 0 Posttreatment TICI score: 2C 
 
PROCEDURE: The patient was taken emergently to the neuroangiography suite and 
placed supine on the angiography table. A timeout was performed to confirm the 
patient's identity and planned procedure. 600 mg of rectal aspirin were 
administered. After monitored anesthesia care was initiated, the right groin was 
prepped and draped in standard sterile fashion chlorhexidine. The right femoral 
head was localized fluoroscopically. 1% lidocaine was infiltrated for local 
anesthesia. A small nick incision was made and a 21-gauge micropuncture needle 
was utilized to access the right common femoral artery with ultrasound guidance. An image of the ultrasound-guided access was not saved in the medical record. A 
9 Marshallese sheath was placed over wire and connected to a pressurized infusion of 
heparinized saline. 3000 units of intravenous heparin were administered.  
 
A 90 cm neuron Max guide catheter was advanced into the aortic arch and utilized 
to select the left common carotid artery coaxially over 130 cm 6 Korean penumbra 
select catheter and Glidewire. The guide was placed to continuous forward 
flushing with a series a rotating hemostatic valves. Left common carotid artery digital subtraction angiography was performed in the 
head. These images demonstrated no interval change in left middle cerebral 
artery superior division occlusion compared to the CTA performed at [facility]. There was slight penetration of contrast past the clot into a single 
visualized inferiorly directed M3 branch. The clot was positioned at the M2 
bifurcation M2 small caliber M3 branches. The M2 occlusion was distal to a 180 
degree turn in the vessel into the sylvian fissure. This created unfavorable 
geometry for clot retrieval. In the parenchymal phase, there was a large area of 
ischemic penumbra with CHARAN pial collateral flow penetrating the area of 
hypoperfusion. This was severely delayed into the late venous phase even with 
systolic blood pressures in the 180s. Utilizing digital roadmap guidance, a 4 Max/velocity system was advanced 
coaxially into the occluded left MCA superior division M2 branch over a fathom 
microguidewire. The fathom microguidewire and utilized to cross the occlusion 
into the more inferiorly directed M3 branch and the velocity microcatheter was 
passed beyond the occlusion. Gentle manual injections of contrast under 
fluoroscopy demonstrated the microcatheter positioned in a relatively small 
caliber M3. A 3 mm x 20 mm Trevo retrievable stent was deployed from the M3 into 
the M2. The 4 Max was then advanced around the curve on to the occlusion 
utilizing gentle traction on the stent. The microcatheter was removed from the 
wire and the system was placed to canister suction for approximately 2 minutes. The entire system was then retracted.  Within the M1, during this maneuver, the 
 stent which was under traction spontaneously retracted back into the 4 Max. This 
was removed and examined. No clot was seen on the stent. The 4 Max was aspirated 
to remove any residual clot. Left internal carotid artery digital subtraction angiography was performed after 
thrombectomy. This demonstrated no interval change in the M2 occlusion. The same maneuver was repeated for a second pass with the stent this time 
positioned in a more superiorly directed M2 branch. Gentle injections of 
contrast after this maneuver demonstrated no interval change. Next, the 4 Max was advanced over a velocity onto the face of the clot and 
placed to canister suction without a stent deployed. This was retracted until 
flow was seen in the system. The catheter was aspirated and left middle cerebral 
artery digital subtraction angiography was performed. This demonstrated better 
penetration of contrast beyond the small thromboembolus occluding the superior 
division M2 at the bifurcation. At this point, we decided to exchange the system for a larger caliber system. A 068 ACE suction catheter was then readvanced into the distal left M1 over the 
velocity microcatheter and fathom wire. The velocity and fathom were again used 
to cross the occlusion into the more inferiorly directed M3 branch. The 3 mm x 20 mm Trevo stent was then deployed across the occlusion and utilized 
to pull the 068 catheter into the superior division M2 onto the face of the 
clot. The system was placed to canister suction. After approximately 3 minutes, 
traction was placed on the stent retriever and the entire system was retracted 
into the carotid siphon. The Trevo again spontaneously retracted into the 
aspiration catheter during this maneuver. This was removed and examined. No clot 
was seen on the stent. The 068 was aspirated until it was free-flowing. Left internal carotid artery digital subtraction angiography after this maneuver demonstrated recanalization of the M2 and both of its branch M3 segments 
including a M4 segment that was just distal to the occlusion. No vascular injury 
or extravasation was seen. In the late parenchymal phase, there was a small area 
of decreased staining in the left posterior parietal superior occipital region 
with pial collateral penetration in the late venous phase. No definite culprit 
distal embolus or occlusion was identified for this parenchymal staining defect. The catheter was retracted and the left common carotid artery and DSA was 
performed in the neck. This demonstrated ulcerated atherosclerotic plaque in the 
left internal carotid artery origin but there was no significant stenosis. The 
left internal carotid artery appeared irregular along its entire course from 
atherosclerosis but there was no stenosis, flow-limiting vasospasm or 
dissection. The system was retracted into the right external iliac artery and a manual 
injection of contrast was performed under fluoroscopy to evaluate the common 
femoral puncture site prior to placement of an 8 Italian Angio-Seal device. Hemostasis was achieved. Aminta CT of the head was performed at the end of the case. This demonstrated no 
staining infarct, hemorrhage or mass effect. There were no immediate complications. The patient was taken to the critical 
care unit in stable condition. IMPRESSIONS: 
 
Successful awake left superior division middle cerebral artery M2/M3 
thrombectomy utilizing a combination of the 068 ACE Penumbra aspiration system 
and Trevo 3 x 20 mm retriever. The procedure was challenging because of the very 
distal location of the thromboembolus and vascular anatomy. No intracranial implants. No immediate complications. XR ABD (KUB) Narrative: INDICATION: NG tube placement FINDINGS: Single supine view of the abdomen demonstrates a nonspecific 
intestinal gas pattern. Nasogastric tube tip overlies the left heart. There are 
surgical clips in the right upper quadrant. No soft tissue mass or pathological 
soft tissue calcification is seen. The osseous structures are unremarkable. Impression: IMPRESSION: Nasogastric tube tip terminates over the left heart and presumably 
lies within a hiatal hernia. CT PERF W CBF Left MCA territory perfusion abnormality compatible with infarct. Preliminary report was provided by Dr. Melissa Page, the on-call radiologist, at 
01:03 Final report to follow. EXAM:  CT PERF W CBF INDICATION:  stroke TECHNIQUE: 
Thin rapid bolus infusion 40 mL Isovue-370 CT perfusion imaging was acquired 
with color coded mapping reconstruction of blood volume, mean transit time and 
blood flow. CT dose reduction was achieved through use of a standardized 
protocol tailored for this examination and automatic exposure control for dose 
modulation. COMPARISON: CT, CTA head same day. FINDINGS: 
There is diminished flow and perfusion in the left temporal lobe extending 
superiorly toward the left superior temporal/parietal region. Some involvement 
of the left frontal operculum is suggested, however imaging more cephalad is not 
included. Impression: IMPRESSION:  Perfusion flow abnormality involving left middle cerebral artery territory. CTA CODE NEURO HEAD AND NECK W CONT Left M2 occlusion within sylvian fissure Preliminary report was provided by Dr. Melissa Page, the on-call radiologist, at 
01:04 Final report to follow. INDICATION: Altered mental status. Contrast-enhanced CT angiogram head and neck performed using 100 cc Isovue-370. Three-dimensional postprocessing was performed. CT perfusion analysis using computed tomography with contrast administration 
including postprocessing of parametric maps with determination of cerebral blood 
flow, cerebral blood volume, and mean transit time was also performed.  
 
CT dose reduction was achieved through use of a standardized protocol tailored 
for this examination and are automatic exposure control for dose modulation dose 
reduction NECK: 
 
The origins of the great vessels are patent. Both vertebral arteries are patent. There is moderate calcification at the carotid bifurcations bilaterally, left 
greater than right. There is no significant stenosis on the right. On the left 
there is only 30-40 tablets stenosis using NASCET criteria. Head: 
 
There is focal occlusion of the origin of the posterior left M2 division. There is no large vessel occlusion. No acute cranial aneurysm or vascular malformation. Underlying brain demonstrates hypodensity in left posterior MCA division 
suggesting bland infarct Perfusion imaging demonstrates diminished perfusion in left MCA territory. .. Impression: IMPRESSION: Left posterior M2 occlusion with probable posterior division 
infarct. CT CODE NEURO HEAD WO CONTRAST Narrative: EXAM: CT CODE NEURO HEAD WO CONTRAST INDICATION: weakness COMPARISON: CT brain and CTA exam of earlier today. CONTRAST: None. TECHNIQUE: Unenhanced CT of the head was performed using 5 mm images. Brain and 
bone windows were generated. CT dose reduction was achieved through use of a 
standardized protocol tailored for this examination and automatic exposure 
control for dose modulation. FINDINGS: 
The ventricles and sulci are normal in size, shape and configuration and 
midline. There is no significant change in mild diffuse atrophy and 
periventricular white matter hypodensity. Contrast from prior CTA injection 
remains within the intravascular space with focal relative hyperdensity noted in 
the left M2 within the sylvian fissure. There is no intracranial hemorrhage, 
extra-axial collection, mass, mass effect or midline shift. The basilar 
cisterns are open. There is no evident loss of gray-white distinction. . The bone 
windows demonstrate no abnormalities.  The visualized portions of the paranasal 
sinuses and mastoid air cells are clear. Impression: IMPRESSION: Focal hypodensity in left M2 segment within the sylvian fissure site 
of previously identified occlusion. No identified loss of gray-white distinction 
with no significant change from prior otherwise. Assessment:  
 
Hospital Problems  Date Reviewed: 10/8/2019 Codes Class Noted POA Cerebrovascular accident (CVA) ([facility] [address] ICD-10-CM: I63.9 ICD-9-CM: 434.91  10/25/2019 Unknown Plan: 1. Acute cerebrovascular accident. S/P thrombectomy. 2. Paroxysmal atrial fibrillation. Patient is currently not on any anticoagulation. Resume card of them, beta blocker. 3. Coronary artery disease. Per patients daughter, patient has had one stent remotely. 4. Essential hypertension. No interventionist recommends SBP > 110 and SBP < 140. Patient is on a low-dose nicardipine drip. Additionally, home medications of Couric and deltas am I being resumed. Anticipate that he will be able to be weaned off nicardipine drip in the near future. Signed By: Fred Johnston DO October 25, 2019

## 2019-10-25 NOTE — ED NOTES
Pt was seen here earlier today for a seizure. Per EMS about 10PM tonight pt started having difficulty speaking and right arm weakness. Per EMS .  Pt is incontinent of urine on arrival.

## 2019-10-25 NOTE — CDMP QUERY
Patient admitted with CVA , noted to have a DHT. If possible, please document in progress notes and d/c summary if you are evaluating and/or treating any of the following: 
 
=> Acute Severe Protein-Calorie Malnutrition 
=> Protein Calorie Malnutrition, unspecified 
=> Other Explanation of clinical findings 
=> Clinically Undetermined (no explanation for clinical findings) The medical record reflects the following: 
   Risk Factors: 80 yr old male admitted with CVA and has Alzheimer's. Clinical Indicators: BMI 17.13, Temporal and orbital wasting. per RD. RD notes; \"Patient meets criteria for Severe Chronic Protein Calorie Malnutrition as evidenced by:  
ASPEN Malnutrition Criteria Acute Illness, Chronic Illness, or Social/Enviornmental: Chronic illness Body Fat: Severe Muscle Mass: Severe ASPEN Malnutrition Score - Chronic Illness: 12 
Chronic Illness - Malnutrition Diagnosis: Severe malnutrition. \" Treatment: Nutritional assessment, DHT feedings, Weight monitoring, Intake&Output Please clarify and document your clinical opinion in the progress notes and discharge summary including the definitive and/or presumptive diagnosis, (suspected or probable), related to the above clinical findings. Please include clinical findings supporting your diagnosis. Thank You, Latisha Fair RN 
[phone]

## 2019-10-25 NOTE — PROCEDURES
NEUROINTERVENTIONAL SURGERY POST-PROCEDURE NOTE PROCEDURE: 
Left MCA distal M2 thrombectomy US guided access Angioseal 
 
VESSEL(S) STUDIED: 1. LCCA 2. LICA 3. L MCA VESSEL(S) TREATED: 
1. Left MCA M2 segment CSC METRICS: 
   1. GROIN PUNCTURE TIME: [address] 2. FIRST PASS TIME: 9385 3. FINAL RECANALIZATION TIME: 0401 4. PRE-TREATMENT TICI SCORE: 0 
   5. POST-TREATMENT TICI SCORE: 2C 
 
PRELIMINARY REPORT & DISPOSITION:  
 
Awake distal left MCA M2/M3 bifurcation occlusion successfully recanalized with Penumbra ACE 69 and 3 x 20 mm Trevo. Challenging distal location of embolus and anatomy prolonged procedure time. Aminta CT no hemorrhage or mass effect. COMPLICATIONS: 
None immediate FOLLOW-UP: 
ICU -140 DATE OF SERVICE: 
10/25/2019 4:19 AM  
 
ATTENDING SURGEON(S): 
Lazarus Bernabe MD 
 
 
ANESTHESIA:  
MAC MEDICATIONS:  
See nursing record 3000U heparin IV 
1% lidocaine local 
 
PUNCTURE SITE: 
Right common femoral artery. Arteriotomy closed with 8F Angioseal.  Flat x 4 hours.

## 2019-10-25 NOTE — ED NOTES
Pt arrived in Emergency Dept. Pt was transfer from [address] [place], [place] [address]as [facility] with LVO for thrombectomy. Pt taken via BSCCT immediately to CT where Dr. Gay Holden and Neurointerventional JESSE Minor were present. Arrival NIH Score 31 per Mily, NP, concur. At completion of scan, pt taken by BSCCT directly to [address] [place] [address] for procedure.

## 2019-10-25 NOTE — ANESTHESIA POSTPROCEDURE EVALUATION
* No procedures listed *. MAC Anesthesia Post Evaluation Patient location during evaluation: PACU Patient participation: complete - patient participated Level of consciousness: awake Pain management: adequate Airway patency: patent Anesthetic complications: no 
Cardiovascular status: hemodynamically stable Respiratory status: acceptable Hydration status: acceptable Comments: I have seen and evaluated the patient. The patient is ready for PACU discharge. [address], DO Vitals Value Taken Time BP Temp Pulse 70 10/25/2019  5:15 AM  
Resp 11 10/25/2019  5:15 AM  
SpO2 99 % 10/25/2019  5:15 AM  
Vitals shown include unvalidated device data.

## 2019-10-25 NOTE — PROGRESS NOTES
Neurocritical Care Code Stroke Documentation Symptoms:    Aphasia, left gaze preference, right hemiparesis Last Known Well:   Medical hx:   A-fib with RVR, Seizure disorder, Dementia, CAD, HTN Anticoagulation:  ASA 81 mg daily VAN:   Positive NIHSS:   1a-LOC:0 
  1b-Month/Age:2 
  1c-Open/Close Hand:1 2-Best Gaze:2 3-Visual Fields:3 
  4-Facial Palsy:0 
  5a-Left Arm:1 
  5b-Right Arm:4 
  6a-Left Leg:3 
  6b-Right Le 
  7-Limb Ataxia:2 
  8-Sensory:2 
  9-Best Language:3 
  10-Dysarthria:2 
  11-Extinction/Inattention:2 
TOTAL SCORE:31 Imaging:   CTA showing acute left M1/M2 occlusion Plan:   TPA Candidate: No 
   
  Mechanical thrombectomy Candidate: YES taken to IR for emergent mechanical thrombectomy on arrival  
 
Discussed with: Dr. Caleb Javed This procedure has been fully reviewed with the patient's daughter Roney Fowler ([phone]) and telephone informed consent has been obtained. Risks/benefits of procedure discussed in detail with her, she wishes to proceed. All questions were answered. Marvel Welsh NP Neurocritical Care Nurse Practitioner [phone]

## 2019-10-25 NOTE — PROGRESS NOTES
Attempted straight cath without success. Adin Montoya RN at bedside and aware. Will attempt again upon arrival to ICU.

## 2019-10-25 NOTE — PROGRESS NOTES
Speech pathology note Reviewed chart and discussed case with RN. Note patient remains lethargic and altered with NIHSS=23 per neurointerventional surgery note. NGT attempted x3, however unable to correctly place as NGT was in hiatal hernia during some attempts and lungs during others. Given mental status, recommend alternative means of nutrition over the weekend. SLP will follow up for formal swallowing evaluation on Monday if patient is medically appropriate for PO consideration. Thank you. Alireza Murray., CCC-SLP

## 2019-10-25 NOTE — PROGRESS NOTES
Neurointerventional Surgery Progress Note Presley Montero NP Cell: [phone] Admit Date: 10/25/2019 Daily Progress Note: 10/25/2019 LOS: 0 days POD:* No surgery found * S/P: Mechanical Thrombectomy of Left MCA distal M2 on 10/25/19 with Dr. Nancy Mitchell Interval History/Subjective: Thrombectomy overnight, groin closed around 4 AM. Repeat head CT this AM shows no signs of hemorrhage , no significant early ischemic changes. ? Small left parietotemporal region of early ischemia but could just be chronic small vessel disease. Need MRI for further clarification. Patient remains somnolent today. NIHSS:   
  1a-LOC:1 
  1b-Month/Age:2 
  1c-Open/Close Hand:1 2-Best Gaze:1 3-Visual Fields:3 
  4-Facial Palsy:1 
  5a-Left Arm:0 
  5b-Right Arm:3 
  6a-Left Le 
  6b-Right Leg:3 
  7-Limb Ataxia: UN 
  8-Sensory:2 
  9-Best Language:2 
  10-Dysarthria:2 
  11-Extinction/Inattention:2 
TOTAL SCORE:23 
(pre-procedure 31) Assessment & Plan: Active Problems: 
  Cerebrovascular accident (CVA) ([facility] [address] (10/25/2019) Acute CVA in the setting of M2 Occlusive Thrombus:  
- patient remains somnolent today but stroke scale has improved from 31 pre procedure to 23 today  
- keep sbp < 160  
- cardene PRN as needed 
- continue with full stroke work up per neurology  
- ECHO and MR pending  
- groin puncture site is C/D/I with no bleeding or hematoma noted Plan d/w Dr. Nancy Mitchell Admission Summary:  
  
Mr. Lynch Cea is a 80year old male with a past medical history of  
 
Current Facility-Administered Medications Medication Dose Route Frequency Provider Last Rate Last Dose  sodium chloride (NS) flush 5-40 mL  5-40 mL IntraVENous Q8H Maryagnes Reges H, DO      
 sodium chloride (NS) flush 5-40 mL  5-40 mL IntraVENous PRN Maryagnes Reges H, DO      
 acetaminophen (TYLENOL) tablet 650 mg  650 mg Oral Q4H PRN Ada Class, DO      
  ondansetron (ZOFRAN) injection 4 mg  4 mg IntraVENous Q4H PRN Awilda Cam H, DO      
 pantoprazole (PROTONIX) tablet 40 mg  40 mg Oral DAILY Awilda Cam H, DO      
 niCARdipine (CARDENE) 25 mg in 0.9% sodium chloride 250 mL infusion  0-15 mg/hr IntraVENous TITRATE Thomas Minor NP   Stopped at 10/25/19 0820  
 lactated Ringers infusion  50 mL/hr IntraVENous CONTINUOUS Thomas Minor NP 50 mL/hr at 10/25/19 0514 50 mL/hr at 10/25/19 2615  carvedilol (COREG) tablet 3.125 mg  3.125 mg Oral BID WITH MEALS Awilda Cam H, DO      
 levETIRAcetam (KEPPRA) tablet 1,500 mg  1,500 mg Oral BID Chavez Flair, DO No Known Allergies Review of Systems: 
Review of systems not obtained due to patient factors. Objective:  
 
Vital signs Temp (24hrs), Av.6 °F (37 °C), Min:97.9 °F (36.6 °C), Max:98.9 °F (37.2 °C) 
 10/25 0701 - 10/25 1900 In: -  
Out: 345 [Urine:345]  10/23 1901 - 10/25 0700 In: 5138.3 [I.V.:5138.3] Out: 625 [Urine:625] Visit Vitals /60 Pulse 92 Temp 98.8 °F (37.1 °C) Resp 14 Wt 112 lb 10.5 oz (51.1 kg) SpO2 98% BMI 17.13 kg/m² O2 Flow Rate (L/min): 2 l/min O2 Device: Nasal cannula Vitals:  
 10/25/19 0715 10/25/19 0730 10/25/19 0800 10/25/19 0830 BP: 101/56 121/84 (!) 110/92 115/60 Pulse: 97 90 99 92 Resp: 14 16 14 14 Temp:   98.8 °F (37.1 °C) SpO2: 98% 99% 98% 98% Weight:      
  
 
Neurologic Exam: 
 
NEURO: Eyes open to voice. Follows some commands. Lethargic. Mumbles responses. No usable speech. PERRL, 3 mm bilaterally. Right eye dysconjugate gaze. EOMI. Face symmetric. Palate symmetric. Tongue midline. Right arm and leg with no effort against gravity. Imaging: 
 
CT Code Neuro Head 10/25/2019 IMPRESSION: Focal hypodensity in left M2 segment within the sylvian fissure site 
of previously identified occlusion.  No identified loss of gray-white distinction 
with no significant change from prior otherwise. 
  
 
 CT Perf w CBF 10/25/2019: 
 
IMPRESSION: 
Perfusion flow abnormality involving left middle cerebral artery territory. CTA Head / Neck 10/25/2019: 
 
IMPRESSION: Left posterior M2 occlusion with probable posterior division 
infarct. 
  
  
 
 
24 hour results: 
 
Recent Results (from the past 24 hour(s)) GLUCOSE, POC Collection Time: 10/24/19  2:00 PM  
Result Value Ref Range Glucose (POC) 102 (H) 65 - 100 mg/dL Performed by Krzysztof Eldridge \"Mayo\" CBC WITH AUTOMATED DIFF Collection Time: 10/24/19  2:15 PM  
Result Value Ref Range WBC 7.3 4.1 - 11.1 K/uL  
 RBC 3.59 (L) 4.10 - 5.70 M/uL  
 HGB 11.9 (L) 12.1 - 17.0 g/dL HCT 36.7 36.6 - 50.3 % .2 (H) 80.0 - 99.0 FL  
 MCH 33.1 26.0 - 34.0 PG  
 MCHC 32.4 30.0 - 36.5 g/dL  
 RDW 12.3 11.5 - 14.5 % PLATELET 398 416 - 233 K/uL MPV 10.9 8.9 - 12.9 FL  
 NRBC 0.0 0  WBC ABSOLUTE NRBC 0.00 0.00 - 0.01 K/uL NEUTROPHILS 58 32 - 75 % LYMPHOCYTES 23 12 - 49 % MONOCYTES 14 (H) 5 - 13 % EOSINOPHILS 4 0 - 7 % BASOPHILS 1 0 - 1 % IMMATURE GRANULOCYTES 0 0.0 - 0.5 % ABS. NEUTROPHILS 4.3 1.8 - 8.0 K/UL  
 ABS. LYMPHOCYTES 1.7 0.8 - 3.5 K/UL  
 ABS. MONOCYTES 1.0 0.0 - 1.0 K/UL  
 ABS. EOSINOPHILS 0.3 0.0 - 0.4 K/UL  
 ABS. BASOPHILS 0.0 0.0 - 0.1 K/UL  
 ABS. IMM. GRANS. 0.0 0.00 - 0.04 K/UL  
 DF AUTOMATED METABOLIC PANEL, COMPREHENSIVE Collection Time: 10/24/19  2:15 PM  
Result Value Ref Range Sodium 136 136 - 145 mmol/L Potassium 4.1 3.5 - 5.1 mmol/L Chloride 104 97 - 108 mmol/L  
 CO2 27 21 - 32 mmol/L Anion gap 5 5 - 15 mmol/L Glucose 93 65 - 100 mg/dL BUN 27 (H) 6 - 20 MG/DL Creatinine 1.05 0.70 - 1.30 MG/DL  
 BUN/Creatinine ratio 26 (H) 12 - 20 GFR est AA >60 >60 ml/min/1.73m2 GFR est non-AA >60 >60 ml/min/1.73m2 Calcium 8.5 8.5 - 10.1 MG/DL  Bilirubin, total 0.3 0.2 - 1.0 MG/DL  
 ALT (SGPT) 20 12 - 78 U/L  
 AST (SGOT) 16 15 - 37 U/L  
 Alk. phosphatase 103 45 - 117 U/L Protein, total 7.3 6.4 - 8.2 g/dL Albumin 3.5 3.5 - 5.0 g/dL Globulin 3.8 2.0 - 4.0 g/dL A-G Ratio 0.9 (L) 1.1 - 2.2 SAMPLES BEING HELD Collection Time: 10/24/19  2:15 PM  
Result Value Ref Range SAMPLES BEING HELD  1 BLUE   
 COMMENT Add-on orders for these samples will be processed based on acceptable specimen integrity and analyte stability, which may vary by analyte. ETHYL ALCOHOL Collection Time: 10/24/19  2:15 PM  
Result Value Ref Range ALCOHOL(ETHYL),SERUM <10 <10 MG/DL  
EKG, 12 LEAD, INITIAL Collection Time: 10/24/19  2:16 PM  
Result Value Ref Range Ventricular Rate 59 BPM  
 Atrial Rate 59 BPM  
 P-R Interval 260 ms QRS Duration 88 ms Q-T Interval 440 ms QTC Calculation (Bezet) 435 ms Calculated P Axis 19 degrees Calculated R Axis -10 degrees Calculated T Axis 11 degrees Diagnosis Sinus bradycardia with 1st degree AV block Poor R-wave Progression (consider lead placement or loss of anterior forces) Confirmed by Jasper Priest ([id number]) on 10/25/2019 8:45:03 AM 
  
URINALYSIS W/ REFLEX CULTURE Collection Time: 10/24/19  3:20 PM  
Result Value Ref Range Color YELLOW/STRAW Appearance CLEAR CLEAR Specific gravity 1.013 1.003 - 1.030    
 pH (UA) 7.0 5.0 - 8.0 Protein NEGATIVE  NEG mg/dL Glucose NEGATIVE  NEG mg/dL Ketone NEGATIVE  NEG mg/dL Bilirubin NEGATIVE  NEG Blood NEGATIVE  NEG Urobilinogen 0.2 0.2 - 1.0 EU/dL Nitrites NEGATIVE  NEG Leukocyte Esterase NEGATIVE  NEG    
 WBC 0-4 0 - 4 /hpf  
 RBC 0-5 0 - 5 /hpf Epithelial cells FEW FEW /lpf Bacteria NEGATIVE  NEG /hpf  
 UA:UC IF INDICATED CULTURE NOT INDICATED BY UA RESULT CNI Hyaline cast 0-2 0 - 5 /lpf  
CBC WITH AUTOMATED DIFF Collection Time: 10/25/19  1:01 AM  
Result Value Ref Range WBC 7.3 4.1 - 11.1 K/uL  
 RBC 3.45 (L) 4.10 - 5.70 M/uL  
 HGB 11.6 (L) 12.1 - 17.0 g/dL HCT 34.9 (L) 36.6 - 50.3 % .2 (H) 80.0 - 99.0 FL  
 MCH 33.6 26.0 - 34.0 PG  
 MCHC 33.2 30.0 - 36.5 g/dL  
 RDW 12.2 11.5 - 14.5 % PLATELET 183 187 - 767 K/uL MPV 10.4 8.9 - 12.9 FL  
 NRBC 0.0 0  WBC ABSOLUTE NRBC 0.00 0.00 - 0.01 K/uL NEUTROPHILS 62 32 - 75 % LYMPHOCYTES 22 12 - 49 % MONOCYTES 12 5 - 13 % EOSINOPHILS 3 0 - 7 % BASOPHILS 1 0 - 1 % IMMATURE GRANULOCYTES 0 0.0 - 0.5 % ABS. NEUTROPHILS 4.5 1.8 - 8.0 K/UL  
 ABS. LYMPHOCYTES 1.6 0.8 - 3.5 K/UL  
 ABS. MONOCYTES 0.9 0.0 - 1.0 K/UL  
 ABS. EOSINOPHILS 0.2 0.0 - 0.4 K/UL  
 ABS. BASOPHILS 0.1 0.0 - 0.1 K/UL  
 ABS. IMM. GRANS. 0.0 0.00 - 0.04 K/UL  
 DF AUTOMATED METABOLIC PANEL, COMPREHENSIVE Collection Time: 10/25/19  1:01 AM  
Result Value Ref Range Sodium 135 (L) 136 - 145 mmol/L Potassium 3.7 3.5 - 5.1 mmol/L Chloride 101 97 - 108 mmol/L  
 CO2 27 21 - 32 mmol/L Anion gap 7 5 - 15 mmol/L Glucose 105 (H) 65 - 100 mg/dL BUN 25 (H) 6 - 20 MG/DL Creatinine 1.16 0.70 - 1.30 MG/DL  
 BUN/Creatinine ratio 22 (H) 12 - 20 GFR est AA >60 >60 ml/min/1.73m2 GFR est non-AA 59 (L) >60 ml/min/1.73m2 Calcium 8.7 8.5 - 10.1 MG/DL Bilirubin, total 0.6 0.2 - 1.0 MG/DL  
 ALT (SGPT) 16 12 - 78 U/L  
 AST (SGOT) 12 (L) 15 - 37 U/L Alk. phosphatase 97 45 - 117 U/L Protein, total 6.8 6.4 - 8.2 g/dL Albumin 3.4 (L) 3.5 - 5.0 g/dL Globulin 3.4 2.0 - 4.0 g/dL A-G Ratio 1.0 (L) 1.1 - 2.2 PROTHROMBIN TIME + INR Collection Time: 10/25/19  1:01 AM  
Result Value Ref Range INR 1.0 0.9 - 1.1 Prothrombin time 10.4 9.0 - 11.1 sec PTT Collection Time: 10/25/19  1:01 AM  
Result Value Ref Range aPTT 25.4 22.1 - 32.0 sec  
 aPTT, therapeutic range     58.0 - 77.0 SECS  
GLUCOSE, POC Collection Time: 10/25/19  1:39 AM  
Result Value Ref Range Glucose (POC) 117 (H) 65 - 100 mg/dL  Performed by Andrzej Ivey NP

## 2019-10-25 NOTE — CONSULTS
NEUROLOGY CONSULT NOTE Name Amy Melton Age 80 y.o. MRN [id number]  1927 Consulting Physician: Ancelmo Delatorre MD   
 
Chief Complaint:  R-HP Assessment: · Active Problems: ·   Cerebrovascular accident (CVA) ([address] (10/25/2019) ·  
·  
80year old RHM with a h/o epilepsy on LEV, CAD, HTN, HPL, pAfib on ASA PTA presenting with acute R-HP upon awakening, evidence of L M2 occlusion s/p thrombectomy upon transfer from [facility]. DDx: L posterior MCA infarct, mechanism is likely cardioembolic. EEG obtained due to somnolence on examination and shows diffuse slowing, no epileptiform activity/subclinical status. Recommendations: MRI Brain WO 
ASA 300mg TX for now- will need to discuss anticoagulation once further imaging is completed and PO access obtained FLP pending- resume statin therapy when PO access established PT/OT/ST evaluations SBP parameters per NIS Continue LEV 1500mg BID for seizure ppx Neurology to follow Thank you very much for this referral. I appreciate the opportunity to participate in this patient's care. History of Present Illness: This is a 80 y.o.  right handed  male, we were asked to see for R-HP, stroke, h/o epilepsy. PMH notable for epilepsy/absence seizures per family on LEV, CAD, HTN, HPL, pAfib not on [address]. He presented initially to [facility] with concern for seizures as well as weakness of the RUE/RLE. This improved during ED evaluation and he was discharged. He was fatigued after arriving home and went to sleep. He later awoke with R-HP, aphasia with evidence of L M2 occlusion s/p endovascular intervention/thrombectomy. CTH with L posterior MCA infarct with question of petechial hemorrhage vs luxury perfusion from contrast during the procedure. No Known Allergies Prior to Admission medications Medication Sig Start Date End Date Taking? Authorizing Provider levETIRAcetam (KEPPRA) 500 mg tablet take 3 tablets by mouth twice a day 8/29/19   Mercedes ZHENG NP  
galantamine (RAZADYNE) 8 mg SR capsule take 1 capsule by mouth daily before BREAKFAST 8/29/19   Agata Lima NP  
dilTIAZem CD (CARDIZEM CD) 180 mg ER capsule Take 1 Cap by mouth daily. 6/10/19   Ramona Roblero MD  
carvedilol (COREG) 3.125 mg tablet Take 1 Tab by mouth two (2) times daily (with meals). 2/26/19   Lesli Olson NP  
cyanocobalamin (VITAMIN B-12) 2,500 mcg sublingual tablet Take 5,000 mcg by mouth daily. Provider, Historical  
ASCORBATE CALCIUM (VITAMIN C PO) Take  by mouth daily. Provider, Historical  
Calcium-Cholecalciferol, D3, 600 mg(1,500mg) -400 unit chew Take  by mouth daily. Provider, Historical  
aspirin 81 mg chewable tablet Take 81 mg by mouth nightly. Provider, Historical  
rosuvastatin (CRESTOR) 5 mg tablet Take 5 mg by mouth daily. Provider, Historical  
 
 
Past Medical History:  
Diagnosis Date  CAD (coronary artery disease)  Hypercholesteremia  Hypertension  Seizures ([address]   
 last seizure in 2011 per pt Past Surgical History:  
Procedure Laterality Date  CARDIAC SURG PROCEDURE UNLIST    
 cardiac stents [address]  HX CHOLECYSTECTOMY  2007  IR PERC ART MECH THROMB INFUSION INTRACRANIAL  10/25/2019  IL EGD BALLOON DILATION ESOPHAGUS <30 MM DIAM  11/21/2012 Social History Tobacco Use  Smoking status: Former Smoker Types: Cigarettes  Smokeless tobacco: Never Used  Tobacco comment: years ago- not sure when Substance Use Topics  Alcohol use: No  
  
 
Family History Problem Relation Age of Onset  Alcohol abuse Mother Review of Systems:  
Comprehensive review of systems: unable to assess 2/2 aphasia. Exam:  
 
Visit Vitals /55 Pulse 93 Temp 99 °F (37.2 °C) Resp 16 Ht 5' 8\" (1.727 m) Wt 51.1 kg (112 lb 10.5 oz) SpO2 96% BMI 17.13 kg/m² General: Somnolent Head: Normocephalic, atraumatic, anicteric sclera Lungs:  Clear to auscultation bilaterally, No wheezes or rubs Cardiac: Irregular rate and rhythm with no murmurs. Abd: Bowel sounds were audible. No tenderness on palpation Ext: No pedal edema Skin: No overt signs of rash Neurological Exam: 
Mental Status: Somnolent, able to follow some commands Speech: +Expressive aphasia, severe dysarthria Cranial Nerves:   Intact visual fields to threat b/l. PERR, EOMI Motor:  RUE 4-/5, RLE 1/5, LUE 5-/5, LLE 5-/5. Reflexes:   Deep tendon reflexes 1+/4 and symmetrical.  Plantar response is extensor R, downgoing L. Sensory:   Decreased to noxious stimulation RUE/RLE Gait:  Gait is deferred 2/2 weakness Tremor:   No tremor noted. Cerebellar:  Unable to assess Neurovascular: No carotid bruits Imaging CT Results (maximum last 3): Results from Hospital Encounter encounter on 10/25/19 CT HEAD WO CONT Narrative INDICATION: post thrombectomy EXAM: CT HEAD without contrast.  
CT dose reduction was achieved through use of a standardized protocol tailored 
for this examination and automatic exposure control for dose modulation. COMPARISON: 10/25/2019, 0238 hours. FINDINGS: Unenhanced CT Head is performed. There is new hyperdensity in the left sylvian fissure region--uncertain if this 
represents petechial or subarachnoid blood and/or luxury perfusion in the 
ischemic bed from previous contrast injection. There is no shift, hydrocephalus or hyperdense extra-axial fluid collection. Impression IMPRESSION: New hypodensity in the left sylvian fissure region as discussed. No 
shift or hydrocephalus. CT CODE NEURO HEAD WO CONTRAST Narrative EXAM: CT CODE NEURO HEAD WO CONTRAST INDICATION: weakness COMPARISON: CT brain and CTA exam of earlier today. CONTRAST: None. TECHNIQUE: Unenhanced CT of the head was performed using 5 mm images. Brain and 
bone windows were generated. CT dose reduction was achieved through use of a 
standardized protocol tailored for this examination and automatic exposure 
control for dose modulation. FINDINGS: 
The ventricles and sulci are normal in size, shape and configuration and 
midline. There is no significant change in mild diffuse atrophy and 
periventricular white matter hypodensity. Contrast from prior CTA injection 
remains within the intravascular space with focal relative hyperdensity noted in 
the left M2 within the sylvian fissure. There is no intracranial hemorrhage, 
extra-axial collection, mass, mass effect or midline shift. The basilar 
cisterns are open. There is no evident loss of gray-white distinction. . The bone 
windows demonstrate no abnormalities. The visualized portions of the paranasal 
sinuses and mastoid air cells are clear. Impression IMPRESSION: Focal hypodensity in left M2 segment within the sylvian fissure site 
of previously identified occlusion. No identified loss of gray-white distinction 
with no significant change from prior otherwise. CT PERF W CBF Narrative *PRELIMINARY REPORT* Left MCA territory perfusion abnormality compatible with infarct. Preliminary report was provided by Dr. José Antonio Cortez, the on-call radiologist, at 
01:03 Final report to follow. *END PRELIMINARY REPORT* *FINAL REPORT BELOW* EXAM:  CT PERF W CBF INDICATION:  stroke TECHNIQUE: 
Thin rapid bolus infusion 40 mL [id number] CT perfusion imaging was acquired 
with color coded mapping reconstruction of blood volume, mean transit time and 
blood flow. CT dose reduction was achieved through use of a standardized 
protocol tailored for this examination and automatic exposure control for dose 
modulation. COMPARISON: CT, CTA head same day. FINDINGS: 
There is diminished flow and perfusion in the left temporal lobe extending 
superiorly toward the left superior temporal/parietal region.  Some involvement 
of the left frontal operculum is suggested, however imaging more cephalad is not 
included. Impression IMPRESSION: 
Perfusion flow abnormality involving left middle cerebral artery territory. Lab Review Lab Results Component Value Date/Time WBC 7.3 10/25/2019 01:01 AM  
 HCT 34.9 (L) 10/25/2019 01:01 AM  
 HGB 11.6 (L) 10/25/2019 01:01 AM  
 PLATELET 417 60/76/4647 01:01 AM  
 
Lab Results Component Value Date/Time Sodium 135 (L) 10/25/2019 01:01 AM  
 Potassium 3.7 10/25/2019 01:01 AM  
 Chloride 101 10/25/2019 01:01 AM  
 CO2 27 10/25/2019 01:01 AM  
 Glucose 105 (H) 10/25/2019 01:01 AM  
 BUN 25 (H) 10/25/2019 01:01 AM  
 Creatinine 1.16 10/25/2019 01:01 AM  
 Calcium 8.7 10/25/2019 01:01 AM  
 
No components found for: Silvestre Moya No results found for: LIANA Signed: 
Dillon Mackey.  Doni Rivero DO 
10/25/2019 
3:39 PM

## 2019-10-25 NOTE — PROGRESS NOTES
NUTRITION COMPLETE ASSESSMENT 
 
RECOMMENDATIONS:  
1. Start nutrition support until able to advance diet: 
 (a) If able to get enteral access recommend tube feeds of: Osmolite 1.5 @ 45ml/hr + 1pkt prosource daily + 125ml flush q4hr. (b) If unable to get enteral access may benefit from temporary PPN until DHT placed (no central line): 4.25%AA, D10 @ 75ml/hr + 500ml, 20% lipids 3x/week. 2. Reduce LR if PPN started. 3. Zero bedscale and reweigh if able. Weight discrepancy with reported UBW. Interventions/Plan:  
Food/Nutrient Delivery:          Initiate enteral nutrition Nutrition Education:    discussed nutrition support plans with family Assessment:  
Reason for Assessment: New Nutrition Support Diet: NPO Supplements: None Nutritionally Significant Medications: [x] Reviewed & Includes: coreg, keppra, protonix, LR @ 50ml Meal Intake: No data found. Pre-Hospitalization: 
Usual Appetite: Good Diet at Home: regular Vitamins/Supplements: Yes(Ensure BID) Subjective: Spoke with family - see below. Objective: 
Pt admitted for CVA. PMHx: CAD, HTN, HLD, Alzheimers dx, afib, epilepsy. Transferred from [facility] for thrombectomy today. Attempted placement of DHT this afternoon but not correctly placed. Spoke with RN who hopes to have [place] [address] placed in IR. Pt and family visited. Wt chronically low for past 2+ years. Temporal and orbital wasting observed. Wife reports wt has been stable at 122-123# at home (she had been weighing him monthly per PCP recommendations). Edentulous but does well with \"gumming\" foods at home and has been drinking Ensure BID. If able to get enteral access recommend tube feeds of: Osmolite 1.5 @ 45ml/hr + 1pkt prosource daily + 125ml flush q4hr. Provides:1080ml, 1620kcal, 82g protein, 820ml free fluid + 750ml flush + 90ml w/ prosource = 1660ml fluid. Meets 100% energy and protein needs.   
If unable to get enteral access may benefit from temporary PPN until [place] [address] placed: 4.25%AA, D10 @ 75ml/hr + 500ml, 20% lipids 3x/week. Provides: 1346kcal, 77g protein, 1800ml fluid. Meets 88% energy and 100% protein needs. Patient meets criteria for Severe Chronic Protein Calorie Malnutrition as evidenced by:  
ASPEN Malnutrition Criteria Acute Illness, Chronic Illness, or Social/Enviornmental: Chronic illness Body Fat: Severe Muscle Mass: Severe ASPEN Malnutrition Score - Chronic Illness: 12 
Chronic Illness - Malnutrition Diagnosis: Severe malnutrition. Estimated Nutrition Needs:  
Kcals/day: 5636 Kcals/day(1527-1645kcal) Protein: 73 g(73-83g (1.3-1.5g/kg)) Fluid: 1650 ml(30ml/kg) Based On: [place] Jeor(x 1.2-1.3) Weight Used: UBW(55.9kg) Pt expected to meet estimated nutrient needs:  []   Yes     []  No [x] Unable to predict at this time Nutrition Diagnosis:  
1. Inadequate oral intake related to AMS as evidenced by NPO s/p thrombectomy 2. Malnutrition related to inadequate oral intake as evidenced by chronic low wt with severe muscle/fat wasting Goals:   
 Nutrition support meeting at least 90% needs in 3-4 days; wt maintenance/gain Monitoring & Evaluation: - Total energy intake, Enteral/parenteral nutrition intake - Weight/weight change Previous Nutrition Goals Met:   N/A Previous Recommendations:    N/A Education & Discharge Needs: 
 [] None Identified 
 [x] Identified and addressed - with family   
 [x] Participated in care plan, discharge planning, and/or interdisciplinary rounds Cultural, Buddhist and ethnic food preferences identified: None Skin Integrity: [x]Intact  []Other Edema: [x]None []Other Last BM: PTA Food Allergies: [x]None []Other Diet Restrictions: Cultural/Church Preference(s): None Anthropometrics:   
Weight Loss Metrics 10/25/2019 10/24/2019 10/8/2019 8/29/2019 4/2/2019 12/31/2018 10/16/2018 Today's Wt 112 lb 10.5 oz 123 lb 7.3 oz 124 lb - 123 lb 6.4 oz 121 lb 123 lb 6.4 oz  
 BMI 17.13 kg/m2 18.77 kg/m2 18.85 kg/m2 18.76 kg/m2 18.76 kg/m2 18.4 kg/m2 18.76 kg/m2 Weight Source: Bed Height: 5' 8\" (172.7 cm), Body mass index is 17.13 kg/m². IBW : 69.9 kg (154 lb), % IBW (Calculated): 73.15 % Usual Body Weight: 55.8 kg (123 lb),   
 
Labs:   
Lab Results Component Value Date/Time Sodium 135 (L) 10/25/2019 01:01 AM  
 Potassium 3.7 10/25/2019 01:01 AM  
 Chloride 101 10/25/2019 01:01 AM  
 CO2 27 10/25/2019 01:01 AM  
 Glucose 105 (H) 10/25/2019 01:01 AM  
 BUN 25 (H) 10/25/2019 01:01 AM  
 Creatinine 1.16 10/25/2019 01:01 AM  
 Calcium 8.7 10/25/2019 01:01 AM  
 Magnesium 2.0 07/29/2014 06:14 AM  
 Albumin 3.4 (L) 10/25/2019 01:01 AM  
 
Lab Results Component Value Date/Time Hemoglobin A1c 5.4 07/29/2014 06:12 AM  
 
 
Dagmar Velasquez RD [facility], Pager #[phone] or [phone]

## 2019-10-26 NOTE — CONSULTS
INPATIENT NEUROLOGY CONSULTATION 
10/26/2019 Consulted by: Rain Ponce MD   
 
 
Patient ID: 
Natasha Mcdowell 
[id number] 
89 y.o. 
2/27/1927 CC: Stroke HPI Radha Bautista is a 80-year-old gentleman who was admitted yesterday when he continued to have spells of decreased responsiveness and speech difficulty. I reviewed the medical record. He is a new patient for me. In summary he has a history of seizure disorder and dementia. He was brought to the hospital emergently showing an occlusion of the left MCA and he underwent emergent thrombectomy of the M2 with improvement clinically. He is now in the ICU recovering. He is still unable to produce any language. He is on aspirin RI.  EEG yesterday did not show any seizures. Review of Systems Unable to perform ROS: Patient nonverbal  
 
 
Past Medical History:  
Diagnosis Date  CAD (coronary artery disease)  Hypercholesteremia  Hypertension  Seizures ([facility] [address]   
 last seizure in 2011 per pt Family History Problem Relation Age of Onset  Alcohol abuse Mother Social History Socioeconomic History  Marital status:  Spouse name: Not on file  Number of children: Not on file  Years of education: Not on file  Highest education level: Not on file Occupational History  Not on file Social Needs  Financial resource strain: Not on file  Food insecurity:  
  Worry: Not on file Inability: Not on file  Transportation needs:  
  Medical: Not on file Non-medical: Not on file Tobacco Use  Smoking status: Former Smoker Types: Cigarettes  Smokeless tobacco: Never Used  Tobacco comment: years ago- not sure when Substance and Sexual Activity  Alcohol use: No  
 Drug use: No  
 Sexual activity: Not on file Lifestyle  Physical activity:  
  Days per week: Not on file Minutes per session: Not on file  Stress: Not on file Relationships  Social connections: Talks on phone: Not on file Gets together: Not on file Attends Denominational service: Not on file Active member of club or organization: Not on file Attends meetings of clubs or organizations: Not on file Relationship status: Not on file  Intimate partner violence:  
  Fear of current or ex partner: Not on file Emotionally abused: Not on file Physically abused: Not on file Forced sexual activity: Not on file Other Topics Concern  Not on file Social History Narrative  Not on file Current Facility-Administered Medications Medication Dose Route Frequency  aspirin (ASA) suppository 300 mg  300 mg Rectal DAILY  sodium chloride (NS) flush 5-40 mL  5-40 mL IntraVENous Q8H  
 sodium chloride (NS) flush 5-40 mL  5-40 mL IntraVENous PRN  
 acetaminophen (TYLENOL) tablet 650 mg  650 mg Oral Q4H PRN  
 ondansetron (ZOFRAN) injection 4 mg  4 mg IntraVENous Q4H PRN  
 lactated Ringers infusion  75 mL/hr IntraVENous CONTINUOUS  
 carvedilol (COREG) tablet 3.125 mg  3.125 mg Oral BID WITH MEALS  
 levETIRAcetam (KEPPRA) 1,500 mg in 0.9% sodium chloride 100 mL IVPB  1,500 mg IntraVENous Q12H  hydrALAZINE (APRESOLINE) 20 mg/mL injection 20 mg  20 mg IntraVENous Q6H PRN  pantoprazole (PROTONIX) 40 mg in sodium chloride 0.9% 10 mL injection  40 mg IntraVENous DAILY  metoprolol (LOPRESSOR) injection 2.5 mg  2.5 mg IntraVENous Q4H PRN  
 dilTIAZem (CARDIZEM) 125 mg in dextrose 5% 125 mL infusion  0-15 mg/hr IntraVENous TITRATE No Known Allergies Visit Vitals /51 Pulse 88 Temp 98.6 °F (37 °C) Resp 17 Ht 5' 8\" (1.727 m) Wt 51.1 kg (112 lb 10.5 oz) SpO2 96% BMI 17.13 kg/m² Physical Exam  
Constitutional: He appears well-developed. Pale elderly gentleman Cardiovascular: Normal rate. Pulmonary/Chest: Effort normal.  
Vitals reviewed. Neurologic Exam  
 
Mental Status Very pale elderly gentleman in bed. He opens his eyes to touch and voice. He does not follow any commands. Pupils are equal symmetric reactive slightly disconjugate gaze. 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 Face is asymmetric to the left, edentulous Cannot assess language or tongue. When examining his extremities he can activate the distal right hand flexing his fingers. He responds to pain and touch briskly in the lower extremities with withdrawal 
No abnormal movements Gait deferred due to condition Lab Results Component Value Date/Time WBC 7.3 10/25/2019 01:01 AM  
 HGB 11.6 (L) 10/25/2019 01:01 AM  
 HCT 34.9 (L) 10/25/2019 01:01 AM  
 PLATELET 717 80/72/4763 01:01 AM  
 .2 (H) 10/25/2019 01:01 AM  
 
Lab Results Component Value Date/Time Hemoglobin A1c 5.4 07/29/2014 06:12 AM  
 Glucose 105 (H) 10/25/2019 01:01 AM  
 Glucose (POC) 117 (H) 10/25/2019 01:39 AM  
 LDL, calculated 64.2 10/26/2019 04:50 AM  
 Creatinine 1.16 10/25/2019 01:01 AM  
  
Lab Results Component Value Date/Time Cholesterol, total 127 10/26/2019 04:50 AM  
 HDL Cholesterol 54 10/26/2019 04:50 AM  
 LDL, calculated 64.2 10/26/2019 04:50 AM  
 Triglyceride 44 10/26/2019 04:50 AM  
 CHOL/HDL Ratio 2.4 10/26/2019 04:50 AM  
 
Lab Results Component Value Date/Time ALT (SGPT) 16 10/25/2019 01:01 AM  
 AST (SGOT) 12 (L) 10/25/2019 01:01 AM  
 Alk. phosphatase 97 10/25/2019 01:01 AM  
 Bilirubin, total 0.6 10/25/2019 01:01 AM  
 Albumin 3.4 (L) 10/25/2019 01:01 AM  
 Protein, total 6.8 10/25/2019 01:01 AM  
 INR 1.0 10/25/2019 01:01 AM  
 Prothrombin time 10.4 10/25/2019 01:01 AM  
 PLATELET 541 55/38/8709 01:01 AM  
  
 
CT Results (maximum last 3): Results from Hospital Encounter encounter on 10/25/19 CT HEAD WO CONT Narrative INDICATION: post thrombectomy EXAM: CT HEAD without contrast.  
CT dose reduction was achieved through use of a standardized protocol tailored for this examination and automatic exposure control for dose modulation. COMPARISON: 10/25/2019, 0238 hours. FINDINGS: Unenhanced CT Head is performed. There is new hyperdensity in the left sylvian fissure region--uncertain if this 
represents petechial or subarachnoid blood and/or luxury perfusion in the 
ischemic bed from previous contrast injection. There is no shift, hydrocephalus or hyperdense extra-axial fluid collection. Impression IMPRESSION: New hypodensity in the left sylvian fissure region as discussed. No 
shift or hydrocephalus. CT CODE NEURO HEAD WO CONTRAST Narrative EXAM: CT CODE NEURO HEAD WO CONTRAST INDICATION: weakness COMPARISON: CT brain and CTA exam of earlier today. CONTRAST: None. TECHNIQUE: Unenhanced CT of the head was performed using 5 mm images. Brain and 
bone windows were generated. CT dose reduction was achieved through use of a 
standardized protocol tailored for this examination and automatic exposure 
control for dose modulation. FINDINGS: 
The ventricles and sulci are normal in size, shape and configuration and 
midline. There is no significant change in mild diffuse atrophy and 
periventricular white matter hypodensity. Contrast from prior CTA injection 
remains within the intravascular space with focal relative hyperdensity noted in 
the left M2 within the sylvian fissure. There is no intracranial hemorrhage, 
extra-axial collection, mass, mass effect or midline shift. The basilar 
cisterns are open. There is no evident loss of gray-white distinction. . The bone 
windows demonstrate no abnormalities. The visualized portions of the paranasal 
sinuses and mastoid air cells are clear. Impression IMPRESSION: Focal hypodensity in left M2 segment within the sylvian fissure site 
of previously identified occlusion. No identified loss of gray-white distinction 
with no significant change from prior otherwise. MRI Results (maximum last 3): Results from [facility] [place] Encounter encounter on 02/15/11 MRI BRAIN W AND W/O CONTRAST Narrative **Final Report** 
  
 
ICD Codes / Adm. Diagnosis: 780.97  345.40 / ALTERED MENTAL STATUS  PART EPIL  
W IMP CONSC W/O INTR Examination:  MR BRAIN W AND Lorraine Oneal  - [id number] - Feb 15 2011  2:33PM 
Accession No:  [id number] Reason:  ALTERED MENTAL STATUS, EPILEPSY REPORT: 
INDICATION: Altered mental status, memory loss, seizures 780.97, 345.4, 345.1 COMPARISON: MRI 01/18/2010 EXAM: Sagittal T1-weighted spin-echo, axial FLAIR, axial and coronal  
T2-weighted fast spin-echo, axial diffusion weighted echo planar, axial  
T1-weighted spin-echo, axial gradient echo, and post IV contrast-enhanced  
axial, sagittal and coronal T1-weighted spin-echo MR images of the brain are  
obtained. A total of 13 cc intravenous Magnevist was administered for the  
study. FINDINGS: Mild prominence the ventricles and cortical sulci consistent with  
atrophy is again shown. Several small nonspecific foci of white matter  
signal change in the cerebrum bilaterally are again noted and unchanged. There is no acute infarction. No intra-axial or extra-axial mass or  
enhancement abnormality is shown. The vascular flow-voids at the base of the  
brain remain normal in conspicuity. Sella, optic chiasm, posterior fossa and  
orbits remain normal. Moderate mucosal thickening of the left maxillary  
sinus is shown, increased from previous exam. 
   
 
IMPRESSION: Mild atrophy and evidence for foci of chronic small vessel  
ischemic disease of the white matter. No evidence for acute infarction, mass  
or enhancement abnormality, or substantial change. Interpreting/Reading Doctor: Feli Ledezma. Chrissy Chacon ([id number]) Transcribed:  on 02/15/2011 Approved: SIL Chacon ([id number])  02/15/2011 Distribution:  Attending Doctor: Beau Avila 
  
 
   
 Results from Hospital Encounter encounter on 01/18/10 MRI BRAIN W AND W/O CONTRAST Narrative Final Report ICD Codes / Adm. Diagnosis:    /   RAD Examination:  BRAIN W AND WO CON  - [id number] - Jan 18 2010  9:52AM 
Accession No:  [id number] Reason:  RAD 
 
 
REPORT: 
INDICATION:   See indication above. COMPARISON:  None. TECHNIQUE:   
MR imaging of the brain was performed with sagittal T1, axial T1, T2, FLAIR,  
GRE, DWI/ADC; pre and post contrast multiplanar T1 utilizing 13 mL Magnevist. 
 
FINDINGS:   
The ventricles are normal in size and are midline. There is no   
intracranial hemorrhage  or extra-axial fluid collection. There is no  
significant white matter disease. There is no acute infarction. The major  
intracranial vascular flow-voids are patent. There is no abnormal  
parenchymal or meningeal enhancement. The paranasal sinuses and mastoid air  
cells are clear. IMPRESSION:  No abnormalities demonstrated. Interpreting/Reading Doctor: Krys Archuleta ([id number]) Transcribed: n/a on 01/18/2010 Eula Gomez ([id number])  01/18/2010 Distribution:  Attending Doctor: Elvira Russell Alternate Doctor: Elvira Russell 
 
   
 
 
VAS/US/Carotid Doppler Results (maximum last 3): No results found for this or any previous visit. PET Results (maximum last 3): No results found for this or any previous visit. Assessment and Plan 22-year-old gentleman who has developed an acute left MCA infarct. Suspicious for cardioembolic. No evidence of significant intracranial atherosclerosis. Continue aspirin [place]. MRI and MRA are pending. Need to see the extent of the infarct. He does have dementia and epilepsy so he has very low threshold to be obtunded. Check a new echo. Continue Keppra at current outpatient dosing. Blood pressure parameters per neuro interventional post proc protocol. Following This clinical note was dictated with an electronic dictation software that can make unintentional errors. If there are any questions, please contact me directly for clarification. [address] [facility], DO 
NEUROLOGIST Diplomate MITZI 
10/26/2019

## 2019-10-26 NOTE — PROGRESS NOTES
0730- Bedside shift change report given to Rosalio Dietrich RN (oncoming nurse) by Dusty Ritchie RN (offgoing nurse). Report included the following information SBAR, Kardex, Procedure Summary, Intake/Output, MAR, Accordion, Recent Results, Med Rec Status, Cardiac Rhythm SR/A. Fib and Alarm Parameters . 0900- Primary Nurse Sorin Brar RN and Zane ANTONY RN performed a dual skin assessment on this patient No impairment noted Sreekanth score is 13 
 
1915- Questionable ST depression on cardiac monitor. EKG done. 1- Spoke with Lisa Thomas NP regarding EKG changes. Order received for troponin and BMP.

## 2019-10-26 NOTE — PROGRESS NOTES
Hospitalist Progress Note Baron Amrita MD 
Answering service: [phone] OR [phone] from in house phone Date of Service:  10/26/2019 NAME:  Verona Phipps Sr 
:  1927 MRN:  [id number] Admission Summary:  
 
Etelvina Mclaughlin is a 26-year-old male who is independent in the activities of daily life at baseline, and lives at home with his daughter. Patient does have a history of seizures, and these episodes are characterized by blank stares and not responding to the environment around him. Patients daughter states that patient never had convulsions or shaking. Patient had such an episode yesterday afternoon. Patients daughter states that however, this is the first seizure-like episode in a long while. He usually does not have seizures more than once every year or every few years. Patients daughter states that patient became responsive after arrival to the emergency department and head CT was negative, and he was discharged home. Once he got home that evening, patient  became unresponsive again. Patients daughter called her son who is their next-door neighbor. He came over. Initially, they thought that he was having another seizure. But then it appeared that he seem to want to communicate, but could not. They asked him questions, and he got it yes and no. They asked him if he needed to go to the bathroom, and he answered yes. However, he appeared not to be able to move his arms and legs. They didnt notice that one side was weaker than the other. 
  
Upon arrival to the emergency department at [facility], patient continued to be aphasic. 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 Patient was then transferred to [facility] where he underwent left MCA distal M2 mechanical thrombectomy.  
 
Interval history / Subjective:  
 
Patient is lethargic but open his eyes to voice,  
 
 Assessment & Plan:  
 
Aphasic due to acute [facility] M2 occlusion 
-s/p thrombectomy left MCA distal M2 
-pr cardene gtt to keep SBP <160 
-on aspirin 300 mg rectal, keppra 
-MRA of brain on 10/26 moderate to large area of infarction most confluent in the left frontal lobe extending into the left corona radiata with areas of left confluent infarction identified in the left parietal lobe. -continue neuro check 
-speech consulted 
-Neurointerventional surgeon and neurologist on boar Paroxysmal A Fib, rat controlled 
-on prn cardizem gtt and aspirin 81 mg at home 
-continue cardiac monitoring Seizure -EEG 10/25 mildly abnormal EEG due to mild slowing of the background rhythms. 
-on keppra  
-seizure precaution 
-neurologist on board HTN 
-BP stable, on cardene gtt, coreg, monitor BP Hx of CAD 
-on coreg, aspirin suppositor Anemia of chronic disease  
-stable, monitor H/H Mild hypokalemia 
-resolved Hx of hypercholesterolemia  
-will resume statin possible vis NGT Moderate Protein-Calorie Malnutrition 
-nutrition consult Full Code: at risk for decompensation and inpatient mortality Code status: Full Code DVT prophylaxis: SCD Care Plan discussed with: Patient/Family and Nurse Disposition: TBD Hospital Problems  Date Reviewed: 10/8/2019 Codes Class Noted POA Cerebrovascular accident (CVA) ([facility] [address] ICD-10-CM: I63.9 ICD-9-CM: 434.91  10/25/2019 Unknown Vital Signs:  
 Last 24hrs VS reviewed since prior progress note. Most recent are: 
Visit Vitals /62 Pulse 85 Temp 99.1 °F (37.3 °C) Resp 16 Ht 5' 8\" (1.727 m) Wt 51.1 kg (112 lb 10.5 oz) SpO2 97% BMI 17.13 kg/m² Intake/Output Summary (Last 24 hours) at 10/26/2019 1317 Last data filed at 10/26/2019 1200 Gross per 24 hour Intake 1471.75 ml Output 935 ml Net 536.75 ml Physical Examination:  
 
 
     
Constitutional:  No acute distress, cooperative, pleasant ENT:  Oral mucous moist, oropharynx benign. Resp:  CTA bilaterally. No wheezing/rhonchi/rales. No accessory muscle use CV:  Irregular rhythm, normal rate, no murmurs, gallops, rubs GI:  Soft, non distended, non tender. normoactive bowel sounds, no hepatosplenomegaly Musculoskeletal:  No edema Neurologic:  Lethargic but wakeful, non verbal  
  Skin:  Good turgor, no rashes or ulcers Data Review:  
 Review and/or order of clinical lab test 
Review and/or order of tests in the radiology section of CPT Review and/or order of tests in the medicine section of CPT Labs:  
 
Recent Labs 10/25/19 
0101 10/24/19 
1415 WBC 7.3 7.3 HGB 11.6* 11.9*  
HCT 34.9* 36.7  177 Recent Labs 10/25/19 
0101 10/24/19 
1415 * 136  
K 3.7 4.1  104 CO2 27 27 BUN 25* 27* CREA 1.16 1.05  
* 93  
CA 8.7 8.5 Recent Labs 10/25/19 
0101 10/24/19 
1415 SGOT 12* 16 ALT 16 20 AP 97 103 TBILI 0.6 0.3 TP 6.8 7.3 ALB 3.4* 3.5 GLOB 3.4 3.8 Recent Labs 10/25/19 
0101 INR 1.0 PTP 10.4 APTT 25.4 No results for input(s): FE, TIBC, PSAT, FERR in the last 72 hours. No results found for: FOL, RBCF No results for input(s): PH, PCO2, PO2 in the last 72 hours. No results for input(s): CPK, CKNDX, TROIQ in the last 72 hours. No lab exists for component: CPKMB Lab Results Component Value Date/Time Cholesterol, total 127 10/26/2019 04:50 AM  
 HDL Cholesterol 54 10/26/2019 04:50 AM  
 LDL, calculated 64.2 10/26/2019 04:50 AM  
 Triglyceride 44 10/26/2019 04:50 AM  
 CHOL/HDL Ratio 2.4 10/26/2019 04:50 AM  
 
Lab Results Component Value Date/Time Glucose (POC) 117 (H) 10/25/2019 01:39 AM  
 Glucose (POC) 102 (H) 10/24/2019 02:00 PM  
 Glucose (POC) 149 (H) 09/29/2018 06:39 AM  
 Glucose (POC) 102 10/02/2011 09:22 PM  
 Glucose (POC) 138 (H) 10/02/2011 04:30 PM  
 
Lab Results Component Value Date/Time Color YELLOW/STRAW 10/24/2019 03:20 PM  
 Appearance CLEAR 10/24/2019 03:20 PM  
 Specific gravity 1.013 10/24/2019 03:20 PM  
 pH (UA) 7.0 10/24/2019 03:20 PM  
 Protein NEGATIVE  10/24/2019 03:20 PM  
 Glucose NEGATIVE  10/24/2019 03:20 PM  
 Ketone NEGATIVE  10/24/2019 03:20 PM  
 Bilirubin NEGATIVE  10/24/2019 03:20 PM  
 Urobilinogen 0.2 10/24/2019 03:20 PM  
 Nitrites NEGATIVE  10/24/2019 03:20 PM  
 Leukocyte Esterase NEGATIVE  10/24/2019 03:20 PM  
 Epithelial cells FEW 10/24/2019 03:20 PM  
 Bacteria NEGATIVE  10/24/2019 03:20 PM  
 WBC 0-4 10/24/2019 03:20 PM  
 RBC 0-5 10/24/2019 03:20 PM  
 
 
 
Medications Reviewed:  
 
Current Facility-Administered Medications Medication Dose Route Frequency  aspirin (ASA) suppository 300 mg  300 mg Rectal DAILY  sodium chloride (NS) flush 5-40 mL  5-40 mL IntraVENous Q8H  
 sodium chloride (NS) flush 5-40 mL  5-40 mL IntraVENous PRN  
 acetaminophen (TYLENOL) tablet 650 mg  650 mg Oral Q4H PRN  
 ondansetron (ZOFRAN) injection 4 mg  4 mg IntraVENous Q4H PRN  
 lactated Ringers infusion  75 mL/hr IntraVENous CONTINUOUS  
 carvedilol (COREG) tablet 3.125 mg  3.125 mg Oral BID WITH MEALS  
 levETIRAcetam (KEPPRA) 1,500 mg in 0.9% sodium chloride 100 mL IVPB  1,500 mg IntraVENous Q12H  hydrALAZINE (APRESOLINE) 20 mg/mL injection 20 mg  20 mg IntraVENous Q6H PRN  pantoprazole (PROTONIX) 40 mg in sodium chloride 0.9% 10 mL injection  40 mg IntraVENous DAILY  metoprolol (LOPRESSOR) injection 2.5 mg  2.5 mg IntraVENous Q4H PRN  
 dilTIAZem (CARDIZEM) 125 mg in dextrose 5% 125 mL infusion  0-15 mg/hr IntraVENous TITRATE  
 
______________________________________________________________________ EXPECTED LENGTH OF STAY: 4d 7h 
ACTUAL LENGTH OF STAY:          1 Tameka Deras MD

## 2019-10-26 NOTE — PROGRESS NOTES
MRI/MRA reviewed. Relatively large left MCA infarct, mostly cortical despite EVT. Difficult to visualize on CT. Scattered infarcts in other territories confirms central source as expected. OK to liberalize BP from my standpoint Defer [facility] to neurology. We are available as needed. [phone]

## 2019-10-27 PROBLEM — I63.9 ACUTE CVA (CEREBROVASCULAR ACCIDENT) (HCC): Status: ACTIVE | Noted: 2019-01-01

## 2019-10-27 NOTE — CONSULTS
Cardiology Consult/Progress Note    
 
 
 
10/25/2019  2:46 AM  
Cerebrovascular accident (CVA) ([facility] [address] [I63.9] Subjective:  
Patient is difficult to get history from with significant aphasia. He nods against having chest pain. Reason for visit/consult: Troponin elevation/ECG changes Guanako Callejas is a 80 y.o. male admitted for Cerebrovascular accident (CVA) ([facility] [address] [I63.9]. He had a large left MCA stroke and underwent thrombectomy. He still had significant residual deficit. There is slight concern of possible hemorrhagic conversion. During the last 24 hours ECG changes were noticed on his telemetry. Hence a formal ECG was performed which demonstrated ST depressions in lateral precordial leads. Additionally troponins were performed which have been serially elevated. Cardiac risk factors: He does not have any prior history of coronary disease Assessment and PLAN 1. Recent left MCA stroke status post thrombectomy with large residual area of infarct. At high risk of hemorrhagic conversion with anticoagulation per neurology 2. Likely non-ST elevation microinfarction versus stress cardiomyopathy. Ideally I would consider anticoagulating patient along with antiplatelet therapy however given recommendation from neurology risk of using these agents would be high for hemorrhagic conversion. Based on his initial echocardiogram I believe that ischemic burden on the heart is relatively low limited to the marginal branch territory and could be conservatively managed. Risk-benefit would favor against coronary angiogram or anticoagulation. 3.  Hypertension 4. Hyperlipidemia I would recommend conservative management with maintenance of blood pressure between 120-140 and heart rate less than 80 if possible. Also recommend consideration of statins.   Depending on his neurological recovery we may consider downstream angiography once anticoagulation and antiplatelets are safe. His echocardiogram was performed earlier today and demonstrates wall motion abnormality in the circumflex/marginal artery territory but otherwise overall LV systolic function is normal and there are no other concerning factors which would indicate imminent cardiogenic shock/cardiac decompensation. This was discussed with the daughter and granddaughter who agreed with the strategy at this point of time. [x]    High complexity decision making was performed 
[]    Patient is at high-risk of decompensation with multiple organ involvement Investigation Telemetry: Sinus rhythm, intermittent sinus tachycardia, frequent PVCs ECG: Sinus rhythm with first-degree AV block, ST depressions in lateral precordial leads consistent with ischemia. Troponin normal to 8.2 to 12.4 Echocardiogram today overall LV function 55%, mild to moderate aortic stenosis, wall motion of normality and obtuse marginal distribution. Social History Socioeconomic History  Marital status:  Spouse name: Not on file  Number of children: Not on file  Years of education: Not on file  Highest education level: Not on file Tobacco Use  Smoking status: Former Smoker Types: Cigarettes  Smokeless tobacco: Never Used  Tobacco comment: years ago- not sure when Substance and Sexual Activity  Alcohol use: No  
 Drug use: No  
  
Patient Active Problem List  
 Diagnosis Date Noted  Cerebrovascular accident (CVA) ([facility] [address] 10/25/2019  Acute encephalopathy 09/29/2018  Atrial fibrillation with RVR ([facility] [address] 09/29/2018  Alzheimer's type dementia with late onset without behavioral disturbance ([facility] [address] 06/12/2017  Complex partial seizures with consciousness impaired ([facility] [address] 12/21/2015  Elevated troponin 07/29/2014  CAD (coronary artery disease) 07/28/2014  
 HTN (hypertension) 07/28/2014  Hyperlipidemia 07/28/2014 Tala Parish NP Past Medical History: Diagnosis Date  CAD (coronary artery disease)  Hypercholesteremia  Hypertension  Seizures ([address]   
 last seizure in 2011 per pt Past Surgical History:  
Procedure Laterality Date  CARDIAC SURG PROCEDURE UNLIST    
 cardiac stents [address]  HX CHOLECYSTECTOMY  2007  IR PERC ART [facility] THROMB INFUSION INTRACRANIAL  10/25/2019  AR EGD BALLOON DILATION ESOPHAGUS <30 MM DIAM  11/21/2012 No Known Allergies Family History Problem Relation Age of Onset  Alcohol abuse Mother Current Facility-Administered Medications Medication Dose Route Frequency  dilTIAZem (CARDIZEM) 125 mg in dextrose 5% 125 mL infusion  0-15 mg/hr IntraVENous TITRATE  metoprolol (LOPRESSOR) injection 5 mg  5 mg IntraVENous Q1H PRN  
 hydrALAZINE (APRESOLINE) 20 mg/mL injection 20 mg  20 mg IntraVENous Q6H PRN  
 aspirin (ASA) suppository 300 mg  300 mg Rectal DAILY  sodium chloride (NS) flush 5-40 mL  5-40 mL IntraVENous Q8H  
 sodium chloride (NS) flush 5-40 mL  5-40 mL IntraVENous PRN  
 acetaminophen (TYLENOL) tablet 650 mg  650 mg Oral Q4H PRN  
 ondansetron (ZOFRAN) injection 4 mg  4 mg IntraVENous Q4H PRN  
 lactated Ringers infusion  75 mL/hr IntraVENous CONTINUOUS  
 carvedilol (COREG) tablet 3.125 mg  3.125 mg Oral BID WITH MEALS  
 levETIRAcetam (KEPPRA) 1,500 mg in 0.9% sodium chloride 100 mL IVPB  1,500 mg IntraVENous Q12H  pantoprazole (PROTONIX) 40 mg in sodium chloride 0.9% 10 mL injection  40 mg IntraVENous DAILY Prior to Admission Medications Prescriptions Last Dose Informant Patient Reported? Taking? ASCORBATE CALCIUM (VITAMIN C PO)   Yes No  
Sig: Take  by mouth daily. Calcium-Cholecalciferol, D3, 600 mg(1,500mg) -400 unit chew   Yes No  
Sig: Take  by mouth daily. aspirin 81 mg chewable tablet   Yes No  
Sig: Take 81 mg by mouth nightly.   
carvedilol (COREG) 3.125 mg tablet   No No  
 Sig: Take 1 Tab by mouth two (2) times daily (with meals). cyanocobalamin (VITAMIN B-12) 2,500 mcg sublingual tablet   Yes No  
Sig: Take 5,000 mcg by mouth daily. dilTIAZem CD (CARDIZEM CD) 180 mg ER capsule   No No  
Sig: Take 1 Cap by mouth daily. galantamine (RAZADYNE) 8 mg SR capsule   No No  
Sig: take 1 capsule by mouth daily before BREAKFAST  
levETIRAcetam (KEPPRA) 500 mg tablet   No No  
Sig: take 3 tablets by mouth twice a day  
rosuvastatin (CRESTOR) 5 mg tablet   Yes No  
Sig: Take 5 mg by mouth daily. Facility-Administered Medications: None Labs:  
Recent Results (from the past 24 hour(s)) EKG, 12 LEAD, INITIAL Collection Time: 10/26/19  7:17 PM  
Result Value Ref Range Ventricular Rate 89 BPM  
 Atrial Rate 89 BPM  
 P-R Interval 236 ms QRS Duration 92 ms Q-T Interval 340 ms QTC Calculation (Bezet) 413 ms Calculated P Axis 67 degrees Calculated R Axis 14 degrees Calculated T Axis -162 degrees Diagnosis Sinus rhythm with sinus arrhythmia with 1st degree AV block Septal infarct (cited on or before 26-OCT-2019) Marked ST abnormality, possible anterolateral subendocardial injury When compared with ECG of 24-OCT-2019 14:16, 
Vent. rate has increased BY  30 BPM 
ST now depressed in Anterolateral leads T wave inversion now evident in Inferior leads T wave inversion now evident in Anterolateral leads TROPONIN I Collection Time: 10/27/19  2:44 AM  
Result Value Ref Range Troponin-I, Qt. 8.21 (H) <0.05 ng/mL ASPIRIN TEST Collection Time: 10/27/19  2:44 AM  
Result Value Ref Range Aspirin test 477 ARU  
CBC W/O DIFF Collection Time: 10/27/19  2:44 AM  
Result Value Ref Range WBC 11.7 (H) 4.1 - 11.1 K/uL  
 RBC 3.36 (L) 4.10 - 5.70 M/uL  
 HGB 11.3 (L) 12.1 - 17.0 g/dL HCT 33.9 (L) 36.6 - 50.3 % .9 (H) 80.0 - 99.0 FL  
 MCH 33.6 26.0 - 34.0 PG  
 MCHC 33.3 30.0 - 36.5 g/dL  
 RDW 12.4 11.5 - 14.5 % PLATELET 838 597 - 710 K/uL MPV 10.9 8.9 - 12.9 FL  
 NRBC 0.0 0  WBC ABSOLUTE NRBC 0.00 0.00 - 0.01 K/uL MAGNESIUM Collection Time: 10/27/19  2:44 AM  
Result Value Ref Range Magnesium 2.0 1.6 - 2.4 mg/dL PHOSPHORUS Collection Time: 10/27/19  2:44 AM  
Result Value Ref Range Phosphorus 3.0 2.6 - 4.7 MG/DL  
TROPONIN I Collection Time: 10/27/19  8:30 AM  
Result Value Ref Range Troponin-I, Qt. 12.40 (H) <0.05 ng/mL ECHO ADULT COMPLETE Collection Time: 10/27/19  9:23 AM  
Result Value Ref Range LA Volume 72.57 18 - 58 mL  
 LV E' Lateral Velocity 8.77 cm/s LV E' Septal Velocity 4.96 cm/s Tapse 1.73 1.5 - 2.0 cm Ao Root D 3.36 cm Aortic Valve Systolic Peak Velocity 932.67 cm/s AoV VTI 45.86 cm Aortic Valve Area by Continuity of Peak Velocity 1.7 cm2 Aortic Valve Area by Continuity of VTI 1.7 cm2 AoV PG 12.4 mmHg LVIDd 4.77 4.2 - 5.9 cm  
 LVPWd 0.84 0.6 - 1.0 cm LVIDs 3.23 cm IVSd 0.79 0.6 - 1.0 cm  
 LVOT d 2.12 cm  
 LVOT Peak Velocity 86.05 cm/s LVOT Peak Gradient 3.0 mmHg LVOT VTI 22.26 cm  
 MVA (PHT) 3.5 cm2  
 MV A [place] 139.79 cm/s  
 MV E [place] 106.66 cm/s  
 MV E/A 0.80 Left Atrium to Aortic Root Ratio 1.06   
 RVIDd 2.37 cm Aortic Valve Systolic Mean Gradient 7.2 mmHg LA Vol 4C 69.27 (A) 18 - 58 mL  
 LA Vol 2C 55.93 18 - 58 mL  
 LA Area 4C 21.4 cm2 LV Mass .9 88 - 224 g LV Mass AL Index 91.2 49 - 115 g/m2 E/E' lateral 12.16   
 E/E' septal 21.50 E/E' ratio (averaged) 16.83 Mitral Valve E Wave Deceleration Time 216.1 ms  
 Mitral Valve Pressure Half-time 62.7 ms Left Atrium Major Axis 3.55 cm Triscuspid Valve Regurgitation Peak Gradient 34.0 mmHg Aortic Regurgitant Pressure Half-time 512.2 cm Pulmonic Valve Max Velocity 116.83 cm/s LVOT SV 78.6 ml  
 TR Max Velocity 291.69 cm/s  
 LA Vol Index 45.35 16 - 28 ml/m2 LA Vol Index 34.95 16 - 28 ml/m2 LA Vol Index 43.29 16 - 28 ml/m2 AV Cusp 1.64 cm  
 AR Max [place] 363.66 cm/s Left Ventricular Fractional Shortening by 2D 08.536734198 % Left Ventricular Outflow Tract Mean Gradient 1.7756955097098 mmHg Left Ventricular Outflow Tract Mean Velocity 8.86793421729487 cm/s Mitral Valve Deceleration [place] 3.259373377908 AV Velocity Ratio 0.49   
 AV VTI Ratio 0.5 Aortic valve mean velocity 4.57766563457 m/s AV peak gradient 52.742910876 mmHg PV peak gradient 5.5 mmHg Review of Symptoms: 
Respiratory: No exertional dyspnea, orthopnea, PND, cough, hemoptysis, URI. Cardiovascular: No CP, palpitations, sweating, lightheadedness, dizziness, syncope, presyncope, lower extremity swelling. Otherwise no other pertinent positive or negative symptoms on ROS. Subjective: Intake/Output Summary (Last 24 hours) at 10/27/2019 1136 Last data filed at 10/27/2019 1100 Gross per 24 hour Intake 2125.96 ml Output 865 ml Net 1260.96 ml Patient Vitals for the past 24 hrs: 
 Temp Pulse Resp BP SpO2  
10/27/19 1100  77 27 114/53 97 % 10/27/19 1030  67 23  96 % 10/27/19 1015  (!) 55 21  96 % 10/27/19 1000  67 30 134/62 93 % 10/27/19 0930  60 (!) 31  97 % 10/27/19 0923    139/55   
10/27/19 0915  60 21  99 % 10/27/19 0900  (!) 54 18 139/55 98 % 10/27/19 0830  (!) 57 17  98 % 10/27/19 0800 98.1 °F (36.7 °C) (!) 58 28 125/55 97 % 10/27/19 0700  70 24 145/66 98 % 10/27/19 0615  73 25 135/80 99 % 10/27/19 0500  70 (!) 31 (!) 128/113 97 % 10/27/19 0400 97 °F (36.1 °C) (!) 57 19 137/73 99 % 10/27/19 0300  74 28 (!) 130/92 100 % 10/27/19 0200  75 28 126/69 97 % 10/27/19 0100  84 21 144/89   
10/27/19 0000 99.6 °F (37.6 °C) 76 21 124/75 94 % 10/26/19 2300  97 29 134/59 92 % 10/26/19 2200  95 25 127/52 91 % 10/26/19 2100  90 25 131/64 (!) 89 % 10/26/19 2000 99.6 °F (37.6 °C) 97 23 137/65 95 % 10/26/19 1900  (!) 112 23 137/71 93 % 10/26/19 1830  98 19  95 % 10/26/19 1800  98 21 131/66 94 % 10/26/19 1730  (!) 104 22  95 % 10/26/19 1700  93 16 130/57 95 % 10/26/19 1630  97 17  95 % 10/26/19 1600 99.2 °F (37.3 °C) 86 18 132/47 96 % 10/26/19 1530  (!) 104 18  97 % 10/26/19 1500  92 15 118/57 95 % 10/26/19 1430  (!) 106 18  97 % 10/26/19 1400  97 17 123/56 97 % 10/26/19 1337  90 20 148/67 97 % 10/26/19 1336  90 16  96 % 10/26/19 1239  85 16 139/62 97 % 10/26/19 1230  87 14  96 % 10/26/19 1200 99.1 °F (37.3 °C) 81 19 130/68 97 % General:    Poorly responsive Psychiatric:    Unable to assess Eye/ENT:      Pupils equal, No asymmetry, Conjunctival pink. Able to hear voice at normal amplitude Lungs:      Visibly symmetric chest expansion, No palpable tenderness. Clear to auscultation bilaterally. Heart[de-identified]    Regular rate and rhythm, S1, S2 normal, no murmur, click, rub or gallop. No JVD, Normal palpable peripheral pulses. No cyanosis Abdomen:     Soft, non-tender. Bowel sounds normal. No masses,  No   
  organomegaly. Extremities:   Extremities normal, atraumatic, no edema. Neurologic:    Unable to do a formal neuro examination. Significant aphasia Milagros Dow MD 
10/27/2019  
11:36 AM  
 
[facility] Office:   [address] [facility] Office: 
[address]     [address] [facility] [address]     [address] [place] [address] P: [id number]    P: [phone] F: [phone]    F: [phone]

## 2019-10-27 NOTE — DISCHARGE SUMMARY
Discharge Summary PATIENT ID: Author Alexa Licea 
MRN: [id number] YOB: 1927 DATE OF ADMISSION: 10/25/2019  2:46 AM   
DATE OF DISCHARGE: 10/25/2019 PRIMARY CARE PROVIDER: Riaz Rose NP  
 
ATTENDING PHYSICIAN: Carson Ibarra MD 
DISCHARGING PROVIDER: Ran Gustafson MD   
To contact this individual call [phone] and ask the  to page. If unavailable ask to be transferred the Adult Hospitalist Department. CONSULTATIONS: IP CONSULT TO CARDIOLOGY 
IP CONSULT TO CARDIOLOGY PROCEDURES/SURGERIES: * No surgery found * [id number] [facility] COURSE:  
 
Jeana Aguilar is a 80-year-old male who is independent in the activities of daily life at baseline, and lives at home with his daughter. Patient does have a history of seizures, and these episodes are characterized by blank stares and not responding to the environment around him. Patients daughter states that patient never had convulsions or shaking. Patient had such an episode yesterday afternoon. Patients daughter states that however, this is the first seizure-like episode in a long while. He usually does not have seizures more than once every year or every few years. Patients daughter states that patient became responsive after arrival to the emergency department and head CT was negative, and he was discharged home. 
 Once he got home that evening, patient  became unresponsive again. Patients daughter called her son who is their next-door neighbor. He came over. Initially, they thought that he was having another seizure. But then it appeared that he seem to want to communicate, but could not. They asked him questions, and he got it yes and no. They asked him if he needed to go to the bathroom, and he answered yes. However, he appeared not to be able to move his arms and legs.  They didnt notice that one side was weaker than the other. 
  
 Upon arrival to the emergency department at [facility], patient continued to be aphasic. 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 Patient was then transferred to [facility] where he underwent left MCA distal M2 mechanical thrombectomy. Comfort Care per family request on 10/27/19 
-transfer to hospice care for comfort care Acute CVA due to acute [facility] M2 occlusion 
-s/p thrombectomy left MCA distal M2 
-pr cardene gtt to keep SBP <160 
-on aspirin 300 mg rectal, keppra 
-MRA of brain on 10/26 moderate to large area of infarction most confluent in the left frontal lobe extending into the left corona radiata with areas of left confluent infarction identified in the left parietal lobe. -continue neuro check 
-speech consulted 
-palliative care team consult 
-Neurointerventional surgeon and neurologist on boar  
Paroxysmal A Fib, rat controlled 
-on prn cardizem gtt and aspirin 300 mg suppository  
-continue cardiac monitoring  
Seizure -EEG 10/25 mildly abnormal EEG due to mild slowing of the background rhythms. 
-on keppra  
-seizure precaution 
-neurologist on board  
HTN 
-BP stable, on cardene gtt, coreg, monitor BP Hx of CAD 
-on coreg, aspirin suppositor Anemia of chronic disease  
-stable, monitor H/H Mild hypokalemia 
-resolved Hx of hypercholesterolemia  
-will resume statin possible vis NGT Moderate Protein-Calorie Malnutrition 
-nutrition consult 
  
DNR: poor prognosis, consult to hospice care team for possible comfort care  
  
Code status: Full Code DVT prophylaxis: SCD 
 
DISCHARGE DIAGNOSES / PLAN:   
 
Acute CVA due to acute [facility] M2 occlusion- poor prognosis, comfort care per family request 
Paroxysmal A Fib Seizure PENDING TEST RESULTS:  
At the time of discharge the following test results are still pending: None FOLLOW UP CARE Inpatient hospice care DIET: NPO 
 
ACTIVITY: Bedrest 
 
WOUND CARE: None EQUIPMENT needed: None DISCHARGE MEDICATIONS: 
Discharge Medication List as of 10/27/2019  6:36 PM  
  
 
None DISPOSITION: 
  Home With: 
 OT  PT  HH  RN  
  
 Long term SNF/Inpatient Rehab Independent/assisted living  
x Hospice Other:  
 
 
PATIENT CONDITION AT DISCHARGE:  
 
Functional status  
x Poor Deconditioned Independent Cognition  
x  Non verbal  
 Forgetful Dementia Catheters/lines (plus indication) Franco PICC   
 PEG   
x None Code status Full code   
x DNR PHYSICAL EXAMINATION AT DISCHARGE: 
Patient Vitals for the past 24 hrs: 
 Temp Pulse Resp BP SpO2  
10/27/19 1800  79 28 120/62 95 % 10/27/19 1700  89 24 142/65 96 % 10/27/19 1600 99.1 °F (37.3 °C) 82 (!) 32 126/61   
10/27/19 1500  90 29 (!) 121/99   
10/27/19 1400  90 28 119/52 97 % 10/27/19 1300  79 23 110/49   
10/27/19 1201  70 23  97 % 10/27/19 1200 98.1 °F (36.7 °C) 80 28 118/51 99 % 10/27/19 1130  82 24  95 % 10/27/19 1100  77 27 114/53 97 % 10/27/19 1030  67 23  96 % 10/27/19 1015  (!) 55 21  96 % 10/27/19 1000  67 30 134/62 93 % 10/27/19 0930  60 (!) 31  97 % 10/27/19 0923    139/55   
10/27/19 0915  60 21  99 % 10/27/19 0900  (!) 54 18 139/55 98 % 10/27/19 0830  (!) 57 17  98 % 10/27/19 0800 98.1 °F (36.7 °C) (!) 58 28 125/55 97 % 10/27/19 0700  70 24 145/66 98 % 10/27/19 0615  73 25 135/80 99 % 10/27/19 0500  70 (!) 31 (!) 128/113 97 % 10/27/19 0400 97 °F (36.1 °C) (!) 57 19 137/73 99 % 10/27/19 0300  74 28 (!) 130/92 100 % 10/27/19 0200  75 28 126/69 97 % 10/27/19 0100  84 21 144/89   
10/27/19 0000 99.6 °F (37.6 °C) 76 21 124/75 94 % 10/26/19 2300  97 29 134/59 92 % 10/26/19 2200  95 25 127/52 91 % 10/26/19 2100  90 25 131/64 (!) 89 % 10/26/19 2000 99.6 °F (37.6 °C) 97 23 137/65 95 % 10/26/19 1900  (!) 112 23 137/71 93 % General:          Alert, no distress, appears stated age. HEENT:           Anicteric sclerae, pink conjunctivae No oral ulcers, mucosa moist, throat clear, dentition fair Neck:               Supple, symmetrical 
Lungs:             Clear to auscultation bilaterally. No Wheezing or Rhonchi. No rales. Chest wall:      No tenderness  No Accessory muscle use. Heart:              Regular  rhythm,  No  murmur   No edema Abdomen:        Soft, non-tender. Not distended. Bowel sounds normal 
Extremities:     No cyanosis. No clubbing,   
                        Skin turgor normal, Capillary refill normal 
Skin:                Not pale. Not Jaundiced  No rashes Psych:             Not anxious or agitated. Neurologic:      Lethargic but wakeful, aphasic Recent Results (from the past 24 hour(s)) EKG, 12 LEAD, INITIAL Collection Time: 10/26/19  7:17 PM  
Result Value Ref Range Ventricular Rate 89 BPM  
 Atrial Rate 89 BPM  
 P-R Interval 236 ms QRS Duration 92 ms Q-T Interval 340 ms QTC Calculation (Bezet) 413 ms Calculated P Axis 67 degrees Calculated R Axis 14 degrees Calculated T Axis -162 degrees Diagnosis Sinus rhythm with sinus arrhythmia with 1st degree AV block Septal infarct (cited on or before 26-OCT-2019) Marked ST abnormality, possible anterolateral subendocardial injury When compared with ECG of 24-OCT-2019 14:16, 
Vent. rate has increased BY  30 BPM 
ST now depressed in Anterolateral leads T wave inversion now evident in Inferior leads T wave inversion now evident in Anterolateral leads TROPONIN I Collection Time: 10/27/19  2:44 AM  
Result Value Ref Range Troponin-I, Qt. 8.21 (H) <0.05 ng/mL ASPIRIN TEST Collection Time: 10/27/19  2:44 AM  
Result Value Ref Range Aspirin test 477 ARU  
CBC W/O DIFF Collection Time: 10/27/19  2:44 AM  
Result Value Ref Range WBC 11.7 (H) 4.1 - 11.1 K/uL  
 RBC 3.36 (L) 4.10 - 5.70 M/uL  
 HGB 11.3 (L) 12.1 - 17.0 g/dL HCT 33.9 (L) 36.6 - 50.3 % .9 (H) 80.0 - 99.0 FL  
 MCH 33.6 26.0 - 34.0 PG  
 MCHC 33.3 30.0 - 36.5 g/dL  
 RDW 12.4 11.5 - 14.5 % PLATELET 748 472 - 226 K/uL MPV 10.9 8.9 - 12.9 FL  
 NRBC 0.0 0  WBC ABSOLUTE NRBC 0.00 0.00 - 0.01 K/uL MAGNESIUM Collection Time: 10/27/19  2:44 AM  
Result Value Ref Range Magnesium 2.0 1.6 - 2.4 mg/dL PHOSPHORUS Collection Time: 10/27/19  2:44 AM  
Result Value Ref Range Phosphorus 3.0 2.6 - 4.7 MG/DL  
TROPONIN I Collection Time: 10/27/19  8:30 AM  
Result Value Ref Range Troponin-I, Qt. 12.40 (H) <0.05 ng/mL ECHO ADULT COMPLETE Collection Time: 10/27/19  9:23 AM  
Result Value Ref Range LA Volume 72.57 18 - 58 mL  
 LV E' Lateral Velocity 8.77 cm/s LV E' Septal Velocity 4.96 cm/s Tapse 1.73 1.5 - 2.0 cm Ao Root D 3.36 cm Aortic Valve Systolic Peak Velocity 133.31 cm/s AoV VTI 45.86 cm Aortic Valve Area by Continuity of Peak Velocity 1.7 cm2 Aortic Valve Area by Continuity of VTI 1.7 cm2 AoV PG 12.4 mmHg LVIDd 4.77 4.2 - 5.9 cm  
 LVPWd 0.84 0.6 - 1.0 cm LVIDs 3.23 cm IVSd 0.79 0.6 - 1.0 cm  
 LVOT d 2.12 cm  
 LVOT Peak Velocity 86.05 cm/s LVOT Peak Gradient 3.0 mmHg LVOT VTI 22.26 cm  
 MVA (PHT) 3.5 cm2  
 MV A Negrito 139.79 cm/s  
 MV E Negrito 106.66 cm/s  
 MV E/A 0.76 Left Atrium to Aortic Root Ratio 1.06   
 RVIDd 2.37 cm Aortic Valve Systolic Mean Gradient 7.2 mmHg LA Vol 4C 69.27 (A) 18 - 58 mL  
 LA Vol 2C 55.93 18 - 58 mL  
 LA Area 4C 21.4 cm2 LV Mass .9 88 - 224 g LV Mass AL Index 91.2 49 - 115 g/m2 E/E' lateral 12.16   
 E/E' septal 21.50 E/E' ratio (averaged) 16.83 Mitral Valve E Wave Deceleration Time 216.1 ms  
 Mitral Valve Pressure Half-time 62.7 ms Left Atrium Major Axis 3.55 cm Triscuspid Valve Regurgitation Peak Gradient 34.0 mmHg Aortic Regurgitant Pressure Half-time 512.2 cm Pulmonic Valve Max Velocity 116.83 cm/s LVOT SV 78.6 ml  
 TR Max Velocity 291.69 cm/s  
 LA Vol Index 45.35 16 - 28 ml/m2 LA Vol Index 34.95 16 - 28 ml/m2 LA Vol Index 43.29 16 - 28 ml/m2 AV Cusp 1.64 cm  
 AR Max Negrito 363.66 cm/s Left Ventricular Fractional Shortening by 2D 38.982979781 % Left Ventricular Outflow Tract Mean Gradient 1.6598870350957 mmHg Left Ventricular Outflow Tract Mean Velocity 7.36855666447151 cm/s Mitral Valve Deceleration [place] 6.485427314243 AV Velocity Ratio 0.49   
 AV VTI Ratio 0.5 Aortic valve mean velocity 1.64019390192 m/s AV peak gradient 52.255750774 mmHg PV peak gradient 5.5 mmHg CHRONIC MEDICAL DIAGNOSES: 
Problem List as of 10/27/2019 Date Reviewed: 10/8/2019 Codes Class Noted - Resolved * (Principal) Cerebrovascular accident (CVA) ([facility] ICD-10-CM: I63.9 ICD-9-CM: 434.91  10/25/2019 - Present Acute encephalopathy ICD-10-CM: G93.40 ICD-9-CM: 348.30  9/29/2018 - Present Atrial fibrillation with RVR (HCC) ICD-10-CM: I48.91 
ICD-9-CM: 427.31  9/29/2018 - Present Alzheimer's type dementia with late onset without behavioral disturbance ([facility] ICD-10-CM: G30.1, F02.80 ICD-9-CM: 331.0, 294.10  6/12/2017 - Present Complex partial seizures with consciousness impaired ([facility] ICD-10-CM: G89.720 ICD-9-CM: 345.40  12/21/2015 - Present Elevated troponin ICD-10-CM: R79.89 ICD-9-CM: 790.6  7/29/2014 - Present CAD (coronary artery disease) ICD-10-CM: I25.10 ICD-9-CM: 414.00  7/28/2014 - Present HTN (hypertension) ICD-10-CM: I10 
ICD-9-CM: 401.9  7/28/2014 - Present Hyperlipidemia ICD-10-CM: E78.5 ICD-9-CM: 272.4  7/28/2014 - Present RESOLVED: Seizures ([facility] ICD-10-CM: R56.9 ICD-9-CM: 780.39  7/28/2014 - 12/21/2015 RESOLVED: SEIZURE DISORDER, COMPLEX PARTIAL ICD-9-CM: 345.40  5/24/2011 - 12/21/2015 Greater than 45 minutes were spent with the patient on counseling and coordination of care Signed: Taylor Montejo MD 
10/27/2019 
6:39 PM

## 2019-10-27 NOTE — PROGRESS NOTES
Hospitalist Progress Note Ricardo Trinidad MD 
Answering service: [phone] OR [phone] from in house phone Date of Service:  10/27/2019 NAME:  Latasha Jordan Sr 
:  1927 MRN:  [id number] Admission Summary:  
 
Moisés Welch is a 80-year-old male who is independent in the activities of daily life at baseline, and lives at home with his daughter. Patient does have a history of seizures, and these episodes are characterized by blank stares and not responding to the environment around him. Patients daughter states that patient never had convulsions or shaking. Patient had such an episode yesterday afternoon. Patients daughter states that however, this is the first seizure-like episode in a long while. He usually does not have seizures more than once every year or every few years. Patients daughter states that patient became responsive after arrival to the emergency department and head CT was negative, and he was discharged home. Once he got home that evening, patient  became unresponsive again. Patients daughter called her son who is their next-door neighbor. He came over. Initially, they thought that he was having another seizure. But then it appeared that he seem to want to communicate, but could not. They asked him questions, and he got it yes and no. They asked him if he needed to go to the bathroom, and he answered yes. However, he appeared not to be able to move his arms and legs. They didnt notice that one side was weaker than the other. 
  
Upon arrival to the emergency department at [facility], patient continued to be aphasic. 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 Patient was then transferred to [facility] where he underwent left MCA distal M2 mechanical thrombectomy.  
 
Interval history / Subjective:  
 
Patient is lethargic but open his eyes to voice, non verbal 
 Wife and daughter at bedside, had long discussion about poor prognosis, family don't want aggressive care, leaning to hospice/comfort care and agreed to involve hospice care team  
 
Assessment & Plan:  
 
Aphasic due to acute [facility] M2 occlusion 
-s/p thrombectomy left MCA distal M2 
-pr cardene gtt to keep SBP <160 
-on aspirin 300 mg rectal, keppra 
-MRA of brain on 10/26 moderate to large area of infarction most confluent in the left frontal lobe extending into the left corona radiata with areas of left confluent infarction identified in the left parietal lobe. -continue neuro check 
-speech consulted 
-palliative care team consult 
-Neurointerventional surgeon and neurologist on boar Paroxysmal A Fib, rat controlled 
-on prn cardizem gtt and aspirin 300 mg suppository  
-continue cardiac monitoring Seizure -EEG 10/25 mildly abnormal EEG due to mild slowing of the background rhythms. 
-on keppra  
-seizure precaution 
-neurologist on board HTN 
-BP stable, on cardene gtt, coreg, monitor BP Hx of CAD 
-on coreg, aspirin suppositor Anemia of chronic disease  
-stable, monitor H/H Mild hypokalemia 
-resolved Hx of hypercholesterolemia  
-will resume statin possible vis NGT Moderate Protein-Calorie Malnutrition 
-nutrition consult DNR: poor prognosis, consult to hospice care team for possible comfort care Code status: Full Code DVT prophylaxis: SCD Care Plan discussed with: Patient/Family and Nurse Disposition: TBD Hospital Problems  Date Reviewed: 10/8/2019 Codes Class Noted POA * (Principal) Cerebrovascular accident (CVA) ([facility] ICD-10-CM: I63.9 ICD-9-CM: 434.91  10/25/2019 Unknown Vital Signs:  
 Last 24hrs VS reviewed since prior progress note. Most recent are: 
Visit Vitals /51 Pulse 70 Temp 98.1 °F (36.7 °C) Resp 23 Ht 5' 8\" (1.727 m) Wt 51 kg (112 lb 7 oz) SpO2 97% BMI 17.10 kg/m² Intake/Output Summary (Last 24 hours) at 10/27/2019 1227 Last data filed at 10/27/2019 1200 Gross per 24 hour Intake 2050.96 ml Output 945 ml Net 1105.96 ml Physical Examination:  
 
 
     
Constitutional:  No acute distress, cooperative, pleasant ENT:  Oral mucous moist, oropharynx benign. Resp:  CTA bilaterally. No wheezing/rhonchi/rales. No accessory muscle use CV:  Irregular rhythm, normal rate, no murmurs, gallops, rubs GI:  Soft, non distended, non tender. normoactive bowel sounds, no hepatosplenomegaly Musculoskeletal:  No edema Neurologic:  Lethargic but wakeful, non verbal  
  Skin:  Good turgor, no rashes or ulcers Data Review:  
 Review and/or order of clinical lab test 
Review and/or order of tests in the radiology section of CPT Review and/or order of tests in the medicine section of CPT Labs:  
 
Recent Labs 10/27/19 
0244 10/25/19 
0101 WBC 11.7* 7.3 HGB 11.3* 11.6* HCT 33.9* 34.9*  
 163 Recent Labs 10/27/19 
0244 10/26/19 
0758 10/25/19 
0101 10/24/19 
1415 NA  --  140 135* 136 K  --  3.6 3.7 4.1 CL  --  109* 101 104 CO2  --  20* 27 27 BUN  --  24* 25* 27* CREA  --  0.82 1.16 1.05  
GLU  --  96 105* 93  
CA  --  8.7 8.7 8.5 MG 2.0  --   --   --   
PHOS 3.0  --   --   --   
 
Recent Labs 10/25/19 
0101 10/24/19 
1415 SGOT 12* 16 ALT 16 20 AP 97 103 TBILI 0.6 0.3 TP 6.8 7.3 ALB 3.4* 3.5 GLOB 3.4 3.8 Recent Labs 10/25/19 
0101 INR 1.0 PTP 10.4 APTT 25.4 No results for input(s): FE, TIBC, PSAT, FERR in the last 72 hours. No results found for: FOL, RBCF No results for input(s): PH, PCO2, PO2 in the last 72 hours. Recent Labs 10/27/19 
0830 10/27/19 
0244 10/26/19 
9617 TROIQ 12.40* 8.21* 2.10* Lab Results Component Value Date/Time  Cholesterol, total 127 10/26/2019 04:50 AM  
 HDL Cholesterol 54 10/26/2019 04:50 AM  
 LDL, calculated 64.2 10/26/2019 04:50 AM  
 Triglyceride 44 10/26/2019 04:50 AM  
 CHOL/HDL Ratio 2.4 10/26/2019 04:50 AM  
 
Lab Results Component Value Date/Time Glucose (POC) 117 (H) 10/25/2019 01:39 AM  
 Glucose (POC) 102 (H) 10/24/2019 02:00 PM  
 Glucose (POC) 149 (H) 09/29/2018 06:39 AM  
 Glucose (POC) 102 10/02/2011 09:22 PM  
 Glucose (POC) 138 (H) 10/02/2011 04:30 PM  
 
Lab Results Component Value Date/Time Color YELLOW/STRAW 10/24/2019 03:20 PM  
 Appearance CLEAR 10/24/2019 03:20 PM  
 Specific gravity 1.013 10/24/2019 03:20 PM  
 pH (UA) 7.0 10/24/2019 03:20 PM  
 Protein NEGATIVE  10/24/2019 03:20 PM  
 Glucose NEGATIVE  10/24/2019 03:20 PM  
 Ketone NEGATIVE  10/24/2019 03:20 PM  
 Bilirubin NEGATIVE  10/24/2019 03:20 PM  
 Urobilinogen 0.2 10/24/2019 03:20 PM  
 Nitrites NEGATIVE  10/24/2019 03:20 PM  
 Leukocyte Esterase NEGATIVE  10/24/2019 03:20 PM  
 Epithelial cells FEW 10/24/2019 03:20 PM  
 Bacteria NEGATIVE  10/24/2019 03:20 PM  
 WBC 0-4 10/24/2019 03:20 PM  
 RBC 0-5 10/24/2019 03:20 PM  
 
 
 
Medications Reviewed:  
 
Current Facility-Administered Medications Medication Dose Route Frequency  dilTIAZem (CARDIZEM) 125 mg in dextrose 5% 125 mL infusion  0-15 mg/hr IntraVENous TITRATE  metoprolol (LOPRESSOR) injection 5 mg  5 mg IntraVENous Q1H PRN  
 hydrALAZINE (APRESOLINE) 20 mg/mL injection 20 mg  20 mg IntraVENous Q6H PRN  
 aspirin (ASA) suppository 300 mg  300 mg Rectal DAILY  sodium chloride (NS) flush 5-40 mL  5-40 mL IntraVENous Q8H  
 sodium chloride (NS) flush 5-40 mL  5-40 mL IntraVENous PRN  
 acetaminophen (TYLENOL) tablet 650 mg  650 mg Oral Q4H PRN  
 ondansetron (ZOFRAN) injection 4 mg  4 mg IntraVENous Q4H PRN  
 lactated Ringers infusion  75 mL/hr IntraVENous CONTINUOUS  
 carvedilol (COREG) tablet 3.125 mg  3.125 mg Oral BID WITH MEALS  
 levETIRAcetam (KEPPRA) 1,500 mg in 0.9% sodium chloride 100 mL IVPB  1,500 mg IntraVENous Q12H  pantoprazole (PROTONIX) 40 mg in sodium chloride 0.9% 10 mL injection  40 mg IntraVENous DAILY  
 
______________________________________________________________________ EXPECTED LENGTH OF STAY: 4d 7h 
ACTUAL LENGTH OF STAY:          2 Tari Rebolledo MD

## 2019-10-27 NOTE — HOSPICE
[facility] RN consultation visit note. Order for hospice noted. History and events of this hospitalization reviewed. Met with patient, daughter and her  Shabnam Shahid and Kyle Zazueta, grandchildren Harsh Medellin and Aliyaha. [address] given at this difficult time. Hospice philosophy and scope of services presented. Questions and concerns addressed. Patient is unresponsive, skin very warm and dry, color sallow, lungs with coarse rhonchi audible throughout lung fields, irritating non productive cough, cough effort not enough to clear congestion, hands and feet cool with trunk warm. RR 34 and assessed to be in respiratory distress. Family would like for hospice to be a part of patient's care. Dr. Prudencio Hernandez advised of the above  Order received to admit to inpatient hospice with GIP LOC for uncontrolled symptoms listed above. Also There is a high likelihood of sudden decline Dx   Acute CVA. Jayashree Navarro Daughter Janey Johns signed Medicare hospice forms. Dr. Elizabeth Recio advised of above and deferred to Dr. Ida Macias to be hospice attending MD. Please call [phone] if questions or concerns Pavithra Conway, LATONYA [facility]

## 2019-10-27 NOTE — CONSULTS
Neurology Progress Note Patient ID: 
Stephanie Mcpherson 
[id number] 
2 y.o. 
2/27/1927 Chief Complaint: Stroke Subjective:  
 
80year-old gentleman admitted for stroke due to left MCA occlusion status post successful thrombectomy. MRI was done showing large territory left MCA infarct. This morning he remains a phasic but is more alert with awareness. Cardiology on board considering anticoagulation for elevated troponins. Objective:  
 
 
ROS: 
Cannot obtain secondary to patient medical condition Meds: 
Current Facility-Administered Medications Medication Dose Route Frequency  dilTIAZem (CARDIZEM) 125 mg in dextrose 5% 125 mL infusion  0-15 mg/hr IntraVENous TITRATE  metoprolol (LOPRESSOR) injection 5 mg  5 mg IntraVENous Q1H PRN  
 aspirin (ASA) suppository 300 mg  300 mg Rectal DAILY  sodium chloride (NS) flush 5-40 mL  5-40 mL IntraVENous Q8H  
 sodium chloride (NS) flush 5-40 mL  5-40 mL IntraVENous PRN  
 acetaminophen (TYLENOL) tablet 650 mg  650 mg Oral Q4H PRN  
 ondansetron (ZOFRAN) injection 4 mg  4 mg IntraVENous Q4H PRN  
 lactated Ringers infusion  75 mL/hr IntraVENous CONTINUOUS  
 carvedilol (COREG) tablet 3.125 mg  3.125 mg Oral BID WITH MEALS  
 levETIRAcetam (KEPPRA) 1,500 mg in 0.9% sodium chloride 100 mL IVPB  1,500 mg IntraVENous Q12H  hydrALAZINE (APRESOLINE) 20 mg/mL injection 20 mg  20 mg IntraVENous Q6H PRN  pantoprazole (PROTONIX) 40 mg in sodium chloride 0.9% 10 mL injection  40 mg IntraVENous DAILY MRI Results (maximum last 3): Results from Hospital Encounter encounter on 10/25/19 MRA BRAIN WO CONT Narrative CLINICAL HISTORY: Left MCA infarction, status post embolectomy M2 division 
posterior branch INDICATION: Status post embolectomy with left MCA infarction. COMPARISON:  CT head 10/25/2019, CTA 10/25/2019 TECHNIQUE: MR examination of the brain includes axial and sagittal T1, axial T2, 
 axial FLAIR, axial gradient echo, axial DWI, coronal T2. Coronal T2 Next, 3-D time-of-flight MRA of the brain was performed. Multiplanar 
reconstructions were obtained. FINDINGS:  
There is no intracranial mass, hemorrhage or evidence of acute infarction. There is a moderate to large area of cortical and subcortical infarction in the 
left frontal lobe. Confluent at the level of the insular cortex and left centrum 
semiovale. There is no associated hemorrhage, midline shift or mass effect. Minimal less confluent infarction in the anterior left parietal lobe. Punctate 
focus of infarction in the inferior right cerebellum. Punctate infarction in the 
left occipital lobe. There is no Chiari or syrinx. The pituitary and infundibulum are grossly 
unremarkable. There is no skull base mass. Cerebellopontine angles are grossly 
unremarkable. The major intracranial vascular flow-voids are unremarkable. The 
cavernous sinuses are symmetric. Optic chiasm and infundibulum grossly 
unremarkable. Orbits are grossly symmetric. Dural venous sinuses are grossly 
patent. The brain architecture is normal. 
 
A 2 segments are patent. There are A1 segments bilaterally. The petrous and 
cavernous ICAs are patent. There is mild atherosclerotic change of the cavernous ICAs. M1 segments are patent on the right and on the left. M2 segments are 
patent on the right and on the left. Posterior to indicating artery on the 
right. P1 segments demonstrate mild multifocal stenoses. Proximal P2 segments 
are patent. The vertebral artery on the left is dominant. Impression IMPRESSION: 
 
Moderate to large area of infarction most confluent in the left frontal lobe 
extending into the left corona radiata with areas of left confluent infarction 
identified in the left parietal lobe. Punctate foci of infarction right cerebellum and left occipital lobe. No associated hemorrhage, midline shift or mass effect. No extra-axial 
collection. No evidence of recurrent M2 vessel occlusion on the left. Probable minimal stenosis/vasospasm at the origin of the posterior division of 
the M2 segment There is no new major vessel occlusion. MRI BRAIN WO CONT Narrative CLINICAL HISTORY: Left MCA infarction, status post embolectomy M2 division 
posterior branch INDICATION: Status post embolectomy with left MCA infarction. COMPARISON:  CT head 10/25/2019, CTA 10/25/2019 TECHNIQUE: MR examination of the brain includes axial and sagittal T1, axial T2, 
axial FLAIR, axial gradient echo, axial DWI, coronal T2. Coronal T2 Next, 3-D time-of-flight MRA of the brain was performed. Multiplanar 
reconstructions were obtained. FINDINGS:  
There is no intracranial mass, hemorrhage or evidence of acute infarction. There is a moderate to large area of cortical and subcortical infarction in the 
left frontal lobe. Confluent at the level of the insular cortex and left centrum 
semiovale. There is no associated hemorrhage, midline shift or mass effect. Minimal less confluent infarction in the anterior left parietal lobe. Punctate 
focus of infarction in the inferior right cerebellum. Punctate infarction in the 
left occipital lobe. There is no Chiari or syrinx. The pituitary and infundibulum are grossly 
unremarkable. There is no skull base mass. Cerebellopontine angles are grossly 
unremarkable. The major intracranial vascular flow-voids are unremarkable. The 
cavernous sinuses are symmetric. Optic chiasm and infundibulum grossly 
unremarkable. Orbits are grossly symmetric. Dural venous sinuses are grossly 
patent. The brain architecture is normal. 
 
A 2 segments are patent. There are A1 segments bilaterally. The petrous and 
cavernous ICAs are patent. There is mild atherosclerotic change of the cavernous ICAs. M1 segments are patent on the right and on the left.  M2 segments are 
 patent on the right and on the left. Posterior to indicating artery on the 
right. P1 segments demonstrate mild multifocal stenoses. Proximal P2 segments 
are patent. The vertebral artery on the left is dominant. Impression IMPRESSION: 
 
Moderate to large area of infarction most confluent in the left frontal lobe 
extending into the left corona radiata with areas of left confluent infarction 
identified in the left parietal lobe. Punctate foci of infarction right cerebellum and left occipital lobe. No associated hemorrhage, midline shift or mass effect. No extra-axial 
collection. No evidence of recurrent M2 vessel occlusion on the left. Probable minimal stenosis/vasospasm at the origin of the posterior division of 
the M2 segment There is no new major vessel occlusion. Results from [facility] Encounter encounter on 02/15/11 MRI BRAIN W AND W/O CONTRAST Narrative **Final Report** 
  
 
ICD Codes / Adm. Diagnosis: 780.97  345.40 / ALTERED MENTAL STATUS  PART EPIL  
W IMP CONSC W/O INTR Examination:  MR BRAIN W AND Maile Kilgore  - [id number] - Feb 15 2011  2:33PM 
Accession No:  [id number] Reason:  ALTERED MENTAL STATUS, EPILEPSY REPORT: 
INDICATION: Altered mental status, memory loss, seizures 780.97, 345.4, 345.1 COMPARISON: MRI 01/18/2010 EXAM: Sagittal T1-weighted spin-echo, axial FLAIR, axial and coronal  
T2-weighted fast spin-echo, axial diffusion weighted echo planar, axial  
T1-weighted spin-echo, axial gradient echo, and post IV contrast-enhanced  
axial, sagittal and coronal T1-weighted spin-echo MR images of the brain are  
obtained. A total of 13 cc intravenous Magnevist was administered for the  
study. FINDINGS: Mild prominence the ventricles and cortical sulci consistent with  
atrophy is again shown. Several small nonspecific foci of white matter  
signal change in the cerebrum bilaterally are again noted and unchanged. There is no acute infarction. No intra-axial or extra-axial mass or  
enhancement abnormality is shown. The vascular flow-voids at the base of the  
brain remain normal in conspicuity. Sella, optic chiasm, posterior fossa and  
orbits remain normal. Moderate mucosal thickening of the left maxillary  
sinus is shown, increased from previous exam. 
   
 
IMPRESSION: Mild atrophy and evidence for foci of chronic small vessel  
ischemic disease of the white matter. No evidence for acute infarction, mass  
or enhancement abnormality, or substantial change. Interpreting/Reading Doctor: Cele Lim. Shimon Cote ([id number]) Transcribed:  on 02/15/2011 Approved: SIL Cote ([id number])  02/15/2011 Distribution:  Attending Doctor: Janes Schmitz Lab Review Recent Results (from the past 24 hour(s)) EKG, 12 LEAD, INITIAL Collection Time: 10/26/19  7:17 PM  
Result Value Ref Range Ventricular Rate 89 BPM  
 Atrial Rate 89 BPM  
 P-R Interval 236 ms QRS Duration 92 ms Q-T Interval 340 ms QTC Calculation (Bezet) 413 ms Calculated P Axis 67 degrees Calculated R Axis 14 degrees Calculated T Axis -162 degrees Diagnosis Sinus rhythm with sinus arrhythmia with 1st degree AV block Septal infarct (cited on or before 26-OCT-2019) Marked ST abnormality, possible anterolateral subendocardial injury When compared with ECG of 24-OCT-2019 14:16, 
Vent. rate has increased BY  30 BPM 
ST now depressed in Anterolateral leads T wave inversion now evident in Inferior leads T wave inversion now evident in Anterolateral leads TROPONIN I Collection Time: 10/27/19  2:44 AM  
Result Value Ref Range Troponin-I, Qt. 8.21 (H) <0.05 ng/mL ASPIRIN TEST Collection Time: 10/27/19  2:44 AM  
Result Value Ref Range Aspirin test 477 ARU  
CBC W/O DIFF Collection Time: 10/27/19  2:44 AM  
Result Value Ref Range WBC 11.7 (H) 4.1 - 11.1 K/uL RBC 3.36 (L) 4.10 - 5.70 M/uL  
 HGB 11.3 (L) 12.1 - 17.0 g/dL HCT 33.9 (L) 36.6 - 50.3 % .9 (H) 80.0 - 99.0 FL  
 MCH 33.6 26.0 - 34.0 PG  
 MCHC 33.3 30.0 - 36.5 g/dL  
 RDW 12.4 11.5 - 14.5 % PLATELET 640 413 - 083 K/uL MPV 10.9 8.9 - 12.9 FL  
 NRBC 0.0 0  WBC ABSOLUTE NRBC 0.00 0.00 - 0.01 K/uL MAGNESIUM Collection Time: 10/27/19  2:44 AM  
Result Value Ref Range Magnesium 2.0 1.6 - 2.4 mg/dL PHOSPHORUS Collection Time: 10/27/19  2:44 AM  
Result Value Ref Range Phosphorus 3.0 2.6 - 4.7 MG/DL  
TROPONIN I Collection Time: 10/27/19  8:30 AM  
Result Value Ref Range Troponin-I, Qt. 12.40 (H) <0.05 ng/mL ECHO ADULT COMPLETE Collection Time: 10/27/19  9:23 AM  
Result Value Ref Range LA Volume 72.57 18 - 58 mL  
 LV E' Lateral Velocity 8.77 cm/s LV E' Septal Velocity 4.96 cm/s Tapse 1.73 1.5 - 2.0 cm Ao Root D 3.36 cm Aortic Valve Systolic Peak Velocity 809.14 cm/s AoV VTI 45.86 cm Aortic Valve Area by Continuity of Peak Velocity 1.7 cm2 Aortic Valve Area by Continuity of VTI 1.7 cm2 AoV PG 12.4 mmHg LVIDd 4.77 4.2 - 5.9 cm  
 LVPWd 0.84 0.6 - 1.0 cm LVIDs 3.23 cm IVSd 0.79 0.6 - 1.0 cm  
 LVOT d 2.12 cm  
 LVOT Peak Velocity 86.05 cm/s LVOT Peak Gradient 3.0 mmHg LVOT VTI 22.26 cm  
 MVA (PHT) 3.5 cm2  
 MV A Negrito 139.79 cm/s  
 MV E Negrito 106.66 cm/s  
 MV E/A 0.80 Left Atrium to Aortic Root Ratio 1.06   
 RVIDd 2.37 cm Aortic Valve Systolic Mean Gradient 7.2 mmHg LA Vol 4C 69.27 (A) 18 - 58 mL  
 LA Vol 2C 55.93 18 - 58 mL  
 LA Area 4C 21.4 cm2 LV Mass .9 88 - 224 g LV Mass AL Index 91.2 49 - 115 g/m2 E/E' lateral 12.16   
 E/E' septal 21.50 E/E' ratio (averaged) 16.83 Mitral Valve E Wave Deceleration Time 216.1 ms  
 Mitral Valve Pressure Half-time 62.7 ms Left Atrium Major Axis 3.55 cm Triscuspid Valve Regurgitation Peak Gradient 34.0 mmHg Aortic Regurgitant Pressure Half-time 512.2 cm Pulmonic Valve Max Velocity 116.83 cm/s LVOT SV 78.6 ml  
 TR Max Velocity 291.69 cm/s  
 LA Vol Index 45.35 16 - 28 ml/m2 LA Vol Index 34.95 16 - 28 ml/m2 LA Vol Index 43.29 16 - 28 ml/m2 AV Cusp 1.64 cm  
 AR Max Negrito 363.66 cm/s Left Ventricular Fractional Shortening by 2D 17.872569885 % Left Ventricular Outflow Tract Mean Gradient 1.5382510030763 mmHg Left Ventricular Outflow Tract Mean Velocity 0.16644074545167 cm/s Mitral Valve Deceleration Parker 3.998118993648 AV Velocity Ratio 0.49   
 AV VTI Ratio 0.5 Aortic valve mean velocity 5.43825249535 m/s AV peak gradient 52.569554758 mmHg PV peak gradient 5.5 mmHg Additional comments:I reviewed the patients new imaging test results. Patient Vitals for the past 8 hrs: 
 BP Temp Pulse Resp SpO2 Height Weight 10/27/19 0923 139/55     5' 8\" (1.727 m) 51 kg (112 lb 7 oz) 10/27/19 0900 139/55  (!) 54 18 98 %    
10/27/19 0800 125/55 98.1 °F (36.7 °C) (!) 58 28 97 %    
10/27/19 0700 145/66  70 24 98 %    
10/27/19 0615 135/80  73 25 99 %    
10/27/19 0500 (!) 128/113  70 (!) 31 97 %    
10/27/19 0400 137/73 97 °F (36.1 °C) (!) 57 19 99 %    
10/27/19 0300 (!) 130/92  74 28 100 %    
 
 
10/27 0701 - 10/27 1900 In: -  
Out: 45 [Urine:45] 10/25 1901 - 10/27 0700 In: 2882.7 [I.V.:2882.7] Out: 1420 [VGAJS:1721] Exam: 
Visit Vitals /55 Pulse (!) 54 Temp 98.1 °F (36.7 °C) Resp 18 Ht 5' 8\" (1.727 m) Wt 51 kg (112 lb 7 oz) SpO2 98% BMI 17.10 kg/m² Gen: Pale elderly gentleman, well developed CV: RRR Lungs: non labored breathing Abd: soft, non distended Neuro: He wakes to touch and voice. He can nod his head to simple questions but inconsistently. He cannot follow commands consistently. CN II-XII: PERRL, left gaze preference, face asymmetric Motor: Minimal movement throughout Sensory: Unreliable testing Gait: Deferred PROBLEM LIST:  
 
Patient Active Problem List  
Diagnosis Code  CAD (coronary artery disease) I25.10  
 HTN (hypertension) I10  
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  
 Cerebrovascular accident (CVA) ([facility] [address] I63.9 Assessment/Plan:   
 
26-year-old gentleman with a large territory left hemispheric infarct. Unfortunately care is supportive at this time. He is high risk to convert into hemorrhage. I would not recommend anticoagulation because of that risk. I discussed this with the granddaughter as well as that could be life-threatening and/or life ending if you were to convert. I also discussed with her that given his age and degree of stroke that I do not expect a good meaningful recovery as far as language and strength recovering. Anticipate he would need some type of feeding tube for support. Would consider a palliative consult and comfort care. She is going to discuss with the family. I will follow peripherally. Please call if needed. This clinical note was dictated with an electronic dictation software that can make unintentional errors. If there are any questions, please contact me directly for clarification.  
 
 
Signed: 
Caterina James DO 
10/27/2019 
10:01 AM

## 2019-10-27 NOTE — PROGRESS NOTES
Neurocritical Care Overnight Events: 
 
ST depression, tropinemia At shift change Francis Stairs RN reported EKG changes and labile HR, although in sinus rhythm EKG shows new ST depression in the anterolateral leads since EKG on 10/24 Trop was ordered by hospitalist and was 2.1 Already on ASA for stroke Already on dilt gtt for rate control. Recommended nurses titrate gtt to maintain HR 50-70 as BP tolerates And use the prn metoprolol IV prn as well BP has not been an issue. Cards consult placed **From a neurological standpoint, the stroke is too large for anticoagulation this soon after infarct due to risk of hemorrhagic conversion. Elliot Steven RN spoke w/ santos. Since anticoagulation contraindicated at this time, Cards said he would benefit from DAPT which is reasonable from risk of hemorrhagic conversion. Currently no enteral access. He also recommended systolic < 460 which we are doing. Isabelle Singh NP Neurology/NIS/Neurocritical Care Nurse Practitioner [phone]

## 2019-10-27 NOTE — PROGRESS NOTES
[facility] NP Progress note Name: Jaclyn Hughes Sr 
YOB: 1927 MRN: [id number] Admission Date: 10/25/2019  2:46 AM 
 
Date of service: 10/26/2019 19:30 PM 
 
Subjective:  
Possible ST changes on monitor Objective:  
  
Received call from RN that patient appeared to have an ST change on monitor and so RN obtained EKG. EKG showed NSR w/ sinus arrhythmia with 1st degree AV block, septal infarct, marked ST abnormality, possible anterolateral subendocardial injury. When compared with EKG from 10/24, ST now depressed in anterolateral leads, T wave inversion now evident in inferior and anterolateral leads. Per RN, pt aphasic and so unable to assess for CP. Assessment & Plan:  
 
EKG changes 
-Stat troponin and bmp 
 
2300-Reviewing chart and troponin noted to be elevated at 2.10 (resulted at 0758). NeuroCC note states to defer [address] to neurology. Spoke to [facility]. Will go ahead and place cardiology consult (routine, but call now for heparin gtt recs) and serial troponins. RN also instructed to call neurology w/ troponin finding and recs for heparin gtt). Nuria Lange NP 
[phone] or Arias

## 2019-10-27 NOTE — PROGRESS NOTES
Spiritual Care Assessment/Progress Note [address] 
 
 
NAME: Jeni Peguero Sr      MRN: [id number] AGE: 80 y.o. SEX: male Anglican Affiliation: Taoism  
Language: English  
 
10/27/2019     Total Time (in minutes): 5 Spiritual Assessment begun in [facility] 7S1 INTENSIVE CARE through conversation with: 
  
    []Patient        [] Family    [] Friend(s) Reason for Consult: Initial/Spiritual assessment, critical care Spiritual beliefs: (Please include comment if needed) 
   [] Identifies with a nataliya tradition:     
   [] Supported by a nataliya community:        
   [] Claims no spiritual orientation:       
   [] Seeking spiritual identity:            
   [] Adheres to an individual form of spirituality:       
   [x] Not able to assess:                   
 
    
Identified resources for coping:  
   [] Prayer                           
   [] Music                  [] Guided Imagery 
   [] Family/friends                 [] Pet visits [] Devotional reading                         [] Unknown 
   [] Other:                                         
 
 
Interventions offered during this visit: (See comments for more details) Patient Interventions: Initial visit Plan of Care: 
 
 [] Support spiritual and/or cultural needs  
 [] Support AMD and/or advance care planning process    
 [] Support grieving process 
 [] Coordinate Rites and/or Rituals  
 [] Coordination with community clergy [] No spiritual needs identified at this time 
 [] Detailed Plan of Care below (See Comments)  [] Make referral to Music Therapy 
[] Make referral to Pet Therapy    
[] Make referral to Addiction services 
[] Make referral to [facility] 
[] Make referral to Spiritual Care Partner 
[] No future visits requested       
[x] Follow up visits as needed Attempted to visit pt in ICU. Pt sleeping and did not alert. No family present. Chaplains will continue to offer support as needed. Chaplain Sanders MDiv, MS, [address] ([id number])

## 2019-10-27 NOTE — PROGRESS NOTES
2000: Bedside and Verbal shift change report given to Radha Suarez RN (oncoming nurse) by Rubia Ortega RN (offgoing nurse). Report included the following information SBAR, Kardex, Intake/Output, MAR, Accordion, Recent Results, Cardiac Rhythm NSR, afib, 1st degree AVB, ST depression and Alarm Parameters . Pt NSR on monitor with 1st degree AVB and ST depression. EKG obtained by dayshift RN. Troponin and BMP drawn. 2130: Troponin 2.1 result received from lab. Reported result to Christ lucas NP. NP recommended titration of Cardizem gtt to maintain HR 50-70 and give PRN metoprolol. 2300: Call received from hospitalist having noted troponin result of 2.1. Orders received to consult cardiology, obtain serial troponin, and to call neurology with troponin finding and discuss anticoagulation. Per night neuro escalation protocol again discussed troponin and anticoagulation with Christ lucas NP. Recommended no anticoagulation plan at this time due to recent thrombectomy. Placed consult to Cardiology. Spoke with Elsi Henson MD. Will continue to monitor troponins as per neuro not anticoagulation candidate. Received orders to maintain SBP <140 and manage HR per neuro recommendation 0340: Repeat troponin 8.21. Notified Christ lucas NP. Orders received to continue to manage HR and BP per Cardiology and Neuro  recommendation 0730: Bedside and Verbal shift change report given to Rubia Ortega RN (oncoming nurse) by Radha Suarez RN (offgoing nurse). Report included the following information SBAR, Kardex, Intake/Output, MAR, Accordion, Recent Results, Cardiac Rhythm NSR, 1st degree AVB, ST depression and Alarm Parameters .

## 2019-10-27 NOTE — PROGRESS NOTES
0730- Bedside shift change report given to Natasha Jara RN (oncoming nurse) by Anne Eldridge RN (offgoing nurse). Report included the following information SBAR, Kardex, ED Summary, Intake/Output, MAR, Accordion, Recent Results, Med Rec Status, Cardiac Rhythm SR c 1 degree AVB and Alarm Parameters . 1000- Spoke with Dr. Jennifer Cunningham regarding upward trending troponin. Order received to maintain SBP <140 and to continue trending troponins until trending down. 1140- RN attempted to place dobhoff multiple times, unable to advance. After 20min, patient no longer tolerating (RR up and coughing). Dobhoff placement aborted by RN. 12- Dr. Jaycob Oleary at bedside, spoke with family, hospice consult placed. Order received to stop serial troponins. [place] [address] RN at bedside.

## 2019-10-27 NOTE — PROGRESS NOTES
1910- Attempted to call report, RN will call back when available. 1925- TRANSFER - OUT REPORT: 
 
Verbal report given to Ysidro Pallas, RN(name) on [facility]  being transferred to [facility](unit) for routine progression of care Report consisted of patients Situation, Background, Assessment and  
Recommendations(SBAR). Information from the following report(s) SBAR, Kardex, ED Summary, Procedure Summary, Intake/Output, MAR, Accordion, Recent Results, Med Rec Status, Cardiac Rhythm A. Fib and Alarm Parameters  was reviewed with the receiving nurse. Lines:  
Peripheral IV 10/25/19 Right Arm (Active) Site Assessment Clean, dry, & intact 10/27/2019  4:00 PM  
Phlebitis Assessment 0 10/27/2019  4:00 PM  
Infiltration Assessment 0 10/27/2019  4:00 PM  
Dressing Status Clean, dry, & intact 10/27/2019  4:00 PM  
Dressing Type Transparent 10/27/2019  4:00 PM  
Hub Color/Line Status Green; Infusing 10/27/2019  4:00 PM  
Action Taken Open ports on tubing capped 10/27/2019  4:00 PM  
Alcohol Cap Used Yes 10/27/2019  4:00 PM  
   
Peripheral IV 10/27/19 Left Arm (Active) Site Assessment Clean, dry, & intact 10/27/2019  4:00 PM  
Phlebitis Assessment 0 10/27/2019  4:00 PM  
Infiltration Assessment 0 10/27/2019  4:00 PM  
Dressing Status Clean, dry, & intact 10/27/2019  4:00 PM  
Dressing Type Transparent 10/27/2019  4:00 PM  
Hub Color/Line Status Blue; Infusing 10/27/2019  4:00 PM  
Action Taken Open ports on tubing capped 10/27/2019  4:00 PM  
Alcohol Cap Used Yes 10/27/2019  4:00 PM  
  
 
Opportunity for questions and clarification was provided. Patient transported with: 
 [employer]

## 2019-10-27 NOTE — PROGRESS NOTES
TRANSFER - IN REPORT: 
 
Verbal report received from Stephany(chelsea) on [facility] Sr  being received from ICU(unit) for routine progression of care Report consisted of patients Situation, Background, Assessment and  
Recommendations(SBAR). Information from the following report(s) SBAR, Kardex, Procedure Summary, Intake/Output, MAR, Accordion and Recent Results was reviewed with the receiving nurse. Opportunity for questions and clarification was provided. Assessment completed upon patients arrival to unit and care assumed.

## 2019-10-28 NOTE — HOSPICE
[facility] Good Help to Those in Need 
[phone] Social Work Admission Note Patient Name: Connor Chacon 
YOB: 1927 Age: 80 y.o. Date of Visit: 10/28/19 Facility of Care: [facility] Patient Room: [facility]/ Hospice Attending: Lianna Montoya MD 
Hospice Diagnosis: Acute CVA (cerebrovascular accident) ([address] [I63.9] Level of Care:  
 [x]  GIP []  Respite 
 []  Routine NARRATIVE This LCSW met with pt/\"Drew\"  who minimally responsive and appeared agitated, his daughter Scar Aggarwal, her daughter Sudeep Vazquez, and 3 friends for SW assessment. Pt is a 79 y/o CM with a hospice diagnosis of CVA. Pt hs comorbid hx of seizures, CAD, HTN, mild AD Dementia, acute encephalopathy, and a-fib with RVR. Scar Aggarwal reports pt was brought to the ED earlier in the week with what she thought was a CVA, returned home and was later admitted on 10/25 due to CVA. Pt is  2x's. Pt was living with his daughter Scar Aggarwal and her  Wendie Holter. Family reports pt was independent prior to his hospitalization. Family noted pt baked corn muffins for the Zoroastrian dinner just a few days prior to his hospitalization. Family reports pt was born in [place], [place]. 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 The family reports they moved to [place], [place] when pt was helping build [employer]. Family and friends describe pt as kind and loving. Pt enjoyed gardening and rahat vegetables. Andrew Albarado came in the room during my visit. LCSW left the room to get GFMS for family and came back with RN to discuss GIP status unless pt stabilizes. Family reports changes in pts clinical status and decline have been very sudden. LCSW provided emotional support through reflective listening as Terri Pope and friends talked about pts independence and good quality of life prior to CVA. LCSW provided emotional support for the family in coping with pts decline and functional loss due to CVA.   LCSW and family discussed changes in pts clinical status including agitation. LCSW normalized this as part of the progression of dementia as well as part of the EOL process. LCSW shared GFMS to provide education re the EOL process. Low risk for Bereavement for daughter Nayeli King, although they are sad, they are realistic and accepting as pt has had \" a good quality of life\". LCSW will continue to assess and monitor pt and family needs. ADVANCE CARE PLANNING Code Status: DNR Durable DNR: _ Yes  _X No 
Advance Care Planning 10/28/2019 Patient's Healthcare Decision Maker is: -  
Primary Decision Maker Name -  
Primary Decision Maker Phone Number -  
Primary Decision Maker Relationship to Patient -  
Confirm Advance Directive Yes, not on file Relationship Status: 
[]  Single    
[]       
[]     
[]  Domestic Partner    
[x]  / 
[]  Common Law 
[]   
[]  Unknown If in a relationship, name of partner/spouse: 
Duration of relationship: 
 
Denominational: Anabaptism  Home: unknown Resources Provided: GFMS Social Work Initial Assessment Gender: 
male Race/Ethnicity: (martha all that apply) []  American [place] or [place] Native 
[]   
[]  Black or [place] American 
[]   or  
[]   or [place] 
[x]  [place] 
[]  Unknown 
  
 Service:   
[]  Yes  
[]  No      
[x]  Unknown Appropriate for Pinning Ceremony:  
[]  Yes     
[]  No 
Is patient using VA benefits? []  Yes     
[]  No 
  
Primary Language: English  
[]   Needed 
[]   utilized during visit Ability to express thoughts/needs/feelings 
[]  Expressed thoughts/feelings/needs without difficulty 
[]  Requires extra time and cuing 
[]  Speech limited single words 
[]  Uses only gestures (eye, blinking eye or head movement/pointing) []  Unable to express thoughts/feelings/needs (speech unintelligible or inappropriate) [x]  Unresponsive, he is minimally responsive Notes:  
  
Mental Status: 
[]  Alert-oriented to:   
 []  Person   
 []  Place   
 []  Time 
[]  Comatose-responds to:  
 []   Verbal stimuli  
 []  Tactile stimuli  
 []  Painful stimuli 
[]  Forgetful 
[]  Disoriented/Confused 
[]  Lethargic [x]  Agitated 
[x]  Other (specify): Minimally responsive Notes:  
  
Patients description of Illness/Current Health Status:   
[x]  Patient unable to discuss Minimally responsive 
 
[]  Patient unwilling to discuss 
[]  (Specify) Knowledge/Understanding of Disease Process Patient:  
 []  Demonstrates knowledge/understanding of disease process 
 []  Demonstrates knowledge/understanding of treatment plan 
 []  Demonstrates knowledge/understanding of prognosis []  Demonstrates acceptance of prognosis []  Demonstrates knowledge/understanding of resuscitation status [x]  Other (specify)Minimally responsive Caregiver: 
 [x]  Demonstrates knowledge/understanding of disease process [x]  Demonstrates knowledge/understanding of treatment plan 
 [x]  Demonstrates knowledge/understanding of prognosis [x]  Demonstrates acceptance of prognosis [x]  Demonstrates knowledge/understanding of resuscitation status 
 []  Other (specify) Notes:  
  
Patients living arrangement/care setting: 
Use the PRIOR COLUMN when the PATIENTS current health status necessitated a change in his/her primary residence. Prior Current Response [x]             []    Patients own home/residence []             []    Home of family member/friend []             []    Boarding home  
           []             []    Assisted living facility/shelter center []             []    Hospital/Acute care facility []             []    Skilled nursing facility []             []    Long term care facility/Nursing home []             [x]    Hospice in Patient Primary Caregiver: 
[]  No Primary Caregiver Name of Primary Caregiver: Myra Taylor Relationship or Primary Caregiver:  
 []  Spouse/Significant other     
 [x]  Natural Child      
 []  Step child     
 []  Sibling 
 []  Parent 
 []  Friend/Neighbor 
 []  Community/[employer] Volunteer 
 []  Paid help 
 []  Other (specify):___________ Notes:   
  
Family members/Significant others: 
Name: Myra Taylor Relationship: daughter Phone Number: [phone] Actively involved in care? [x]  Yes  []  No 
 
Name: Wendy King Relationship: granddaughter Phone Number: Actively involved in care? [x]  Yes  []  No 
 
Name: Gabrielle Albrecht Relationship: CECILIO Phone Number: Actively involved in care? [x]  Yes  []  No 
 
Social support systems: (select ONE best description) [x]  Excellent social support system which includes three or more family members or friends 
[]  Good social support system which includes two or less members or friends 
[]  [address] support which includes one family member or friend 
[]  Poor social support; no family members or friends; basically ALONE Notes:  
  
Emotional Status: (martha all that apply) Patient Caregiver Response  
              []                [x]    Mood/Affect stable and appropriate    
              []                []    Angry  
              [x]                []    Anxious []                []    Apprehensive []                []    Avoidant  
              []                []    Clinging  
              []                []    Depressed  
              []                []    Distraught  
              []                []    Elated []                []    Euphoric  
              []                []    Fearful  
              []                []    Flat Affect  
              []                []    Helpless []                []    Hostile []                []    Impulsive []                []    Irritable  
              []                []    Labile  
              []                []    Manic  
              []                []    Restlessness []                [x]    Sad  
              []                []    Suspicious []                []    Tearful  
              []                []    Withdrawn Notes:  
 
Coping Skills (strengths/weakness):  
 Patient: Coping Skills (strength/weakness): Minimally responsive Family/caregiver (strength/weakness): Well supported by each other and their nataliya community/ sudden CVA, pt was independent prior to hospitalization, family is processing suddenness of CVA and decline.   
  
American Falls of care (martha all that apply):    
[]  No burden evident  
[]  Family must administer medications  
[]  Illness causing financial strain  
[x]  Family/Support feels overwhelmed  
[]  Family/Support sleep disturbed with patients care  
[]  Patients care causes extra physical stress  of death 
[]  Illness causes changes in family lifestyle 
[]  Illness impacting family/support employment 
[]  Family experiencing increased time demands 
[]  Patients behavior endangers family 
[]  Denial of patients illness 
[]  Concern over outcome of illness/fear 
[]  Patients behavior embarrassing to family Notes:  
  
Risk Factors: (martha all that apply):   
[x]  No burden evident  
[]  Alcohol abuse 
[]  Financial resources inadequate to meet basic needs (food/house/etc) []  Financial resources inadequate to meet health care needs (supplies/equipment/medications) 
[]  Food/nutrition resources inadequate 
[]  Home environment unsafe/inadequate for home care 
[]  Homicidal risk 
[]  Lives alone or without concerned relatives 
[]  Multiple medications/complex schedule []  Physical limitations increase likelihood of falls 
[]  Plan of care/treatments complicated 
[]  Substance use/abuse 
[]  Suicidal risk 
[]  Visual impairment threatens safety/ability to perform self-care 
[]  Other (specify): 
  
Abuse/Neglect (actual/potential risks): 
[x]  No signs of abuse/neglect 
[]  History of abuse/neglect                 []  INQJEMZO          []  Sexual 
[]  History of domestic violence 
[]  Lacks adequate physical care 
[]  Lacks emotional nurturing/support 
[]  Lacks appropriate stimulation/cognitive experiences 
[]  Left alone inappropriately 
[]  Lacks necessary supervision 
[]  Inadequate or delayed medical care 
[]  Unsafe environment (i.e guns/drug use/history of violence in the home/etc.) []  Bruising or other physical signs of injury present 
[]  Other (specify): 
Notes:  
[]  Refer to child/adult protective services Current Sources of Stress (in Addition to Current Illness):  
[x]  None reported 
[]  Bills/Debt   
[]  Career/Job change   
[]   (short term)   
[]   (long term)   
[]  Death of a child (recent)   
[]  Death of a parent (recent)  
[]  Death of a spouse (recent)  
[]  Employment status changed  
[]  Family discord   
[]  Financial loss/Inadequate inther (specify):come 
[]  Job loss 
[]  Legal issues unresolved 
[]  Lifestyle change 
[]  Marital discord 
[]  Marriage within the last year 
[]  Paperwork (insurance/legal/etc) overwhelming 
[]  Separation/Divorce 
[]  Other (specify): 
Notes:  
  
Current Community Resources Being Utilized 1. Interventions/Plan of Care 1. Assess social and emotional factors related to coping with end of life issues 2. Community resource planning/referral  
3. Relocation to different care setting if/when symptoms stabilize 4. Discharge Planning 1. May pass at [facility], could return to daughter's home if disposition is needed. MSW Assessment Completed by: Sree Zambrano 10/28/19 Time In:  1:00 pm     
Time Out:3:00 pm

## 2019-10-28 NOTE — PROGRESS NOTES
Occupational Therapy Screening: 
Services are not indicated at this time. [facility] based on results obtained during the nursing admission assessment. The patients chart was reviewed and the patient is not appropriate for a skilled therapy evaluation at this time. Please consult occupational therapy if any therapy needs arise. Thank you.  
 
Devendra Goodman OT

## 2019-10-28 NOTE — PROGRESS NOTES
Primary Nurse Sukhdeep Dumont and Andrzej Robins LPN performed a dual skin assessment on this patient No impairment noted Sreekanth score is 9 Pt hospice. No impairments noted. Will continue to monitor. Discussed with pt daughter at bedside about signs of death, she is concerned her aunt will not make it from [place] to visit pt before passing. I stated that there is breathing changes, fluid build up in the extremities, mottling of the skin, etc. Stated we will keep her apprised of the the next steps as they come. Call light in reach and bed in lowest position. Continue to turn pt q 2 hrs.

## 2019-10-28 NOTE — HOSPICE
[facility] Good Help to Those in Need 
[phone] GIP Daily Nursing Note Patient Name: Guanako Callejas 
YOB: 1927 Age: 80 y.o. Date of Visit: 10/28/19 Facility of Care: [facility] Patient Room: [facility]/ Hospice Attending: Montse Pace MD 
Hospice Diagnosis: Acute CVA (cerebrovascular accident) ([facility] [address] [I63.9] Level of Care: GIP Current GIP Symptoms 1. Restlessness. Pt pulling off clothing, reaching out. 2. Pain. Pt moaning, brow furrowed 3. Seizure precautions ASSESSMENT & PLAN 1. Education of patient, family and staff re end of life care. Given Gone From [place]ight to family members bedside. 2. Provide support and frequent rounds for patient comfort and safety 3. Continue with 4. Change IV medications to SL today. Trial to assess patient's ability to tolerate SL medications as planning for disposition to home with his wife. 5. Morphine SL 10mg Q 4 hours scheduled 6. Lorazepam SL 1mg Q 4 hours scheduled 7. Atropine 3 gtts QID scheduled 8. Morphine SL 10mg Q 15 min PRN 9. Lorazepam SL 0.5mg Q 15 min PRN 10. Hold IV medications during trial with SL. Re-evaluate effectiveness of SL medications. 11. Plan for patient discharge is stabilized 12. Monitor for seizure activity. Medicate with IV keppra Q 12 hours. Spiritual Interventions: Hospice Chaplain, [address] visited with patient and family today. Psych/ Social/ Emotional Interventions: Family support provided bedside. Reviewed plan of care and planning for possible discharge is patient stabilizes. Care Coordination Needs: Reviewed changes in plan of care with Unit Staff, RN, Precious Garner. Care plan and New Orders discussed / approved with Vincent Munoz NP Description History and Chart Review If this is initial GIP note must document RN assessment/MD communication in previous setting. Specifically document nursing/medication needs in last 24 hours to support GIP care Narrative History of last 24 hours that demonstrates care cannot be provided in another settin. Frequent bedside rounding and support for patient and family. 2. IV medications for pain 3. IV medications for secretions 4. IV medications for restlessness 5. IV Keppra for seizure prevention 6. Peripheral IV care What has been done to control the patient's symptoms in the last 24 hours? PRN IV medications for symptom management. Perineal and harper care for infection prevention. Does the patient currently require IV medications? Yes Does the patient currently require scheduled medications? Changes made Does the patient currently require a PCA? No 
 
List number of doses of PRN medications in last 24 hours: 
Medication 1: Robinul IV Number of doses: 2 Medication 2: Lorazepam  IV Number of doses: 2 Medication 3:  Morphine IV Number of doses: 2 Supporting documentation for GIP need for pain control: 
[x] Frequent evaluation by a doctor, nurse practitioner, nurse  
[x] Frequent medication adjustment   
[x] IVs that cannot be administered at home  
[x] Aggressive pain management  
[] Complicated technical delivery of medications Supporting documentation for GIP need for symptom control: 
[x]  Sudden decline necessitating intensive nursing intervention 
[]  Uncontrolled / intractable nausea or vomiting  
[]  Pathological fractures 
[]  Advanced open wounds requiring frequent skilled care 
[] Unmanageable respiratory distress [x] New or worsening delirium  
[] Delirium with behavior issues: Is 24 hour caregiver present due to safety concerns with agitation? (yes/no) [x] Imminent death  with skilled nursing needs documented above DISCHARGE PLANNING Daily discharge planning required for GIP 1. Discharge Plan: If patient tolerates Sl medications and is stable to transport, pt to be transported home with hospice care 2. Patient/Family teaching: Family bedside 3. Response to patient/family teaching: Family preparing for discharging patient home ASSESSMENT   
KARNOFSKY: 10 Prognosis estimated based on 10/28/19 clinical assessment is:  
 
[x] Hours to Days Quality Measure: Patient self-reports:  [x] Yes  Partially. At times. Adult Non-Verbal Pain Assessment Score: 6/10 Face 
[] 0   No particular expression or smile 
[] 1   Occasional grimace, tearing, frowning, wrinkled forehead 
[x] 2   Frequent grimace, tearing, frowning, wrinkled forehead Activity (movement) [] 0   Lying quietly, normal position 
[] 1   Seeking attention through movement or slow, cautious movement 
[x] 2   Restless, excessive activity and/or withdrawal reflexes Guarding 
[] 0   Lying quietly, no positioning of hands over areas of body 
[] 1   Splinting areas of the body, tense 
[x] 2   Rigid, stiff Physiology (vital signs) [x] 0   Stable vital signs [] 1   Change in any of the following: SBP > 20mm Hg; HR > 20/minute 
[] 2   Change in any of the following: SBP > 30mm Hg; HR > 25/minute Respiratory [x] 0   Baseline RR/SpO2, compliant with ventilator 
[] 1   RR > 10 above baseline, or 5% drop SpO2, mild asynchrony with ventilator 
[] 2   RR > 20 above baseline, or 10% drop SpO2, asynchrony with ventilator ESAS:  
Time of Assessment: 14:00 Pain (1-10):6 Fatigue (1-10): 6 Shortness of breath (1-10):6 Nausea (1-10): Appetite (1-10): Anxiety: (1-10): Depression: (1-10): Well-being: (1-10): Constipation: _ Yes  _ No 
LAST BM:  
 
CLINICAL INFORMATION No data found. Currently this patient has: 
[] Supplemental O2 [x] IV   
[] PICC [] PORT  
[] NG Tube   
[] PEG Tube  
[] Ostomy    
[x] Franco draining __dark clear_____ urine 
[] Other:  
 
SIGNS/PHYSICAL FINDINGS Skin (including wound): 
[] Warm, dry, supple, intact and color normal for race [x] Warm  
[x] Dry  
[] Cool    
[] Clammy      
[] Diaphoretic Turgor 
 [] Normal 
 [x] Decreased Color: 
 [] Pink [x] Pale 
 [] Cyanotic 
 [] Erythema 
 [] Jaundice [] Normal for Race 
[]  Wounds: 
 
Neuro: 
[x] Lethargy 
[x] Restlessness / agitation 
[] Confusion / delirium [x] Hallucinations possibly, pt reaching out towards unseen things 
[x] Responds to maximal stimulation 
[] Unresponsive [x] Seizures Precautions. No seizure activity at this time. Cardiac: 
[] Dyspnea on Exertion 
[] JVD [] Murmur 
[] Palpitations [] Hypotension 
[x] Hypertension 
[] Tachycardia [] Bradycardia 
[] Irregular HR 
[] Pulses Decreased 
[] Pulses Absent 
[] Edema:       (Location, Grade and Pitting) [] Mottling:      (Location) Respiratory: 
Breath sounds:  
 [] Diminished 
 [] Wheeze [x] Rhonchi 
 [] Rales  
[] Even and unlabored 
[x] Labored:           
[x] Cough [x] Non Productive 
 [] Productive 
  [] Description:          
[] Deep suctioned  
[] O2 at ___ LPM 
[] High flow oxygen greater than 10 LPM 
[] Bi-Pap GI [x] Abdomen flat non tender 
[] Ascites 
[] Nausea 
[] Vomiting 
[] Incontinent of bowels 
[] Bowel sounds (yes/no) [] Diarrhea 
[] Constipation (see above including last bowel movement) [] Checked for impaction 
[x] Last BM Unable to verify. Staff assisting to review Nutrition Diet:_______NPO___ Appetite:  
[] Good  
[] Fair  
[] Poor  
[] Tube Feeding  
[] Voiding 
[] Incontinent [x] Franco Musculoskeletal 
[] Balance/[place] Unsteady [x] Weak Strength:  
 [] Normal  
 [] Limited [x] Decreasing Activities:  
 [] Up as tolerated 
 [x] Bedridden  
 [] Specify: 
 
SAFETY [x] 24 hr. Caregiver [x] Side rails ? [x] Hospital bed  
[x] Reviewed Falls & Safety ALLERGIES AND MEDICATIONS Allergies: No Known Allergies Current Facility-Administered Medications Medication Dose Route Frequency  morphine injection 2 mg  2 mg IntraVENous Q15MIN PRN  
 LORazepam (ATIVAN) injection 0.5 mg  0.5 mg IntraVENous Q15MIN PRN  
  glycopyrrolate (ROBINUL) injection 0.2 mg  0.2 mg IntraVENous Q8H  
 glycopyrrolate (ROBINUL) injection 0.2 mg  0.2 mg IntraVENous Q4H PRN  
 acetaminophen (TYLENOL) suppository 650 mg  650 mg Rectal Q4H PRN  
 bisacodyl (DULCOLAX) suppository 10 mg  10 mg Rectal DAILY PRN  
 scopolamine (TRANSDERM-SCOP) 1 mg over 3 days 1 Patch  1 Patch TransDERmal Q72H  levETIRAcetam (KEPPRA) 1,500 mg in 0.9% sodium chloride 100 mL IVPB  1,500 mg IntraVENous Q12H Visit Time In: 13:00 Visit Time Out: 14:45

## 2019-10-28 NOTE — HOSPICE
[facility] Good Help to Those in Need 
[phone] Inpatient Nursing Admission Patient Name: Nahomi Hernández 
YOB: 1927 Age: 80 y.o. Date of Hospice Admission: 10/27/2019 Hospice Attending Elected by Patient: Gabo Sosa MD 
Primary Care Physician: Verona Todd NP Admitting RN: Efrain Cole : not present Level of Care GIP Facility of Care: [address] [place] [address] Patient Room: [facility] HOSPICE SUMMARY  
ER Visits/ Hospitalizations in past year: 10/24    10/25 Hospice Diagnosis: Acute CVA (cerebrovascular accident) ([facility] [address] [I63.9] Onset Date of Hospice Diagnosis: 10/25 Co-Morbidities:  
Patient Active Problem List  
Diagnosis Code  CAD (coronary artery disease) I25.10  
 HTN (hypertension) I10  
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  
 Cerebrovascular accident (CVA) ([address] I63.9  Acute CVA (cerebrovascular accident) ([facility] [address] I63.9 Diagnoses RELATED to the terminal prognosis: nfarction most confluent in the left frontal lobe 
extending into the left corona radiata with areas of left confluent infarction 
identified in the left parietal lobe. Punctate foci of infarction right cerebellum and left occipital lobe. Other Diagnoses: Albumin 1.4 Rationale for a prognosis of life expectancy of 6 months or less if the disease follows its normal course (Disease Specific History):  
Patient was independent in his daily life and lives with daughter. Hx of seizures and family reported patient with blank stares and not responding on 10/25 and called 911. Had been in ER day before with similar symptoms yet CT scan was negative and was discharged home. 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 Underwent Left MCA distal M2 mechanical thrombectomy yet remained unresponsive. Was requiring Diltiazem drip for tachycardia and Apresoline IV for HTN at time of hospice admission. Today with marked irritating pulmonary congestion he was not able to clear with restlessness yet unresponsive. Hospice recommended by MDs and accepted by family. Kody Zavaleta is a 80 y.o. who was admitted to [facility]. The patient's principle diagnosis of CVA has resulted in patient being unresponsive, restlessness and pulmonary distress with tachypnea and pulmonary congestion. .  Functionally, the patient's Palliative Performance Scale has declined over a period of 3 days  and is estimated at 20 [place] Objective information that support this patients limited prognosis includes: BRAIN MRI 10/26 IMPRESSION: 
 Moderate to large area of infarction most confluent in the left frontal lobe 
extending into the left corona radiata with areas of left confluent infarction 
identified in the left parietal lobe. 
  
Punctate foci of infarction right cerebellum and left occipital lobe. No associated hemorrhage, midline shift or mass effect. No extra-axial 
collection. 
  
No evidence of recurrent M2 vessel occlusion on the left. Probable minimal stenosis/vasospasm at the origin of the posterior division of 
the M2 segment There is no new major vessel occlusion. 
  
  
The patient/family chose comfort measures with the support of Hospice. Patient meets for GIP  LOC as evidenced by There is a high likelihood of sudden decline  
unresponsive, restless, skin very warm and dry, color sallow, lungs with coarse rhonchi audible throughout lung fields, irritating non productive cough, cough effort not enough to clear congestion, hands and feet cool with trunk warm. RR 34 and assessed to be in respiratory distress. Family would like for hospice to be a part of patient's care.  
  
Dr. Elan Hoyos advised of the above  Order received to admit to inpatient hospice with GIP LOC for uncontrolled symptoms listed above. Also There is a high likelihood of sudden decline Dx   Acute CVA. ASSESSMENT Patient self-reports:      [x] No 
 
SYMPTOMS: Staff and family reported that it seemed that his chest congestion got worse \"every hour\" SIGNS/PHYSICAL FINDINGS: Unresponsive, restless with picking at sheets and frequently turning from side to side, color sallow, lungs with congestion throughout with weak non productive cough, abdomen rounded and soft with bowel sounds, hands and feet cool with trunk warm, pedal pulses weak, peripheral venous access site unrermakable, harper for minimal amounts of clear [place] urine. KARNOFSKY:  10 
 
 
CLINICAL INFORMATION Wt Readings from Last 3 Encounters:  
10/27/19 51 kg (112 lb 7 oz) 10/24/19 56 kg (123 lb 7.3 oz) 10/08/19 56.2 kg (124 lb) Visit Vitals /69 (BP 1 Location: Left arm, BP Patient Position: At rest) Pulse 88 Temp 98.4 °F (36.9 °C) Resp 25 SpO2 91% LAB VALUES Lab Results Component Value Date/Time Protein, total 6.8 10/25/2019 01:01 AM  
 Albumin 3.4 (L) 10/25/2019 01:01 AM  
 
 
Currently this patient has: 
[x] Supplemental O2 [x] Peripheral IV [x] Harper Catheter PLAN 1. Had not received prn meds and family reluctant to have meds scheduled   Morphine 2 mg IV q 15 minutes prn pain or dyspnea 2. Restless   Lorazepam 0.5 mg IV q 15 minutes prn 3. Secretions   Scheduled Glycopyrrolate and Scopolamine patch 4. Hx of seizures and took Keppra at home   Keppra  1500 mg IV q 12 hours Hospice Team Frequency Orders: 
Skilled Nurse -   Daily x 7 days /every other day x 7 days  with 5 PRN visits for symptom control. MSW  1 visit for initial assessment/evaluation for family support and need for volunteer services. Flash Oquendo   1 visit for initial assessment/evaluation for spiritual support. ADVANCE CARE PLANNING (Complete in ACP Flow Sheet) Code Status: DNR Durable DNR:   [x]  No 
Code Status Discussed/Confirmed:    YES Preference for Other Life Sustaining Treatment Discussed/Confirmed:  YES Hospitalization Preference:   family prefers for patient to remain inpatient Advance Care Planning 2018 Patient's Healthcare Decision Maker is: Legal Next of Kin Primary Decision Maker Name THE [facility] Primary Decision Maker Phone Number [phone] Primary Decision Maker Relationship to Patient Adult child Confirm Advance Directive Yes, not on file Yazidi: Jewish  Home:  Komal[facility] DISCHARGE PLANNING 1. Discharge Plan: It is expected that patient will pass while inpatient yet family able to take patient home as necessary 2. Patient/Family teaching:  Education provided on symptoms at end of life 3. Response to patient/family teaching: understanding and appreciation expressed SOCIAL/EMOTIONAL/SPIRITUAL NEEDS Spiritual Issues Identified: Strong nataliya that God is in charge Psych/ Social/ Emotional Issues Identified: Lives with daughter and son in law  Shivani Barrera and Day Bright CARE COORDINATION Dr. Levi Pretty  contacted, discharge to hospice order received Dr. Vicki Sher  contacted,  for hospice and provided verbal certification of terminal illness with life expectancy of 6 months or less. Orders for hospice admission, medications and plan of treatment received. Medication reconciliation completed. Hospice attending MD is Dr. Kelly Flynn MEDS: See medication list below DME: Per hospital 
Supplies: Per hospital 
IDT communication to include MD, SN, SW, CH and support team 
 
ALLERGIES AND MEDICATIONS Allergies: No Known Allergies Current Facility-Administered Medications Medication Dose Route Frequency  morphine injection 2 mg  2 mg IntraVENous Q15MIN PRN  
  LORazepam (ATIVAN) injection 0.5 mg  0.5 mg IntraVENous Q15MIN PRN  
 glycopyrrolate (ROBINUL) injection 0.2 mg  0.2 mg IntraVENous Q8H  
 glycopyrrolate (ROBINUL) injection 0.2 mg  0.2 mg IntraVENous Q4H PRN  
 acetaminophen (TYLENOL) suppository 650 mg  650 mg Rectal Q4H PRN  
 bisacodyl (DULCOLAX) suppository 10 mg  10 mg Rectal DAILY PRN  
 scopolamine (TRANSDERM-SCOP) 1 mg over 3 days 1 Patch  1 Patch TransDERmal Q72H

## 2019-10-28 NOTE — PROGRESS NOTES
Physical Therapy Screening: 
Services are not indicated at this time. [facility] therapy based on results obtained during the nursing admission assessment. The patients chart was reviewed and the patient is not appropriate for a skilled therapy evaluation at this time. Please consult physical therapy if any therapy needs arise. Thank you.  
 
Wanda White, PT

## 2019-10-28 NOTE — HOSPICE
Initial spiritual care visit with the patient and family. The patient would open his eyes occasionally but did not make any meaningful connection to the . The patient's granddaughter said he had been like this all day. The patient was a little agitated, picking at his clothes and face . The nurse liaison came in while the  was visiting to address this issue. The  completed the spiritual assessment with the patient's family. The patient is a member of a [employer]. He was very active in the [employer] and was at [facility] 2 days before his stroke. The family discussed the shock that this has been to them as the patient was active up until his stroke. They are working to accept that he is dying. The granddaughter shared they opted for hospice because they know he would not want to live like this. The  and granddaughter discussed the difficulty of decision making. The patient's son-in-law shared the patient has read the Bible 5-6 times. He has a strong nataliya as does the entire family. They are relying on this strength to help them thorough. Various family shared scripture that is important to them. The  and family discussed grief and the many ways in which it manifests. They are doing ok now and appreciate knowing spiritual care is available. They would like the  to return to discuss nataliya and end-of-life.

## 2019-10-29 NOTE — H&P
[facility] Good Help to Those in Need 
[phone] Patient Name: Stephanie Mcpherson 
YOB: 1927 Date of Provider Hospice Visit: 10-28-19 Level of Care:   [] General Inpatient (GIP)    [] Routine   [] Respite Current Location of Care: 
[x] [address] [place] [address] [] [facility] [] [facility] [] [facility] [] Hospice [facility], patient referred from: 
[] [address] [place] [address] [] [facility] [] [facility] [] [facility] [] Home [] Other:  
 
Date of [address] [facility] Earnest Garcia Ne WNLLGWBUM:[id number]  
Hospice Medical Director at time of admission: Felipe Jones MD 
 
Principle Hospice Diagnosis:  Acute CVA (cerebrovascular accident) [facility]) [I63.9] Parker Rosita Mcphersno is a 80y.o. year old who was admitted to [facility]. The patient's principle diagnosis of CVA has resulted in weakness, debility, fatigue, shortness of breath, pain, anxiety, agitation,, poorly responsive not able to swallow or take in nutrition, he does not respond to touch or commands, tachypnea and pulmonary congestion. The patient is dependent on the following ADLs: pt is completely dependent for all ADLs.   
Functionally, the patient's Palliative Performance Scale has declined over a period of several days  and is estimated at 20% and deteriorated to 10%.   
  
 . Objective information that support this patients limited prognosis includes: BRAIN MRI 10/26 IMPRESSION: 
 Moderate to large area of infarction most confluent in the left frontal lobe 
extending into the left corona radiata with areas of left confluent infarction 
identified in the left parietal lobe. 
  
Punctate foci of infarction right cerebellum and left occipital lobe. No associated hemorrhage, midline shift or mass effect. No extra-axial 
collection. 
  
No evidence of recurrent M2 vessel occlusion on the left. Probable minimal stenosis/vasospasm at the origin of the posterior division of 
the M2 segment There is no new major vessel occlusion. 
  
  
 The patient/family chose comfort measures with the support of Hospice. 
  
The patient/family chose comfort measures with the support of Hospice. HOSPICE DIAGNOSES Active Symptoms: 1. Agitation 2. Shortness of breath 3. Altered mental status 4. Dysphagia 5. Pt with non verbal pain indicators with grimacing and movements PLAN 1. Admit to GIP/Routine level of care for overwhelming symptoms, transitions of care, high risk for decline, this care cannot be provided in the home, the need for IV medications, frequent assessments are required by the medical team. 
2. Medications include the following: ' 
3. For secretions robinul and atropine drops and transderm scop patch 4. Keppra IV  for seizure hx and  s/p CVA 5. Lorazepam 1 mg q 4 hrs scheduled oral dosing and additionally if neede ativan 0.5mg IV 6. Roxanol  10 mg q 4 hrs scheduled plus morphine IV 2mg every 15 min as needed 7. Continue comfort care 8.  and SW to support family needs 9. Disposition: to routine level of care once symptoms resolve Prognosis estimated based on 10/29/19 clinical assessment is:  
[x] Hours to Days   
[] Days to Weeks   
[] Other: 
 
Communicated plan of care with: Hospice Case Manager; Hospice IDT; Care Team 
Patient meets for GIP  LOC as evidenced by There is a high likelihood of sudden decline  
unresponsive, restless, skin very warm and dry, color sallow, lungs with coarse rhonchi audible throughout lung fields, irritating non productive cough, cough effort not enough to clear congestion, hands and feet cool with trunk warm.  RR 34 and assessed to be in respiratory distress. Family would like for hospice to be a part of patient's care. GOALS OF CARE Patient/Medical POA stated Goal of Care: comfort care 
 
[x] I have reviewed and/or updated ACP information in the Advance Care Planning Navigator. This information is available in the [address] link in the patient's chart header. Primary Decision Maker ([address]): daughter at bedside is the primary decision maker, there is no ADLW document Resuscitation Status: DNR If DNR is there a Durable DNR on file? : [] Yes pt has inpatient code order DNR and comfort measures [] No (If no, complete Durable DNR) HISTORY History obtained from: chart, SN, CM, family, patient CHIEF COMPLAINT:   
The patient is:  
[] Verbal 
[] Nonverbal 
[x] Unresponsive HPI/SUBJECTIVE:   80year old male with a hx of seizure DO who suffered a CVA and became poorly responsive, history of dementia REVIEW OF SYSTEMS The following systems were: [] reviewed  [x] unable to be reviewed Positive ROS include: 
Constitutional: fatigue, weakness, in pain, short of breath Ears/nose/mouth/throat: increased airway secretions Respiratory:shortness of breath, wheezing Gastrointestinal:poor appetite, nausea, vomiting, abdominal pain, constipation, diarrhea Musculoskeletal:pain, deformities, swelling legs Neurologic:confusion, hallucinations, weakness Psychiatric:anxiety, feeling depressed, poor sleep Endocrine:  
 
Adult Non-Verbal Pain Assessment Score:  5/10 Face 
[] 0   No particular expression or smile 
[] 1   Occasional grimace, tearing, frowning, wrinkled forehead 
[x] 2   Frequent grimace, tearing, frowning, wrinkled forehead Activity (movement) [] 0   Lying quietly, normal position 
[x] 1   Seeking attention through movement or slow, cautious movement 
[] 2   Restless, excessive activity and/or withdrawal reflexes Guarding 
[] 0   Lying quietly, no positioning of hands over areas of body 
[] 1   Splinting areas of the body, tense 
[x] 2   Rigid, stiff Physiology (vital signs) [x] 0   Stable vital signs [] 1   Change in any of the following: SBP > 20mm Hg; HR > 20/minute 
[] 2   Change in any of the following: SBP > 30mm Hg; HR > 25/minute Respiratory [x] 0   Baseline RR/SpO2, compliant with ventilator [] 1   RR > 10 above baseline, or 5% drop SpO2, mild asynchrony with ventilator 
[] 2   RR > 20 above baseline, or 10% drop SpO2, asynchrony with ventilator FUNCTIONAL ASSESSMENT Palliative Performance Scale (PPS): 10-20% PSYCHOSOCIAL/SPIRITUAL ASSESSMENT Principal Problem: 
  Acute CVA (cerebrovascular accident) ([facility] [address] (10/27/2019) Past Medical History:  
Diagnosis Date  CAD (coronary artery disease)  Hypercholesteremia  Hypertension  Seizures ([facility] [address]   
 last seizure in 2011 per pt Past Surgical History:  
Procedure Laterality Date  CARDIAC SURG PROCEDURE UNLIST    
 cardiac stents [address]  HX CHOLECYSTECTOMY  2007  IR PERC ART MECH THROMB INFUSION INTRACRANIAL  10/25/2019  MS EGD BALLOON DILATION ESOPHAGUS <30 MM DIAM  11/21/2012 Social History Tobacco Use  Smoking status: Former Smoker Types: Cigarettes  Smokeless tobacco: Never Used  Tobacco comment: years ago- not sure when Substance Use Topics  Alcohol use: No  
 
Family History Problem Relation Age of Onset  Alcohol abuse Mother No Known Allergies Current Facility-Administered Medications Medication Dose Route Frequency  LORazepam (INTENSOL) 2 mg/mL oral concentrate 1 mg  1 mg SubLINGual Q4H  
 morphine 10 mg/5 mL oral solution 10 mg  10 mg Oral Q15MIN PRN  
 LORazepam (INTENSOL) 2 mg/mL oral concentrate 0.5 mg  0.5 mg SubLINGual Q15MIN PRN  
 atropine 1 % ophthalmic solution 3 Drop  3 Drop SubLINGual QID  morphine (ROXANOL) 100 mg/5 mL (20 mg/mL) concentrated solution 10 mg  10 mg SubLINGual Q4H  
 glycopyrrolate (ROBINUL) injection 0.2 mg  0.2 mg IntraVENous Q6H  
 [Held by provider] morphine injection 2 mg  2 mg IntraVENous Q15MIN PRN  
 [Held by provider] LORazepam (ATIVAN) injection 0.5 mg  0.5 mg IntraVENous Q15MIN PRN  
 [Held by provider] glycopyrrolate (ROBINUL) injection 0.2 mg  0.2 mg IntraVENous Q4H PRN  
  acetaminophen (TYLENOL) suppository 650 mg  650 mg Rectal Q4H PRN  
 bisacodyl (DULCOLAX) suppository 10 mg  10 mg Rectal DAILY PRN  
 scopolamine (TRANSDERM-SCOP) 1 mg over 3 days 1 Patch  1 Patch TransDERmal Q72H  levETIRAcetam (KEPPRA) 1,500 mg in 0.9% sodium chloride 100 mL IVPB  1,500 mg IntraVENous Q12H  
 
 
 PHYSICAL EXAM  
 
Wt Readings from Last 3 Encounters:  
10/27/19 51 kg (112 lb 7 oz) 10/24/19 56 kg (123 lb 7.3 oz) 10/08/19 56.2 kg (124 lb) Visit Vitals /69 (BP 1 Location: Left arm, BP Patient Position: At rest) Pulse 88 Temp 98.4 °F (36.9 °C) Resp 25 SpO2 91% Supplemental O2  [] Yes  [x] NO Last bowel movement: PTA Currently this patient has: 
[x] Peripheral IV [] PICC  [] PORT [] ICD [x] Franco Catheter [] NG Tube   [] PEG Tube   
[] Rectal Tube [] Drain 
[] Other:  
 
Constitutional: unresponsive ,cannot and does not engage in conversation, agitated Eyes: pupils equal, anicteric ENMT: no nasal discharge, moist mucous membranes Cardiovascular: regular rhythm, distal pulses intact Respiratory: breathing not labored, symmetric Gastrointestinal: soft non-tender, +bowel sounds Musculoskeletal: no deformity, no tenderness to palpation Skin: warm, dry Neurologic:pt is/ not able to follow commands, pt is/ not moving all extremities Psychiatric: NA 
 
 
 
Pertinent Lab and or Imaging Tests: 
Lab Results Component Value Date/Time Sodium 140 10/26/2019 07:58 AM  
 Potassium 3.6 10/26/2019 07:58 AM  
 Chloride 109 (H) 10/26/2019 07:58 AM  
 CO2 20 (L) 10/26/2019 07:58 AM  
 Anion gap 11 10/26/2019 07:58 AM  
 Glucose 96 10/26/2019 07:58 AM  
 BUN 24 (H) 10/26/2019 07:58 AM  
 Creatinine 0.82 10/26/2019 07:58 AM  
 BUN/Creatinine ratio 29 (H) 10/26/2019 07:58 AM  
 GFR est AA >60 10/26/2019 07:58 AM  
 GFR est non-AA >60 10/26/2019 07:58 AM  
 Calcium 8.7 10/26/2019 07:58 AM  
 
Lab Results Component Value Date/Time Protein, total 6.8 10/25/2019 01:01 AM  
 Albumin 3.4 (L) 10/25/2019 01:01 AM  
 
   
 
  
Tonya Sanchez NP Met with family and Hospice Liaison 
discussed current plan of care and the possibility of transitioning to home. family is in favor of home if it is possible. Lorazepam and morphine scheduled oral dosing to transition home.

## 2019-10-29 NOTE — PROGRESS NOTES
Problem: Pressure Injury - Risk of 
Goal: *Prevention of pressure injury Description Document Sreekanth Scale and appropriate interventions in the flowsheet. Outcome: Progressing Towards Goal 
Note:  
Pressure Injury Interventions: 
Sensory Interventions: Avoid rigorous massage over bony prominences, Check visual cues for pain, Float heels, Keep linens dry and wrinkle-free, Minimize linen layers, Turn and reposition approx. every two hours (pillows and wedges if needed) Moisture Interventions: Check for incontinence Q2 hours and as needed, Internal/External urinary devices, Limit adult briefs, Minimize layers Activity Interventions: Pressure redistribution bed/mattress(bed type) Mobility Interventions: Float heels, Pressure redistribution bed/mattress (bed type) Nutrition Interventions: Document food/fluid/supplement intake, Offer support with meals,snacks and hydration Friction and Shear Interventions: Lift sheet, Minimize layers Problem: Patient Education: Go to Patient Education Activity Goal: Patient/Family Education Outcome: Progressing Towards Goal 
  
Problem: Falls - Risk of 
Goal: *Absence of Falls Description Document Dee Mackay Fall Risk and appropriate interventions in the flowsheet. Outcome: Progressing Towards Goal 
Note:  
Fall Risk Interventions: 
  
 
Mentation Interventions: Adequate sleep, hydration, pain control, Bed/chair exit alarm, Door open when patient unattended, Family/sitter at bedside, More frequent rounding, Room close to nurse's station Medication Interventions: Teach patient to arise slowly Elimination Interventions: Call light in reach History of Falls Interventions: Door open when patient unattended, Room close to nurse's station Problem: Patient Education: Go to Patient Education Activity Goal: Patient/Family Education Outcome: Progressing Towards Goal

## 2019-10-29 NOTE — HOSPICE
[facility] Good Help to Those in Need 
[phone] GIP Daily Nursing Note Patient Name: Nicolas Gonzalez 
YOB: 1927 Age: 80 y.o. Date of Visit: 10/29/19 Facility of Care: [facility] Patient Room: [facility]/ Hospice Attending: Musa Iyer MD 
Hospice Diagnosis: Acute CVA (cerebrovascular accident) ([address] [I63.9] Level of Care: GIP Current GIP Symptoms 1. Restlessness. Pt pulling off clothing, reaching out. 2. Pain. Pt moaning, brow furrowed 3. Seizure precautions ASSESSMENT & PLAN 1. Education of patient, family and staff re end of life care 2. Provide support and frequent rounds for patient comfort and safety 3. Continue with SL medication of lorazepam 
4. Continue with SL Morphine 5. Continue with atropine SL 
6. Continue with IV Robinul 7. Monitor for seizure activity. Medicate with IV keppra Q 12 hours 8. Supplemental oxygen added for comfort Spiritual Interventions: [facility] [address] visited with patient and family 10/28/2019 Psych/ Social/ Emotional Interventions: Family support provided bedside. Reviewed plan of care and planning for possible discharge is patient stabilizes Care Coordination Needs: Reviewed changes in plan of care with Unit Staff, RN, Xavier Toscano. Care plan and New Orders discussed / approved with Thom Agustin NP. Description History and Chart Review If this is initial GIP note must document RN assessment/MD communication in previous setting. Specifically document nursing/medication needs in last 24 hours to support GIP care Narrative History of last 24 hours that demonstrates care cannot be provided in another settin. Frequent bedside rounding and support for patient and family. 2. IV medications for pain 3. IV medications for secretions 4. IV medications for restlessness 5. IV Keppra for seizure prevention 6. Peripheral IV care What has been done to control the patient's symptoms in the last 24 hours? PRN IV Robinul for secretions. SL morphine, atropine and lorazepam for symptom management. Perineal and harper care for infection prevention. IV Keppra for management of seizures. Does the patient currently require IV medications? Yes Does the patient currently require scheduled medications? Yes Does the patient currently require a PCA? No  
 
List number of doses of PRN medications in last 24 hours: 
Medication 1: Robinul Number of doses: 
 
Medication 2:  
Number of doses: 
 
Medication 3:  
Number of doses: Supporting documentation for GIP need for pain control: 
[x] Frequent evaluation by a doctor, nurse practitioner, nurse  
[x] Frequent medication adjustment   
[x] IVs that cannot be administered at home  
[x] Aggressive pain management  
[] Complicated technical delivery of medications Supporting documentation for GIP need for symptom control: 
[x]  Sudden decline necessitating intensive nursing intervention 
[]  Uncontrolled / intractable nausea or vomiting  
[]  Pathological fractures 
[]  Advanced open wounds requiring frequent skilled care 
[] Unmanageable respiratory distress 
[] New or worsening delirium  
[] Delirium with behavior issues: Is 24 hour caregiver present due to safety concerns with agitation? (yes/no) [x] Imminent death  with skilled nursing needs documented above DISCHARGE PLANNING 1. Discharge Plan: If patient tolerates Sl medications and is stable to transport, pt to be transported home with hospice care. Pt appears to be transitioning today. He is not responsive. 2. Patient/Family teaching: Family bedside 3. Response to patient/family teaching: Family state agreement with current plan of care. ASSESSMENT   
KARNOFSKY: 10 Prognosis estimated based on 10/29/19 clinical assessment is:  
[x] Few to Many Hours Quality Measure: Patient self-reports:  [] Yes    [x] No 
 
 Adult Non-Verbal Pain Assessment Score: 8/10 Face 
[] 0   No particular expression or smile 
[] 1   Occasional grimace, tearing, frowning, wrinkled forehead 
[x] 2   Frequent grimace, tearing, frowning, wrinkled forehead Activity (movement) [] 0   Lying quietly, normal position 
[] 1   Seeking attention through movement or slow, cautious movement 
[x] 2   Restless, excessive activity and/or withdrawal reflexes Guarding 
[] 0   Lying quietly, no positioning of hands over areas of body 
[] 1   Splinting areas of the body, tense 
[x] 2   Rigid, stiff Physiology (vital signs) 
[] 0   Stable vital signs [x] 1   Change in any of the following: SBP > 20mm Hg; HR > 20/minute 
[] 2   Change in any of the following: SBP > 30mm Hg; HR > 25/minute Respiratory 
[] 0   Baseline RR/SpO2, compliant with ventilator 
[x] 1   RR > 10 above baseline, or 5% drop SpO2, mild asynchrony with ventilator 
[] 2   RR > 20 above baseline, or 10% drop SpO2, asynchrony with ventilator ESAS:  
Time of Assessment: 10:00am 
Pain (1-10):8 Fatigue (1-10): 8 Shortness of breath (1-10): 
Nausea (1-10): Appetite (1-10): Anxiety: (1-10): Depression: (1-10): Well-being: (1-10): Constipation: _ Yes  _ No 
LAST BM: no bowel sounds audible CLINICAL INFORMATION Patient Vitals for the past 12 hrs: 
 Temp Pulse Resp BP SpO2  
10/29/19 0846 97.7 °F (36.5 °C) 89 18 131/70 93 % Currently this patient has: 
[x] Supplemental O2 [x] IV   
[] PICC [] PORT  
[] NG Tube   
[] PEG Tube  
[] Ostomy    
[x] Franco draining _______ urine 
[] Other:  
 
SIGNS/PHYSICAL FINDINGS Skin (including wound): 
[] Warm, dry, supple, intact and color normal for race [x] Warm  
[x] Dry  
[] Cool    
[] Clammy      
[] Diaphoretic Turgor 
 [] Normal 
 [x] Decreased Color: 
 [] Pink [x] Pale 
 [] Cyanotic 
 [] Erythema 
 [] Jaundice [] Normal for Race 
[]  Wounds: 
 
Neuro: 
[x] Lethargy 
[] Restlessness / agitation [] Confusion / delirium 
[] Hallucinations 
[] Responds to maximal stimulation 
[x] Unresponsive [x] Seizures Currently controlled with IV Keppra Cardiac: 
[] Dyspnea on Exertion 
[] JVD [] Murmur 
[] Palpitations [] Hypotension 
[] Hypertension 
[] Tachycardia [] Bradycardia 
[] Irregular HR [x] Pulses Decreased wrist bilaterally 
[] Pulses Absent 
[] Edema:       (Location, Grade and Pitting) [] Mottling:      (Location) Respiratory: 
Breath sounds:  
 [] Diminished 
 [] Wheeze [x] Rhonchi 
 [] Rales  
[] Even and unlabored 
[x] Labored:           
[] Cough 
 [] Non Productive 
 [] Productive 
  [] Description:          
[] Deep suctioned  
[] O2 at ___ LPM 
[] High flow oxygen greater than 10 LPM 
[] Bi-Pap GI [x] Abdomen flat and non-tender 
[] Ascites 
[] Nausea 
[] Vomiting 
[] Incontinent of bowels 
[] Bowel sounds (yes/no) [] Diarrhea 
[] Constipation (see above including last bowel movement) [] Checked for impaction 
[] Last BM undetermined Nutrition Diet:_____NPO_____ Appetite:  
[] Good  
[] Fair  
[] Poor  
[] Tube Feeding  
[] Voiding 
[] Incontinent [x] Franco Musculoskeletal 
[] Balance/[place] Unsteady  
[] Weak Strength:  
 [] Normal  
 [] Limited [x] Decreasing Activities:  
 [] Up as tolerated 
 [x] Bedridden  
 [] Specify: 
 
SAFETY [x] 24 hr. Caregiver [x] Side rails ? [x] Hospital bed  
[x] Reviewed Falls & Safety ALLERGIES AND MEDICATIONS Allergies: No Known Allergies Current Facility-Administered Medications Medication Dose Route Frequency  LORazepam (INTENSOL) 2 mg/mL oral concentrate 1 mg  1 mg SubLINGual Q4H  
 morphine 10 mg/5 mL oral solution 10 mg  10 mg Oral Q15MIN PRN  
 LORazepam (INTENSOL) 2 mg/mL oral concentrate 0.5 mg  0.5 mg SubLINGual Q15MIN PRN  
 atropine 1 % ophthalmic solution 3 Drop  3 Drop SubLINGual QID  morphine (ROXANOL) 100 mg/5 mL (20 mg/mL) concentrated solution 10 mg  10 mg SubLINGual Q4H  
  glycopyrrolate (ROBINUL) injection 0.2 mg  0.2 mg IntraVENous Q6H  
 [Held by provider] morphine injection 2 mg  2 mg IntraVENous Q15MIN PRN  
 [Held by provider] LORazepam (ATIVAN) injection 0.5 mg  0.5 mg IntraVENous Q15MIN PRN  
 [Held by provider] glycopyrrolate (ROBINUL) injection 0.2 mg  0.2 mg IntraVENous Q4H PRN  
 acetaminophen (TYLENOL) suppository 650 mg  650 mg Rectal Q4H PRN  
 bisacodyl (DULCOLAX) suppository 10 mg  10 mg Rectal DAILY PRN  
 scopolamine (TRANSDERM-SCOP) 1 mg over 3 days 1 Patch  1 Patch TransDERmal Q72H  levETIRAcetam (KEPPRA) 1,500 mg in 0.9% sodium chloride 100 mL IVPB  1,500 mg IntraVENous Q12H Visit Time In: 10:00 Visit Time Out: 12:00

## 2019-10-29 NOTE — PROGRESS NOTES
Follow-up visit to continue support to the Jovanny's. Mr Abran Dueñas appeared to be resting comfortably. He did not participate in this visit. His daughter and granddaughter were at bedside. Someone is there around the clock, one of them or his great granddaughter. They are reading to him and singing his favorite songs. Affirmed their nataliya and loving care of him.

## 2019-10-30 ENCOUNTER — HOME CARE VISIT (OUTPATIENT)
Dept: HOSPICE | Facility: HOSPICE | Age: 84
End: 2019-10-30
Payer: MEDICARE

## 2019-10-30 NOTE — PROGRESS NOTES
JUAN LUIS Antonio paged and notified of passing. Pt denies any needs at present time. Madga Mayes paged and on his way. [phone]: [employer] paged and voicemail left.

## 2019-10-30 NOTE — PROGRESS NOTES
Notified by nurse of Pt's passing. Visited with daughter and granddaughter.  listened as family shared about the [employer] of pt.  prayed a prayer of peace for the family and blessing over pt. More family is coming later.  advised of family of  availability.  is available as needed. Chaplain Virgil Intern, MDiv 
[phone]

## 2019-10-30 NOTE — PROGRESS NOTES
Called to examine patient who has . No response to verbal and tactile stimuli. No respiratory effort. Absent heart sounds and pulses. Pupils fixed and dilated. Patient pronounced dead at 11:12 PM hours. Patient's family was available at the bedside at the time of passing. Support provided.

## 2019-10-31 ENCOUNTER — HOME CARE VISIT (OUTPATIENT)
Dept: HOSPICE | Facility: HOSPICE | Age: 84
End: 2019-10-31
Payer: MEDICARE

## 2019-11-03 NOTE — DISCHARGE SUMMARY
Discharge Summary [facility] Good Help to Those in Need 
[phone] Date of Admission: 10/27/2019 Date of Discharge: 10/29/2019 Sailaja Velazquez is a 80y.o. year old who was admitted to [employer] [facility] with a Hospice diagnosis of Acute CVA (cerebrovascular accident) ([facility] [address] [I63.9]. Pt was admitted for GIP level care. Per HPI Active Symptoms: 1. Agitation 2. Shortness of breath 3. Altered mental status 4. Dysphagia 5. Pt with non verbal pain indicators with grimacing and movements 
  
 PLAN 1. Admit to GIP/Routine level of care for overwhelming symptoms, transitions of care, high risk for decline, this care cannot be provided in the home, the need for IV medications, frequent assessments are required by the medical team. 
2. Medications include the following: ' 
3. For secretions robinul and atropine drops and transderm scop patch 4. Keppra IV  for seizure hx and  s/p CVA 5. Lorazepam 1 mg q 4 hrs scheduled oral dosing and additionally if neede ativan 0.5mg IV 6. Roxanol  10 mg q 4 hrs scheduled plus morphine IV 2mg every 15 min as needed 7. Continue comfort care 
  
8.  and SW to support family needs 9. Disposition: to routine level of care once symptoms resolve The patient's care was focused on comfort and the patient passed away on 10/29/2019.

## 2019-11-09 NOTE — PROGRESS NOTES
Dr. Andrew Mae notified of rhythm change from NSR to a-fib and multiple PVCs. No new orders at this time. Pt asymptomatic. No-Patient/Caregiver offered and refused free interpretation services.

## 2020-03-23 NOTE — PROGRESS NOTES
----- Message from Maribell Wolf sent at 3/23/2020  8:46 AM CDT -----  Contact: self/ [phone]  Patient work at [employer] and she has chronic sinus and 2 surgeries 6 months ago and patient would like to know if she can have a letter excusing her from work because she does not feel safe around the kids that are still left on campus , please call and advise.    Only seizure he had was from starting the aricept and he was instructed not to take it anymore back in January   Memory loss- per the patient he can still remember the long term things like when he was a kid, per his daughter he has his good days and bad days, he has had some decline but not much